# Patient Record
Sex: FEMALE | Race: WHITE | NOT HISPANIC OR LATINO | Employment: FULL TIME | URBAN - METROPOLITAN AREA
[De-identification: names, ages, dates, MRNs, and addresses within clinical notes are randomized per-mention and may not be internally consistent; named-entity substitution may affect disease eponyms.]

---

## 2017-01-03 ENCOUNTER — OFFICE VISIT (OUTPATIENT)
Dept: FAMILY MEDICINE | Facility: CLINIC | Age: 32
End: 2017-01-03
Payer: COMMERCIAL

## 2017-01-03 VITALS
DIASTOLIC BLOOD PRESSURE: 64 MMHG | HEART RATE: 91 BPM | OXYGEN SATURATION: 100 % | HEIGHT: 63 IN | BODY MASS INDEX: 23.92 KG/M2 | WEIGHT: 135 LBS | SYSTOLIC BLOOD PRESSURE: 102 MMHG | TEMPERATURE: 98.6 F

## 2017-01-03 DIAGNOSIS — R42 DIZZINESS: ICD-10-CM

## 2017-01-03 DIAGNOSIS — Z01.818 PREOP GENERAL PHYSICAL EXAM: Primary | ICD-10-CM

## 2017-01-03 DIAGNOSIS — H93.92: ICD-10-CM

## 2017-01-03 LAB
ALBUMIN SERPL-MCNC: 4 G/DL (ref 3.4–5)
ALP SERPL-CCNC: 116 U/L (ref 40–150)
ALT SERPL W P-5'-P-CCNC: 51 U/L (ref 0–50)
ANION GAP SERPL CALCULATED.3IONS-SCNC: 9 MMOL/L (ref 3–14)
AST SERPL W P-5'-P-CCNC: 27 U/L (ref 0–45)
BASOPHILS # BLD AUTO: 0 10E9/L (ref 0–0.2)
BASOPHILS NFR BLD AUTO: 0.4 %
BETA HCG QUAL IFA URINE: NEGATIVE
BILIRUB SERPL-MCNC: 0.2 MG/DL (ref 0.2–1.3)
BUN SERPL-MCNC: 15 MG/DL (ref 7–30)
CALCIUM SERPL-MCNC: 9.1 MG/DL (ref 8.5–10.1)
CHLORIDE SERPL-SCNC: 104 MMOL/L (ref 94–109)
CO2 SERPL-SCNC: 28 MMOL/L (ref 20–32)
CREAT SERPL-MCNC: 0.72 MG/DL (ref 0.52–1.04)
DIFFERENTIAL METHOD BLD: NORMAL
EOSINOPHIL # BLD AUTO: 0.2 10E9/L (ref 0–0.7)
EOSINOPHIL NFR BLD AUTO: 3.3 %
ERYTHROCYTE [DISTWIDTH] IN BLOOD BY AUTOMATED COUNT: 11.9 % (ref 10–15)
GFR SERPL CREATININE-BSD FRML MDRD: ABNORMAL ML/MIN/1.7M2
GLUCOSE SERPL-MCNC: 104 MG/DL (ref 70–99)
HCT VFR BLD AUTO: 41.2 % (ref 35–47)
HGB BLD-MCNC: 14 G/DL (ref 11.7–15.7)
LYMPHOCYTES # BLD AUTO: 2 10E9/L (ref 0.8–5.3)
LYMPHOCYTES NFR BLD AUTO: 28.5 %
MCH RBC QN AUTO: 29.6 PG (ref 26.5–33)
MCHC RBC AUTO-ENTMCNC: 34 G/DL (ref 31.5–36.5)
MCV RBC AUTO: 87 FL (ref 78–100)
MONOCYTES # BLD AUTO: 0.6 10E9/L (ref 0–1.3)
MONOCYTES NFR BLD AUTO: 8.1 %
NEUTROPHILS # BLD AUTO: 4.1 10E9/L (ref 1.6–8.3)
NEUTROPHILS NFR BLD AUTO: 59.7 %
PLATELET # BLD AUTO: 186 10E9/L (ref 150–450)
POTASSIUM SERPL-SCNC: 3.7 MMOL/L (ref 3.4–5.3)
PROT SERPL-MCNC: 7.5 G/DL (ref 6.8–8.8)
RBC # BLD AUTO: 4.73 10E12/L (ref 3.8–5.2)
SODIUM SERPL-SCNC: 141 MMOL/L (ref 133–144)
WBC # BLD AUTO: 6.9 10E9/L (ref 4–11)

## 2017-01-03 PROCEDURE — 99214 OFFICE O/P EST MOD 30 MIN: CPT | Performed by: PHYSICIAN ASSISTANT

## 2017-01-03 PROCEDURE — 85025 COMPLETE CBC W/AUTO DIFF WBC: CPT | Performed by: PHYSICIAN ASSISTANT

## 2017-01-03 PROCEDURE — 80053 COMPREHEN METABOLIC PANEL: CPT | Performed by: PHYSICIAN ASSISTANT

## 2017-01-03 PROCEDURE — 36415 COLL VENOUS BLD VENIPUNCTURE: CPT | Performed by: PHYSICIAN ASSISTANT

## 2017-01-03 PROCEDURE — 84703 CHORIONIC GONADOTROPIN ASSAY: CPT | Performed by: PHYSICIAN ASSISTANT

## 2017-01-03 NOTE — PROGRESS NOTES
[address] [address]  [phone]  Dept: [phone]    PRE-OP EVALUATION:  Today's date: 1/3/2017    Jenae Villeda (: 1985) presents for pre-operative evaluation assessment as requested by Dr. rSidhar Tran.  She requires evaluation and anesthesia risk assessment prior to undergoing surgery/procedure for treatment of Fluid/opacity in the mastoid.  Proposed procedure: Left Mastoidectomy Possible Pressure Equalization Tube Insertion -Left     Date of Surgery/ Procedure: 2017  Time of Surgery/ Procedure: 9:05am  Hospital/Surgical Facility: [facility]  Primary Physician: No primary care provider on file.  Type of Anesthesia Anticipated: General    Patient has a Health Care Directive or Living Will:  NO    1. NO - Do you have a history of heart attack, stroke, stent, bypass or surgery on an artery in the head, neck, heart or legs?  2. NO - Do you ever have any pain or discomfort in your chest?  3. NO - Do you have a history of  Heart Failure?  4. NO - Are you troubled by shortness of breath when: walking on the level, up a slight hill or at night?  5. YES - DO YOU CURRENTLY HAVE A COLD, BRONCHITIS OR OTHER RESPIRATORY INFECTION? She had a sinus cold a few weeks ago, but that has cleared.  She reports mild nasal congestion in the last day or two.    6. NO - Do you have a cough, shortness of breath or wheezing?  7. NO - Do you sometimes get pains in the calves of your legs when you walk?  8. NO - Do you or anyone in your family have previous history of blood clots?  9. NO - Do you or does anyone in your family have a serious bleeding problem such as prolonged bleeding following surgeries or cuts?  10. NO - Have you ever had problems with anemia or been told to take iron pills?  11. NO - Have you had any abnormal blood loss such as black, tarry or bloody stools, or abnormal vaginal bleeding?  12. NO - Have you  ever had a blood transfusion?  13. NO - Have you or any of your relatives ever had problems with anesthesia?  14. NO - Do you have sleep apnea, excessive snoring or daytime drowsiness?  15. NO - Do you have any prosthetic heart valves?  16. NO - Do you have prosthetic joints?  17. NO - Is there any chance that you may be pregnant?      HPI:                                                      Brief HPI related to upcoming procedure: Dizziness, nausea, vertigo and abnomral CT scan showing opacity/fluid in mastoid cells.        See problem list for active medical problems.  Problems all longstanding and stable, except as noted/documented.  See ROS for pertinent symptoms related to these conditions.                                                                                                  .    MEDICAL HISTORY:                                                      Patient Active Problem List    Diagnosis Date Noted     Status post  delivery 2016     Priority: Medium     Labor and delivery, indication for care 2016     Priority: Medium     Supervision of normal pregnancy in first trimester 2015     Priority: Medium     OB notes --SURPRISE;  -Fab; EDC c/b 9wk us; post placenta  Genetic screen --declined  Blood type O negative --received rhogam at 28wks (16)  Tdap/Flu --16; had thru work 2015  Prior c/s --FTP/CPD with 8-2 delivery; would like scheduled repeat at 39wks-scheduled for 16 @ 7AM  Hx PTL at 34wks with son; steriods and nifedipine; delivered at 40wks; will check CL at anatomy us  1 HR GCT/HGB: passed-83,11.7       Previous  delivery, antepartum 2015     Priority: Medium     S/P  section 2014     Priority: Medium     Dizziness 2013     Followed by Neurology, see scanned record 2013       Perspiration excessive 10/22/2012     Angular cheilitis 06/15/2011     Treated with protopic - by derm.       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  Think is related; but no immediate treatment needed.         CARDIOVASCULAR SCREENING; LDL GOAL LESS THAN 160 10/31/2010     IBS (irritable bowel syndrome) 2009     Mononucleosis      Totally recovered       Recurrent stomach ache 2008     Has had evaluated in the past.  Gluten AB negative. Will have records sent. No current treatment but not resolved either.        Past Medical History   Diagnosis Date     Mononucleosis      Totally recovered     IBS (irritable bowel syndrome)      Nasal congestion      Sore throat      Headache      GI problem      Heartburn      Diarrhea      Oligomenorrhea      Past Surgical History   Procedure Laterality Date      section  2014     Procedure:  SECTION;  Surgeon: Izabela Saxena MD;  Location:  L+D      section N/A 2016     Procedure:  SECTION;  Surgeon: Whitney Marshall MD;  Location:  L+D     Gyn surgery        in  and      Current Outpatient Prescriptions   Medication Sig Dispense Refill     Dicyclomine HCl (BENTYL PO) Take 10-20 mg by mouth 3 times daily as needed       OTC products: none    No Known Allergies   Latex Allergy: NO    Social History   Substance Use Topics     Smoking status: Never Smoker      Smokeless tobacco: Never Used     Alcohol Use: No     History   Drug Use No       REVIEW OF SYSTEMS:                                                    Constitutional, neuro, ENT, endocrine, pulmonary, cardiac, gastrointestinal, genitourinary, musculoskeletal, integument and psychiatric systems are negative, except as otherwise noted.    EXAM:                                                    /64 mmHg  Pulse 91  Temp(Src) 98.6  F (37  C) (Oral)  Ht 5' 3\" (1.6 m)  Wt 135 lb (61.236 kg)  BMI 23.92 kg/m2  " SpO2 100%  LMP 12/24/2016 (Exact Date)  Breastfeeding? No    GENERAL APPEARANCE: healthy, alert and no distress     EYES: EOMI,- PERRL     HENT: ear canals and TM's normal and nose and mouth without ulcers or lesions     NECK: no adenopathy, no asymmetry, masses, or scars and thyroid normal to palpation     RESP: lungs clear to auscultation - no rales, rhonchi or wheezes     CV: regular rates and rhythm, normal S1 S2, no S3 or S4 and no murmur, click or rub -     ABDOMEN:  soft, nontender, no HSM or masses and bowel sounds normal     MS: extremities normal- no gross deformities noted, no evidence of inflammation in joints, FROM in all extremities.     SKIN: no suspicious lesions or rashes     NEURO: Normal strength and tone, sensory exam grossly normal, mentation intact and speech normal     PSYCH: mentation appears normal. and affect normal/bright     LYMPHATICS: No axillary, cervical, inguinal, or supraclavicular nodes    DIAGNOSTICS:                                                      Labs Drawn and in Process:   Unresulted Labs Ordered in the Past 30 Days of this Admission     No orders found from 11/5/2016 to 1/4/2017.          Recent Labs   Lab Test  07/21/16   0800  07/20/16   0530   12/22/15   1134   06/11/13   1050  04/09/12   1000   HGB  12.1  12.8   < >  13.1   < >  14.1  14.3   PLT   --    --    --   153   --   166  158   NA   --    --    --    --    --   140  142   POTASSIUM   --    --    --    --    --   3.9  3.8   CR   --    --    --    --    --   0.69  0.74    < > = values in this interval not displayed.        IMPRESSION:                                                    Reason for surgery/procedure: surgery/procedure for treatment of Fluid/opacity in the mastoid.  Diagnosis/reason for consult: Pre-op for surgery to correct CT findings of:  opacity/fluid in mastoid cells    The proposed surgical procedure is considered INTERMEDIATE risk.    REVISED CARDIAC RISK INDEX  The patient has the  following serious cardiovascular risks for perioperative complications such as (MI, PE, VFib and 3  AV Block):  No serious cardiac risks  INTERPRETATION: 0 risks: Class I (very low risk - 0.4% complication rate)    The patient has the following additional risks for perioperative complications:  No identified additional risks      ICD-10-CM    1. Preop general physical exam Z01.818 CBC with platelets and differential     Comprehensive metabolic panel (BMP + Alb, Alk Phos, ALT, AST, Total. Bili, TP)     Beta HCG qual IFA urine   2. Disorder of ear, left H93.92    3. Dizziness R42        RECOMMENDATIONS:                                                      --Consult hospital rounder / IM to assist post-op medical management    --Patient is to take all scheduled medications on the day of surgery EXCEPT for modifications listed below.    APPROVAL GIVEN to proceed with proposed procedure, without further diagnostic evaluation    Patient advised to followup if she develops any sign of cold/URI or infection.  She was cautioned to not plan for surgery if she has illness.         Signed Electronically by: Ashwini Rios PA-C    Copy of this evaluation report is provided to requesting physician.I have reviewed today's vital signs, medications, labs and H &P. I agree with Ashwini Rios PA-C's assessment and plan as above.        Jia Ramos MD        [facility] Preop Guidelines

## 2017-01-03 NOTE — NURSING NOTE
"Chief Complaint   Patient presents with     Pre-Op Exam       Initial /64 mmHg  Pulse 91  Temp(Src) 98.6  F (37  C) (Oral)  Ht 5' 3\" (1.6 m)  Wt 135 lb (61.236 kg)  BMI 23.92 kg/m2  SpO2 100%  LMP 12/24/2016 (Exact Date)  Breastfeeding? No Estimated body mass index is 23.92 kg/(m^2) as calculated from the following:    Height as of this encounter: 5' 3\" (1.6 m).    Weight as of this encounter: 135 lb (61.236 kg).  BP completed using cuff size: regular  Csaba Mlnarik CMA    "

## 2017-01-04 NOTE — PROGRESS NOTES
Quick Note:    Jennifer Emanuel ,    The results from your recent lab work are within normal limits.    -Liver and gallbladder tests (ALT,AST, Alk phos,bilirubin) are fine, but one liver number is very mildly elevated. I advise a re-check of this lab in 8-12 weeks.  -Kidney function (GFR) is normal.  -Sodium is normal.  -Potassium is normal.  -Glucose (diabetic screening test) is mildly elevated, but you were not fasting, so this is fine.   -Normal red blood cell (hgb) levels, normal white blood cell count and normal platelet levels.     Thank you for choosing [facility] for your health care needs,      Ashwini Rios PA-C    ______

## 2017-01-16 ENCOUNTER — ANESTHESIA (OUTPATIENT)
Dept: SURGERY | Facility: AMBULATORY SURGERY CENTER | Age: 32
End: 2017-01-16

## 2017-01-16 RX ORDER — EPHEDRINE SULFATE 50 MG/ML
INJECTION, SOLUTION INTRAMUSCULAR; INTRAVENOUS; SUBCUTANEOUS PRN
Status: DISCONTINUED | OUTPATIENT
Start: 2017-01-16 | End: 2017-01-16

## 2017-01-16 RX ORDER — LIDOCAINE HYDROCHLORIDE 20 MG/ML
INJECTION, SOLUTION INFILTRATION; PERINEURAL PRN
Status: DISCONTINUED | OUTPATIENT
Start: 2017-01-16 | End: 2017-01-16

## 2017-01-16 RX ORDER — PROPOFOL 10 MG/ML
INJECTION, EMULSION INTRAVENOUS CONTINUOUS PRN
Status: DISCONTINUED | OUTPATIENT
Start: 2017-01-16 | End: 2017-01-16

## 2017-01-16 RX ORDER — PROPOFOL 10 MG/ML
INJECTION, EMULSION INTRAVENOUS PRN
Status: DISCONTINUED | OUTPATIENT
Start: 2017-01-16 | End: 2017-01-16

## 2017-01-16 RX ORDER — ONDANSETRON 2 MG/ML
INJECTION INTRAMUSCULAR; INTRAVENOUS PRN
Status: DISCONTINUED | OUTPATIENT
Start: 2017-01-16 | End: 2017-01-16

## 2017-01-16 RX ORDER — DEXAMETHASONE SODIUM PHOSPHATE 4 MG/ML
INJECTION, SOLUTION INTRA-ARTICULAR; INTRALESIONAL; INTRAMUSCULAR; INTRAVENOUS; SOFT TISSUE PRN
Status: DISCONTINUED | OUTPATIENT
Start: 2017-01-16 | End: 2017-01-16

## 2017-01-16 RX ADMIN — PROPOFOL 50 MG: 10 INJECTION, EMULSION INTRAVENOUS at 11:20

## 2017-01-16 RX ADMIN — EPHEDRINE SULFATE 10 MG: 50 INJECTION, SOLUTION INTRAMUSCULAR; INTRAVENOUS; SUBCUTANEOUS at 09:48

## 2017-01-16 RX ADMIN — EPHEDRINE SULFATE 10 MG: 50 INJECTION, SOLUTION INTRAMUSCULAR; INTRAVENOUS; SUBCUTANEOUS at 10:27

## 2017-01-16 RX ADMIN — PROPOFOL: 10 INJECTION, EMULSION INTRAVENOUS at 09:59

## 2017-01-16 RX ADMIN — SODIUM CHLORIDE, SODIUM LACTATE, POTASSIUM CHLORIDE, CALCIUM CHLORIDE: 600; 310; 30; 20 INJECTION, SOLUTION INTRAVENOUS at 11:30

## 2017-01-16 RX ADMIN — PROPOFOL 200 MG: 10 INJECTION, EMULSION INTRAVENOUS at 09:33

## 2017-01-16 RX ADMIN — LIDOCAINE HYDROCHLORIDE 60 MG: 20 INJECTION, SOLUTION INFILTRATION; PERINEURAL at 09:32

## 2017-01-16 RX ADMIN — PROPOFOL: 10 INJECTION, EMULSION INTRAVENOUS at 11:01

## 2017-01-16 RX ADMIN — PROPOFOL 200 MCG/KG/MIN: 10 INJECTION, EMULSION INTRAVENOUS at 09:35

## 2017-01-16 RX ADMIN — PROPOFOL 50 MG: 10 INJECTION, EMULSION INTRAVENOUS at 11:15

## 2017-01-16 RX ADMIN — DEXAMETHASONE SODIUM PHOSPHATE 4 MG: 4 INJECTION, SOLUTION INTRA-ARTICULAR; INTRALESIONAL; INTRAMUSCULAR; INTRAVENOUS; SOFT TISSUE at 09:37

## 2017-01-16 RX ADMIN — SODIUM CHLORIDE, SODIUM LACTATE, POTASSIUM CHLORIDE, CALCIUM CHLORIDE: 600; 310; 30; 20 INJECTION, SOLUTION INTRAVENOUS at 09:20

## 2017-01-16 RX ADMIN — ONDANSETRON 4 MG: 2 INJECTION INTRAMUSCULAR; INTRAVENOUS at 11:05

## 2017-01-17 ENCOUNTER — CARE COORDINATION (OUTPATIENT)
Dept: OTOLARYNGOLOGY | Facility: CLINIC | Age: 32
End: 2017-01-17

## 2017-01-17 NOTE — PROGRESS NOTES
POST OPERATIVE CALL: 1-17-17    PROCEDURE: left mastoidectomy 1-16-17/ same day procedure      PAIN: pain managed by percocet/ibuprofen    INCISION:still has turban dressing on- reviewed ear care and call for signs of  Incision infection.    BALANCE: light headed, denies dizziness    Facial function- smile equal    DIET: minimal-     BOWEL/BLADDER STATUS: started meds for bowel management, reviewed importance of this while on pain meds, pt understood    ACTIVITY: up to bathroom-     RTC:2-1-17 9:45 AM.  Will call pt this Friday with update on cultures and order ear drops - to start one week post op.

## 2017-01-23 ENCOUNTER — CARE COORDINATION (OUTPATIENT)
Dept: OTOLARYNGOLOGY | Facility: CLINIC | Age: 32
End: 2017-01-23

## 2017-01-23 NOTE — PROGRESS NOTES
Pt with  left mastoiditis, S/P 1-16-17 left mastoidectomy.  1-20-17 pt called with symptoms: left ear pain, pressure and unable to hear from left ear. Pt taking 1 tab percocet for the pain, pt is unable to hear out of the left ear. Pt says her hearing is distorted, unable to locate were sound is coming from, has a hard time focusing on task with this distorted hearing. Pt is to return to work 1-23-17. Pt should not return to work if taking pain medication.  The ear pressure is due to the gel foam packing in the ear, this will take time for the packing to dissolve.  Dr. Tran update and agreed with plan.  [employer] [phone]

## 2017-01-25 ENCOUNTER — CARE COORDINATION (OUTPATIENT)
Dept: OTOLARYNGOLOGY | Facility: CLINIC | Age: 32
End: 2017-01-25

## 2017-01-25 NOTE — PROGRESS NOTES
1-24-17 pt called she is feeling better, less pressure in the ear area and feels she could return to work  on 1-25-17. Dr. Tran updated and agrees with plan to return to work- no restrictions.

## 2017-02-01 ENCOUNTER — OFFICE VISIT (OUTPATIENT)
Dept: OTOLARYNGOLOGY | Facility: CLINIC | Age: 32
End: 2017-02-01

## 2017-02-01 VITALS — WEIGHT: 138 LBS | BODY MASS INDEX: 24.45 KG/M2 | HEIGHT: 63 IN

## 2017-02-01 DIAGNOSIS — H70.92 MASTOIDITIS, LEFT: Primary | ICD-10-CM

## 2017-02-01 ASSESSMENT — PAIN SCALES - GENERAL: PAINLEVEL: NO PAIN (0)

## 2017-02-01 NOTE — MR AVS SNAPSHOT
After Visit Summary   2/1/2017    Jenae Villeda    MRN: [id number]           Patient Information     Date Of Birth          1985        Visit Information        Provider Department      2/1/2017 9:45 AM Sridhar Tran MD [facility]        Care Instructions    You will need  to schedule a follow up appointment in 6-8-weeks with an audio.   Please call our clinic for any questions,concerns,or worsening symptoms.      Clinic #[phone]       Option 3  for the triage nurse.        Follow-ups after your visit        Your next 10 appointments already scheduled     Mar 15, 2017  9:00 AM   Walk In From ENT with JOLYNN Castellanos   [facility] ([facility])    [address] [place] [id number] [phone]            Mar 15, 2017  9:45 AM   (Arrive by 9:30 AM)   Return Visit with MD LESVIA Swift [facility] ([facility])    [address] [place] [id number] [phone]              Who to contact     Please call your clinic at [phone] to:    Ask questions about your health    Make or cancel appointments    Discuss your medicines    Learn about your test results    Speak to your doctor   If you have compliments or concerns about an experience at your clinic, or if you wish to file a complaint, please contact [facility] Patient Relations at [phone] or email us at [email or web address]         Additional Information About Your Visit        MyChart Information     [employer] gives you secure access to your electronic health record. If you see a primary care provider, you can also send messages to your care team and make appointments. If you have questions, please call your primary care clinic.  If you do not have a primary care provider, please call [phone] and they will assist you.      "[employer]hart  "is an electronic gateway that provides easy, online access to your medical records. With [email or web address], you can request a clinic appointment, read your test results, renew a prescription or communicate with your care team.     To access your existing account, please contact your [facility] or call [phone] for assistance.        Care EveryWhere ID     This is your Care EveryWhere ID. This could be used by other organizations to access your [facility] medical records  [id number]        Your Vitals Were     Height BMI (Body Mass Index) Last Period             1.59 m (5' 2.6\") 24.76 kg/m2 12/24/2016 (Exact Date)          Blood Pressure from Last 3 Encounters:   01/16/17 119/60   01/03/17 102/64   09/02/16 110/68    Weight from Last 3 Encounters:   02/01/17 62.596 kg (138 lb)   01/16/17 61.236 kg (135 lb)   01/03/17 61.236 kg (135 lb)              Today, you had the following     No orders found for display         Today's Medication Changes          These changes are accurate as of: 2/1/17 10:07 AM.  If you have any questions, ask your nurse or doctor.               Stop taking these medicines if you haven't already. Please contact your care team if you have questions.     ondansetron 4 MG ODT tab   Commonly known as:  ZOFRAN-ODT   Stopped by:  Sridhar Tran MD           oxyCODONE-acetaminophen 5-325 MG per tablet   Commonly known as:  PERCOCET   Stopped by:  Sridhar Tran MD           senna-docusate 8.6-50 MG per tablet   Commonly known as:  SENOKOT-S;PERICOLACE   Stopped by:  Sridhar Tran MD                    Primary Care Provider    None Specified       No primary provider on file.        Thank you!     Thank you for choosing [facility]  for your care. Our goal is always to provide you with excellent care. Hearing back from our patients is one way we can continue to improve our services. Please take a few minutes to complete the written survey that you may " receive in the mail after your visit with us. Thank you!             Your Updated Medication List - Protect others around you: Learn how to safely use, store and throw away your medicines at www.disposemymeds.org.          This list is accurate as of: 2/1/17 10:07 AM.  Always use your most recent med list.                   Brand Name Dispense Instructions for use    BENTYL PO      Take 10-20 mg by mouth 3 times daily as needed       IBUPROFEN PO      Take 600 mg by mouth every 8 hours as needed for moderate pain

## 2017-02-01 NOTE — PATIENT INSTRUCTIONS
You will need  to schedule a follow up appointment in 6-8-weeks with an audio.   Please call our clinic for any questions,concerns,or worsening symptoms.      Clinic #[phone]       Option 3  for the triage nurse.

## 2017-02-01 NOTE — NURSING NOTE
Chief Complaint   Patient presents with     RECHECK     post op. mastoidectomy     Cedric Cordon LPN

## 2017-02-01 NOTE — PROGRESS NOTES
HISTORY OF PRESENT ILLNESS:  Ms. Villeda is a 31-year-old woman who is seen in followup after having had a left wall up mastoidectomy.  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  At the time of surgery, she was seen to have granulation tissue and a motor oil consistency fluid filling the mastoid space.  She had antral block at the time of the procedure.  I did a facial recess and a resection of the antral block.  Her middle ear looks reasonably healthy.  The fluid was sent for culture and showed a propionibacterium which I think is just a contaminant from the field.  I did not treat it.  She has had improvement in her symptoms since the procedure.  We talked a little about that today.        REVIEW OF SYSTEMS:  Her review of systems shows a headache and ringing in the ear, but no other real changes.       MEDICATIONS:  Her medications and problem list were reviewed.      PHYSICAL EXAMINATION:  Examination today shows that the incision line is clean, dry and intact.  The external canal has a small clot lying on the drum seen with the operating microscope.  Using a right angle hook and the microscope, I removed it with an alligator and the drum is anatomically intact.  Hughes lateralizes left.  Rinne reverses on the left.  The exam is otherwise unremarkable.  Her facial nerve remains a House-Brackmann I and she shows no evidence of nystagmus.      IMPRESSION:  My overall impression is that she is doing well after her surgery.      PLAN:  I will recheck her audio in six to eight weeks and reexamine her.  The question is whether she needs a PE tube or not and for the moment because of water sports I am going to leaving it intact.      SL/brandy

## 2017-02-01 NOTE — Clinical Note
2/1/2017       RE: Jenae Villeda  [address] [address] [facility] [address]     Dear Colleague,    Thank you for referring your patient, Jenae Villeda, to the [facility] at [facility]. Please see a copy of my visit note below.    HISTORY OF PRESENT ILLNESS:  Ms. Villeda is a 31-year-old woman who is seen in followup after having had a left wall up mastoidectomy.  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  At the time of surgery, she was seen to have granulation tissue and a motor oil consistency fluid filling the mastoid space.  She had antral block at the time of the procedure.  I did a facial recess and a resection of the antral block.  Her middle ear looks reasonably healthy.  The fluid was sent for culture and showed a propionibacterium which I think is just a contaminant from the field.  I did not treat it.  She has had improvement in her symptoms since the procedure.  We talked a little about that today.        REVIEW OF SYSTEMS:  Her review of systems shows a headache and ringing in the ear, but no other real changes.       MEDICATIONS:  Her medications and problem list were reviewed.      PHYSICAL EXAMINATION:  Examination today shows that the incision line is clean, dry and intact.  The external canal has a small clot lying on the drum seen with the operating microscope.  Using a right angle hook and the microscope, I removed it with an alligator and the drum is anatomically intact.  Hughes lateralizes left.  Rinne reverses on the left.  The exam is otherwise unremarkable.  Her facial nerve remains a House-Brackmann I and she shows no evidence of nystagmus.      IMPRESSION:  My overall impression is that she is doing well after her surgery.      PLAN:  I will recheck her audio in six to eight weeks and reexamine her.  The question is whether she needs a PE tube or not and for the moment because of water  sports I am going to leaving it intact.      SL/brandy           Again, thank you for allowing me to participate in the care of your patient.      Sincerely,    Sridhar Tran MD

## 2017-03-07 DIAGNOSIS — H70.90 MASTOIDITIS: Primary | ICD-10-CM

## 2017-03-08 ENCOUNTER — OFFICE VISIT (OUTPATIENT)
Dept: DERMATOLOGY | Facility: CLINIC | Age: 32
End: 2017-03-08

## 2017-03-08 DIAGNOSIS — L71.0 DERMATITIS, PERIORAL: Primary | ICD-10-CM

## 2017-03-08 DIAGNOSIS — I78.1 SPIDER ANGIOMA: ICD-10-CM

## 2017-03-08 RX ORDER — MINOCYCLINE HYDROCHLORIDE 100 MG/1
100 CAPSULE ORAL 2 TIMES DAILY
Qty: 60 CAPSULE | Refills: 2 | Status: SHIPPED | OUTPATIENT
Start: 2017-03-08 | End: 2017-11-05

## 2017-03-08 ASSESSMENT — PAIN SCALES - GENERAL: PAINLEVEL: NO PAIN (0)

## 2017-03-08 NOTE — MR AVS SNAPSHOT
After Visit Summary   3/8/2017    Jenae Villeda    MRN: [id number]           Patient Information     Date Of Birth          1985        Visit Information        Provider Department      3/8/2017 11:15 AM Hiwot Carr PA-C [facility] Dermatology        Today's Diagnoses     Dermatitis, perioral    -  1    Spider angioma           Follow-ups after your visit        Follow-up notes from your care team     Return if symptoms worsen or fail to improve.      Your next 10 appointments already scheduled     Mar 15, 2017  9:00 AM CDT   Walk In From ENT with Indra Castellanos   [facility] Audiology ([facility])    [address] [place] [address]  [address] [facility] [address] [phone]            Mar 15, 2017  9:45 AM CDT   (Arrive by 9:30 AM)   Return Visit with Sridhar Tran MD   [facility] Ear Nose and Throat ([facility])    [address] [place] [address] [phone]            May 31, 2017 10:45 AM CDT   (Arrive by 10:30 AM)   Laser Visit with Senait Combs MD   [facility] Dermatology ([facility])    [address] [place] [address] [phone]              Who to contact     Please call your clinic at [phone] to:    Ask questions about your health    Make or cancel appointments    Discuss your medicines    Learn about your test results    Speak to your doctor   If you have compliments or concerns about an experience at your clinic, or if you wish to file a complaint, please contact [facility] Patient Relations at [phone] or email us at [email or web address]         Additional Information About Your Visit        MyChart Information     [employer] gives you secure access to your electronic health record. If you see a primary care provider, you can also send messages to your care team and make appointments. If  you have questions, please call your primary care clinic.  If you do not have a primary care provider, please call [phone] and they will assist you.      [employer] is an electronic gateway that provides easy, online access to your medical records. With [employer], you can request a clinic appointment, read your test results, renew a prescription or communicate with your care team.     To access your existing account, please contact your [facility] or call [phone] for assistance.        Care EveryWhere ID     This is your Care EveryWhere ID. This could be used by other organizations to access your [facility] medical records  [id number]         Blood Pressure from Last 3 Encounters:   01/16/17 119/60   01/03/17 102/64   09/02/16 110/68    Weight from Last 3 Encounters:   02/01/17 62.6 kg (138 lb)   01/16/17 61.2 kg (135 lb)   01/03/17 61.2 kg (135 lb)              Today, you had the following     No orders found for display         Today's Medication Changes          These changes are accurate as of: 3/8/17 11:59 PM.  If you have any questions, ask your nurse or doctor.               Start taking these medicines.        Dose/Directions    minocycline 100 MG capsule   Commonly known as:  MINOCIN/DYNACIN   Used for:  Dermatitis, perioral        Dose:  100 mg   Take 1 capsule (100 mg) by mouth 2 times daily   Quantity:  60 capsule   Refills:  2            Where to get your medicines      These medications were sent to [facility] [address] [address]    Hours:  TRANSPLANT PHONE NUMBER [phone] Phone:  [phone]     minocycline 100 MG capsule                Primary Care Provider    None Specified       No primary provider on file.        Thank you!     Thank you for choosing [facility] DERMATOLOGY  for your care. Our goal is always to provide you with excellent care. Hearing back  from our patients is one way we can continue to improve our services. Please take a few minutes to complete the written survey that you may receive in the mail after your visit with us. Thank you!             Your Updated Medication List - Protect others around you: Learn how to safely use, store and throw away your medicines at www.disposemymeds.org.          This list is accurate as of: 3/8/17 11:59 PM.  Always use your most recent med list.                   Brand Name Dispense Instructions for use    BENTYL PO      Take 10-20 mg by mouth 3 times daily as needed       IBUPROFEN PO      Take 600 mg by mouth every 8 hours as needed for moderate pain       minocycline 100 MG capsule    MINOCIN/DYNACIN    60 capsule    Take 1 capsule (100 mg) by mouth 2 times daily

## 2017-03-08 NOTE — NURSING NOTE
Dermatology Rooming Note    Jenae Lynn Ronal's goals for this visit include:   Chief Complaint   Patient presents with     Derm Problem     Jenae comes to clinic with areas of concern on her forearm and face.     Luciana Reed LPN

## 2017-03-08 NOTE — PROGRESS NOTES
[facility] Dermatology Note      Dermatology Problem List:  1.Perioral dermatitis- minocycline 100 mg bid  Spider angioma left cheek- consult with Dr Combs for laser tx  Traumatic prurpura    CC:   Chief Complaint   Patient presents with     Derm Problem     Jenae comes to clinic with areas of concern on her forearm and face.         Encounter Date: Mar 8, 2017    History of Present Illness:  Ms. Jenae Villeda is a 31 year old female who presents in self referral for rashes below her nose, her right eyelid and chin. She states it started in January. It is itchy and flaky. She noticed it after she had a mastoidectomy. She tried to apply moisturizer without improvement. She uses a MAC cosmetic all purpose moisturizer. She tried switching her moisturizer after this started and did not help.     Additionally, she has a bump on her left cheek. She got it during pregnancy two years ago and has not changed. She would like this treated for cosmetic purposes. It does not itch, bleed or hurt.     She also noticed some bruising on her left forearm and wonders what happened. She is feeling well otherwise, without other skin concerns.         Past Medical History:   Patient Active Problem List   Diagnosis     Recurrent stomach ache     Mononucleosis     IBS (irritable bowel syndrome)     CARDIOVASCULAR SCREENING; LDL GOAL LESS THAN 160     Angular cheilitis     Disorder of ear, left     Perspiration excessive     Dizziness     S/P  section     Supervision of normal pregnancy in first trimester     Previous  delivery, antepartum     Labor and delivery, indication for care     Status post  delivery     Past Medical History   Diagnosis Date     Diarrhea      GI problem      Headache      Heartburn      IBS (irritable bowel syndrome) 2004     Mononucleosis      Totally recovered     Nasal congestion      Oligomenorrhea      Sore throat      Past Surgical History   Procedure  Laterality Date      section  2014     Procedure:  SECTION;  Surgeon: Izabela Saxena MD;  Location:  L+D      section N/A 2016     Procedure:  SECTION;  Surgeon: Whitney Marshall MD;  Location:  L+D     Gyn surgery        in  and      Mastoidectomy Left 2017     Procedure: MASTOIDECTOMY;  Surgeon: Sridhar Tran MD;  Location:  OR       Social History:  The patient works as an oncology nurse. The patient admits to use of tanning beds as a teenager for special events, last used .    Family History:  There is no family history of skin cancer.    Medications:  Current Outpatient Prescriptions   Medication Sig Dispense Refill     IBUPROFEN PO Take 600 mg by mouth every 8 hours as needed for moderate pain       Dicyclomine HCl (BENTYL PO) Take 10-20 mg by mouth 3 times daily as needed       No Known Allergies      Review of Systems:  -Skin/Heme New Pt: The patient admits to frequent sun exposure during her youth. The patient denies excessive scarring or problems healing except as per HPI. The patient denies excessive bleeding.  -Constitutional: The patient denies fatigue, fevers, chills, unintended weight loss, and night sweats.  -HEENT: Patient denies nonhealing oral sores.  -Skin: As above in HPI. No additional skin concerns.    Physical exam:  Vitals: There were no vitals taken for this visit.  GEN: This is a well developed, well-nourished female in no acute distress, in a pleasant mood.    SKIN: Sun-exposed skin, which includes the head/face, neck, both arms, digits, and/or nails was examined.   There are pink scaly papules periorally and right upper eyelid.   There is a blanching 2 mm pink papule on the left cheek with spider like appearance on dermoscopy.   There is a light brown yellow green patch on the left mid volar forearm and adjacent patch of few petechiae.   -No other lesions of concern on areas examined.      Impression/Plan:  1. Perioral dermatitis-     .Discussed possible etiologies and treatment options.    Would recommend simplifying topicals to face, such as only a gentle cleanser and moisturizer.     She would like to start minocycline. Discussed its anti-inflammatory effect.    Start minocycline 100 mg po twice daily #60. Discussed common side effects such a photosensitivity, dyspepsia and dizziness. Discussed rare side effects such as pseudotumor cerebri and minocycline hyperpigmentation.     She will notify the office of improvement, continue up to 3 mo if slow to improve.     Return if worsening.     2. Spider angioma- left cheek.     Examined with Dr JUAN MANUEL Combs and Dr Hameed.    Discussed laser treatment. She will proceed with this cosmetic procedure with Dr Combs.     3. Traumatic purpura, likely due to carrying grocery bags last week.     Reassurance given. Return if developing multiple new spots.      Staff Involved:  Staff Only    All risks, benefits and alternatives were discussed with patient.  Patient is in agreement and understands the assessment and plan.  All questions were answered.  Sun Screen Education was given.   Return to Clinic in 3 months, if needed   Hiwot Carr PA-C

## 2017-03-08 NOTE — LETTER
3/8/2017       RE: Jenae Villeda  [address] [facility] [address]     Dear Colleague,    Thank you for referring your patient, Jenae Villeda, to the [facility] DERMATOLOGY at [facility]. Please see a copy of my visit note below.    [facility] Dermatology Note      Dermatology Problem List:  1.Perioral dermatitis- minocycline 100 mg bid  Spider angioma left cheek- consult with Dr Combs for laser tx  Traumatic prurpura    CC:   Chief Complaint   Patient presents with     Derm Problem     Jenae comes to clinic with areas of concern on her forearm and face.         Encounter Date: Mar 8, 2017    History of Present Illness:  Ms. Jenae Villeda is a 31 year old female who presents in self referral for rashes below her nose, her right eyelid and chin. She states it started in January. It is itchy and flaky. She noticed it after she had a mastoidectomy. She tried to apply moisturizer without improvement. She uses a MAC cosmetic all purpose moisturizer. She tried switching her moisturizer after this started and did not help.     Additionally, she has a bump on her left cheek. She got it during pregnancy two years ago and has not changed. She would like this treated for cosmetic purposes. It does not itch, bleed or hurt.     She also noticed some bruising on her left forearm and wonders what happened. She is feeling well otherwise, without other skin concerns.         Past Medical History:   Patient Active Problem List   Diagnosis     Recurrent stomach ache     Mononucleosis     IBS (irritable bowel syndrome)     CARDIOVASCULAR SCREENING; LDL GOAL LESS THAN 160     Angular cheilitis     Disorder of ear, left     Perspiration excessive     Dizziness     S/P  section     Supervision of normal pregnancy in first trimester     Previous  delivery, antepartum     Labor and delivery, indication for care     Status post   delivery     Past Medical History   Diagnosis Date     Diarrhea      GI problem      Headache      Heartburn      IBS (irritable bowel syndrome) 2004     Mononucleosis      Totally recovered     Nasal congestion      Oligomenorrhea      Sore throat      Past Surgical History   Procedure Laterality Date      section  2014     Procedure:  SECTION;  Surgeon: Izabela Saxena MD;  Location:  L+D      section N/A 2016     Procedure:  SECTION;  Surgeon: Whitney Marshall MD;  Location:  L+D     Gyn surgery        in  and      Mastoidectomy Left 2017     Procedure: MASTOIDECTOMY;  Surgeon: Sridhar Tran MD;  Location:  OR       Social History:  The patient works as an oncology nurse. The patient admits to use of tanning beds as a teenager for special events, last used .    Family History:  There is no family history of skin cancer.    Medications:  Current Outpatient Prescriptions   Medication Sig Dispense Refill     IBUPROFEN PO Take 600 mg by mouth every 8 hours as needed for moderate pain       Dicyclomine HCl (BENTYL PO) Take 10-20 mg by mouth 3 times daily as needed       No Known Allergies      Review of Systems:  -Skin/Heme New Pt: The patient admits to frequent sun exposure during her youth. The patient denies excessive scarring or problems healing except as per HPI. The patient denies excessive bleeding.  -Constitutional: The patient denies fatigue, fevers, chills, unintended weight loss, and night sweats.  -HEENT: Patient denies nonhealing oral sores.  -Skin: As above in HPI. No additional skin concerns.    Physical exam:  Vitals: There were no vitals taken for this visit.  GEN: This is a well developed, well-nourished female in no acute distress, in a pleasant mood.    SKIN: Sun-exposed skin, which includes the head/face, neck, both arms, digits, and/or nails was examined.   There are pink scaly papules periorally and right upper  eyelid.   There is a blanching 2 mm pink papule on the left cheek with spider like appearance on dermoscopy.   There is a light brown yellow green patch on the left mid volar forearm and adjacent patch of few petechiae.   -No other lesions of concern on areas examined.     Impression/Plan:  1. Perioral dermatitis-     .Discussed possible etiologies and treatment options.    Would recommend simplifying topicals to face, such as only a gentle cleanser and moisturizer.     She would like to start minocycline. Discussed its anti-inflammatory effect.    Start minocycline 100 mg po twice daily #60. Discussed common side effects such a photosensitivity, dyspepsia and dizziness. Discussed rare side effects such as pseudotumor cerebri and minocycline hyperpigmentation.     She will notify the office of improvement, continue up to 3 mo if slow to improve.     Return if worsening.     2. Spider angioma- left cheek.     Examined with Dr JUAN MANUEL Combs and Dr Hameed.    Discussed laser treatment. She will proceed with this cosmetic procedure with Dr Combs.     3. Traumatic purpura, likely due to carrying grocery bags last week.     Reassurance given. Return if developing multiple new spots.      Staff Involved:  Staff Only    All risks, benefits and alternatives were discussed with patient.  Patient is in agreement and understands the assessment and plan.  All questions were answered.  Sun Screen Education was given.   Return to Clinic in 3 months, if needed   Hiwot Carr PA-C

## 2017-03-15 ENCOUNTER — OFFICE VISIT (OUTPATIENT)
Dept: AUDIOLOGY | Facility: CLINIC | Age: 32
End: 2017-03-15

## 2017-03-15 ENCOUNTER — OFFICE VISIT (OUTPATIENT)
Dept: OTOLARYNGOLOGY | Facility: CLINIC | Age: 32
End: 2017-03-15

## 2017-03-15 VITALS — WEIGHT: 134 LBS | BODY MASS INDEX: 23.74 KG/M2 | HEIGHT: 63 IN

## 2017-03-15 DIAGNOSIS — H70.92 MASTOIDITIS OF LEFT SIDE: Primary | ICD-10-CM

## 2017-03-15 DIAGNOSIS — H70.92 MASTOIDITIS, LEFT: Primary | ICD-10-CM

## 2017-03-15 DIAGNOSIS — H92.02 OTALGIA OF LEFT EAR: ICD-10-CM

## 2017-03-15 ASSESSMENT — PAIN SCALES - GENERAL: PAINLEVEL: NO PAIN (0)

## 2017-03-15 NOTE — PROGRESS NOTES
HISTORY OF PRESENT ILLNESS:  Jenae is a 31-year-old woman who had a unilateral mastoid effusion.  She underwent surgery on January 16th and had the mastoidectomy performed.  Cultures really were not revealing.  Since the procedure, she had some dizziness which is almost completely resolved.  She said that there may be a second or two of dizziness which she last experienced two weeks ago.  Since that time, her dysgeusia has improved and has cleared completely.  The patient notes that she still has popping in the left ear and that she hears her own body sounds loudly in the left ear.  She is otherwise healthy and feels that she is doing well.  She does not have significant pain.  There is no otorrhea or drainage.      PHYSICAL EXAMINATION:  Examination today shows that under the microscope, both membranes are anatomically normal and look healthy.  No cerumen was identified.  The skin incision in the left postauricular area is clean, dry and intact.  Her facial nerve is a House-Brackmann class I bilaterally.  Hughes lateralizes to the left.  Rinne equivocally reverses on the left.      AUDIOGRAM:  An audiogram was obtained.  Hearing thresholds are better than 15 dB bilaterally and appear to be relatively normal.  Bone conductions are super normal threshold and tympanograms are normal.      IMPRESSION:  Normal resolution following surgery.      PLAN:  We will see her again at six months postop to recheck her audiogram.      JANET/brandy

## 2017-03-15 NOTE — LETTER
3/15/2017       RE: Jenae Villeda  [address] [facility] [address]     Dear Colleague,    Thank you for referring your patient, Jenae Villeda, to the [facility] EAR NOSE AND THROAT at [facility]. Please see a copy of my visit note below.    HISTORY OF PRESENT ILLNESS:  Jenae is a 31-year-old woman who had a unilateral mastoid effusion.  She underwent surgery on January 16th and had the mastoidectomy performed.  Cultures really were not revealing.  Since the procedure, she had some dizziness which is almost completely resolved.  She said that there may be a second or two of dizziness which she last experienced two weeks ago.  Since that time, her dysgeusia has improved and has cleared completely.  The patient notes that she still has popping in the left ear and that she hears her own body sounds loudly in the left ear.  She is otherwise healthy and feels that she is doing well.  She does not have significant pain.  There is no otorrhea or drainage.      PHYSICAL EXAMINATION:  Examination today shows that under the microscope, both membranes are anatomically normal and look healthy.  No cerumen was identified.  The skin incision in the left postauricular area is clean, dry and intact.  Her facial nerve is a House-Brackmann class I bilaterally.  Hughes lateralizes to the left.  Rinne equivocally reverses on the left.      AUDIOGRAM:  An audiogram was obtained.  Hearing thresholds are better than 15 dB bilaterally and appear to be relatively normal.  Bone conductions are super normal threshold and tympanograms are normal.      IMPRESSION:  Normal resolution following surgery.      PLAN:  We will see her again at six months postop to recheck her audiogram.      SL/brandy         Again, thank you for allowing me to participate in the care of your patient.      Sincerely,    Sridhar Tran MD

## 2017-03-15 NOTE — PATIENT INSTRUCTIONS
You will need  to schedule a follow up appointment in 3 months for an ear check.   Please call our clinic for any questions,concerns,or worsening symptoms.      Clinic #[phone]       Option 3  for the triage nurse.

## 2017-03-15 NOTE — MR AVS SNAPSHOT
After Visit Summary   3/15/2017    Jenae Villeda    MRN: [id number]           Patient Information     Date Of Birth          1985        Visit Information        Provider Department      3/15/2017 9:00 AM Shama Wilks AuD [facility] Audiology        Today's Diagnoses     Mastoiditis of left side    -  1       Follow-ups after your visit        Your next 10 appointments already scheduled     May 31, 2017 10:45 AM CDT   (Arrive by 10:30 AM)   Laser Visit with MD LESVIA Felipe [facility] Dermatology ([facility])    [address] [place] [address] [phone]              Who to contact     Please call your clinic at [phone] to:    Ask questions about your health    Make or cancel appointments    Discuss your medicines    Learn about your test results    Speak to your doctor   If you have compliments or concerns about an experience at your clinic, or if you wish to file a complaint, please contact [facility] Patient Relations at [phone] or email us at [email or web address]         Additional Information About Your Visit        MyChart Information     [employer] gives you secure access to your electronic health record. If you see a primary care provider, you can also send messages to your care team and make appointments. If you have questions, please call your primary care clinic.  If you do not have a primary care provider, please call [phone] and they will assist you.      [employer] is an electronic gateway that provides easy, online access to your medical records. With [employer], you can request a clinic appointment, read your test results, renew a prescription or communicate with your care team.     To access your existing account, please contact your [facility] Clinic or call [phone] for assistance.        Care EveryWhere ID     This is your Care EveryWhere ID.  This could be used by other organizations to access your [facility] medical records  [id number]         Blood Pressure from Last 3 Encounters:   01/16/17 119/60   01/03/17 102/64   09/02/16 110/68    Weight from Last 3 Encounters:   03/15/17 60.8 kg (134 lb)   02/01/17 62.6 kg (138 lb)   01/16/17 61.2 kg (135 lb)              We Performed the Following     AUDIOGRAM/TYMPANOGRAM - INTERFACE     [facility] Audiometry Thrshld Eval & Speech Recog (80481)     Tymps / Reflex   (75066)        Primary Care Provider    None Specified       No primary provider on file.        Thank you!     Thank you for choosing [facility] AUDIOLOGY  for your care. Our goal is always to provide you with excellent care. Hearing back from our patients is one way we can continue to improve our services. Please take a few minutes to complete the written survey that you may receive in the mail after your visit with us. Thank you!             Your Updated Medication List - Protect others around you: Learn how to safely use, store and throw away your medicines at www.disposemymeds.org.          This list is accurate as of: 3/15/17  9:59 AM.  Always use your most recent med list.                   Brand Name Dispense Instructions for use    BENTYL PO      Take 10-20 mg by mouth 3 times daily as needed       IBUPROFEN PO      Take 600 mg by mouth every 8 hours as needed for moderate pain       minocycline 100 MG capsule    MINOCIN/DYNACIN    60 capsule    Take 1 capsule (100 mg) by mouth 2 times daily

## 2017-03-15 NOTE — PROGRESS NOTES
AUDIOLOGY REPORT    SUMMARY: Audiology visit completed. See audiogram for results.      RECOMMENDATIONS: Follow-up with ENT.    Cuba Faria  Licensed Audiologist  [place] License #[id number]

## 2017-03-15 NOTE — MR AVS SNAPSHOT
After Visit Summary   3/15/2017    Jenae Villeda    MRN: [id number]           Patient Information     Date Of Birth          1985        Visit Information        Provider Department      3/15/2017 9:45 AM Sridhar Tran MD [facility]        Today's Diagnoses     Mastoiditis, left    -  1    Otalgia of left ear          Care Instructions    You will need  to schedule a follow up appointment in 3 months for an ear check.   Please call our clinic for any questions,concerns,or worsening symptoms.      Clinic #[phone]       Option 3  for the triage nurse.        Follow-ups after your visit        Your next 10 appointments already scheduled     May 31, 2017 10:45 AM CDT   (Arrive by 10:30 AM)   Laser Visit with MD LESVIA [facility] ([facility] [place])    [address] [place] [id number] [phone]              Who to contact     Please call your clinic at [phone] to:    Ask questions about your health    Make or cancel appointments    Discuss your medicines    Learn about your test results    Speak to your doctor   If you have compliments or concerns about an experience at your clinic, or if you wish to file a complaint, please contact [facility] Patient Relations at [phone] or email us at [email or web address]         Additional Information About Your Visit        MyChart Information     The Switcht gives you secure access to your electronic health record. If you see a primary care provider, you can also send messages to your care team and make appointments. If you have questions, please call your primary care clinic.  If you do not have a primary care provider, please call [phone] and they will assist you.      Yogiyo is an electronic gateway that provides easy, online access to your medical records. With Yogiyo, you can request a clinic appointment, read  "your test results, renew a prescription or communicate with your care team.     To access your existing account, please contact your [facility] or call [phone] for assistance.        Care EveryWhere ID     This is your Care EveryWhere ID. This could be used by other organizations to access your [place] medical records  [id number]        Your Vitals Were     Height BMI (Body Mass Index)                1.588 m (5' 2.5\") 24.12 kg/m2           Blood Pressure from Last 3 Encounters:   01/16/17 119/60   01/03/17 102/64   09/02/16 110/68    Weight from Last 3 Encounters:   03/15/17 60.8 kg (134 lb)   02/01/17 62.6 kg (138 lb)   01/16/17 61.2 kg (135 lb)              Today, you had the following     No orders found for display       Primary Care Provider    None Specified       No primary provider on file.        Thank you!     Thank you for choosing [facility]  for your care. Our goal is always to provide you with excellent care. Hearing back from our patients is one way we can continue to improve our services. Please take a few minutes to complete the written survey that you may receive in the mail after your visit with us. Thank you!             Your Updated Medication List - Protect others around you: Learn how to safely use, store and throw away your medicines at www.disposemymeds.org.          This list is accurate as of: 3/15/17 11:59 PM.  Always use your most recent med list.                   Brand Name Dispense Instructions for use    BENTYL PO      Take 10-20 mg by mouth 3 times daily as needed       IBUPROFEN PO      Take 600 mg by mouth every 8 hours as needed for moderate pain       minocycline 100 MG capsule    MINOCIN/DYNACIN    60 capsule    Take 1 capsule (100 mg) by mouth 2 times daily         "

## 2017-03-28 ENCOUNTER — TRANSFERRED RECORDS (OUTPATIENT)
Dept: HEALTH INFORMATION MANAGEMENT | Facility: CLINIC | Age: 32
End: 2017-03-28

## 2017-09-02 ENCOUNTER — HEALTH MAINTENANCE LETTER (OUTPATIENT)
Age: 32
End: 2017-09-02

## 2017-10-23 ENCOUNTER — MYC MEDICAL ADVICE (OUTPATIENT)
Dept: OBGYN | Facility: CLINIC | Age: 32
End: 2017-10-23

## 2017-11-05 ENCOUNTER — OFFICE VISIT (OUTPATIENT)
Dept: URGENT CARE | Facility: URGENT CARE | Age: 32
End: 2017-11-05
Payer: COMMERCIAL

## 2017-11-05 VITALS
SYSTOLIC BLOOD PRESSURE: 110 MMHG | BODY MASS INDEX: 23.22 KG/M2 | WEIGHT: 129 LBS | HEART RATE: 72 BPM | OXYGEN SATURATION: 98 % | TEMPERATURE: 98.2 F | RESPIRATION RATE: 14 BRPM | DIASTOLIC BLOOD PRESSURE: 70 MMHG

## 2017-11-05 DIAGNOSIS — M54.50 ACUTE LEFT-SIDED LOW BACK PAIN WITHOUT SCIATICA: Primary | ICD-10-CM

## 2017-11-05 PROCEDURE — 99213 OFFICE O/P EST LOW 20 MIN: CPT | Performed by: FAMILY MEDICINE

## 2017-11-05 RX ORDER — HYDROCODONE BITARTRATE AND ACETAMINOPHEN 5; 325 MG/1; MG/1
1 TABLET ORAL EVERY 6 HOURS PRN
Qty: 10 TABLET | Refills: 0 | Status: SHIPPED | OUTPATIENT
Start: 2017-11-05 | End: 2017-11-27

## 2017-11-05 NOTE — MR AVS SNAPSHOT
After Visit Summary   11/5/2017    Jenae Villeda    MRN: [id number]           Patient Information     Date Of Birth          1985        Visit Information        Provider Department      11/5/2017 4:10 PM Ranjan Brown MD [facility]        Today's Diagnoses     Acute left-sided low back pain without sciatica    -  1       Follow-ups after your visit        Who to contact     If you have questions or need follow up information about today's clinic visit or your schedule please contact [facility] directly at [phone].  Normal or non-critical lab and imaging results will be communicated to you by DropMathart, letter or phone within 4 business days after the clinic has received the results. If you do not hear from us within 7 days, please contact the clinic through DropMathart or phone. If you have a critical or abnormal lab result, we will notify you by phone as soon as possible.  Submit refill requests through Apse or call your pharmacy and they will forward the refill request to us. Please allow 3 business days for your refill to be completed.          Additional Information About Your Visit        MyChart Information     Apse gives you secure access to your electronic health record. If you see a primary care provider, you can also send messages to your care team and make appointments. If you have questions, please call your primary care clinic.  If you do not have a primary care provider, please call [phone] and they will assist you.        Care EveryWhere ID     This is your Care EveryWhere ID. This could be used by other organizations to access your [place] medical records  [id number]        Your Vitals Were     Pulse Temperature Respirations Last Period Pulse Oximetry BMI (Body Mass Index)    72 98.2  F (36.8  C) (Oral) 14 10/22/2017 98% 23.22 kg/m2       Blood Pressure from Last 3 Encounters:   11/05/17 110/70   01/16/17 119/60    01/03/17 102/64    Weight from Last 3 Encounters:   11/05/17 129 lb (58.5 kg)   03/15/17 134 lb (60.8 kg)   02/01/17 138 lb (62.6 kg)              Today, you had the following     No orders found for display         Today's Medication Changes          These changes are accurate as of: 11/5/17 11:59 PM.  If you have any questions, ask your nurse or doctor.               Start taking these medicines.        Dose/Directions    HYDROcodone-acetaminophen 5-325 MG per tablet   Commonly known as:  NORCO   Used for:  Acute left-sided low back pain without sciatica   Started by:  Ranjan Brown MD        Dose:  1 tablet   Take 1 tablet by mouth every 6 hours as needed   Quantity:  10 tablet   Refills:  0            Where to get your medicines      Some of these will need a paper prescription and others can be bought over the counter.  Ask your nurse if you have questions.     Bring a paper prescription for each of these medications     HYDROcodone-acetaminophen 5-325 MG per tablet                Primary Care Provider Fax #    Provider Not In System [phone]                Equal Access to Services     KEYSHAWN KHOURY : Hadroberto estradao Sonaomy, waaxda luqadaha, qaybta kaalmada adeegyatamiko, jani santiago . So [facility] [phone].    ATENCIÓN: Si habla español, tiene a leroy disposición servicios gratuitos de asistencia lingüística. Llame al [phone].    We comply with applicable federal civil rights laws and [place] laws. We do not discriminate on the basis of race, color, national origin, age, disability, sex, sexual orientation, or gender identity.            Thank you!     Thank you for choosing [facility]  for your care. Our goal is always to provide you with excellent care. Hearing back from our patients is one way we can continue to improve our services. Please take a few minutes to complete the written survey that you may receive in the mail after your visit with  us. Thank you!             Your Updated Medication List - Protect others around you: Learn how to safely use, store and throw away your medicines at www.disposemymeds.org.          This list is accurate as of: 11/5/17 11:59 PM.  Always use your most recent med list.                   Brand Name Dispense Instructions for use Diagnosis    BENTYL PO      Take 10-20 mg by mouth 3 times daily as needed        HYDROcodone-acetaminophen 5-325 MG per tablet    NORCO    10 tablet    Take 1 tablet by mouth every 6 hours as needed    Acute left-sided low back pain without sciatica       IBUPROFEN PO      Take 600 mg by mouth every 8 hours as needed for moderate pain

## 2017-11-05 NOTE — NURSING NOTE
"Jenae Villeda is a 32 year old female.      Chief Complaint   Patient presents with     Urgent Care     Back Pain     pt is here for back pain - mid to lower left and down into her hip - she fell down a couple steps in her home today       Initial /70 (BP Location: Right arm, Cuff Size: Adult Regular)  Pulse 72  Temp 98.2  F (36.8  C) (Oral)  Resp 14  Wt 129 lb (58.5 kg)  LMP 10/22/2017  SpO2 98%  BMI 23.22 kg/m2 Estimated body mass index is 23.22 kg/(m^2) as calculated from the following:    Height as of 3/15/17: 5' 2.5\" (1.588 m).    Weight as of this encounter: 129 lb (58.5 kg).  Medication Reconciliation: complete      Questioned patient about current smoking habits.  Pt. has never smoked.      Christina Hussein CMA      "

## 2017-11-10 NOTE — PROGRESS NOTES
SUBJECTIVE:  Jenae Villeda, a 32 year old female scheduled an appointment to discuss the following issues:    Acute left-sided low back pain without sciatica she is complaining of left-sided back pain after a fall today at home. Coming down stairs next refill backwards, was unable to catch herself because she was holding her child.    Medical, social, surgical, and family histories reviewed.    ROS:  CONSTITUTIONAL: As above  E: NEGATIVE for vision changes   R: NEGATIVE for significant cough or SOB  CV: NEGATIVE for chest pain, palpitations   GI: NEGATIVE for nausea, abdominal pain, heartburn, or change in bowel habits  : NEGATIVE for frequency, dysuria, or hematuria  MUSCULOSKELETAL: As above  N: NEGATIVE for weakness, dizziness or paresthesias or headache    OBJECTIVE:  /70 (BP Location: Right arm, Cuff Size: Adult Regular)  Pulse 72  Temp 98.2  F (36.8  C) (Oral)  Resp 14  Wt 129 lb (58.5 kg)  LMP 10/22/2017  SpO2 98%  BMI 23.22 kg/m2  EXAM:  GENERAL APPEARANCE: alert and mild distress  EYES: EOMI,  PERRL  HENT: ear canals and TM's normal and nose and mouth without ulcers or lesions  RESP: lungs clear to auscultation - no rales, rhonchi or wheezes  CV: regular rates and rhythm, normal S1 S2, no S3 or S4 and no murmur, click or rub -  ABDOMEN:  soft, nontender, no HSM or masses and bowel sounds normal  MS: Musculoskeletal back pain. Left-sided bottom of the ribs along the midaxillary line. Approximately T7 area  SKIN: no suspicious lesions or rashes    ASSESSMENT/PLAN:  (M54.5) Acute left-sided low back pain without sciatica  (primary encounter diagnosis)  Comment:   Plan: HYDROcodone-acetaminophen (NORCO) 5-325 MG per         tablet            32-year-old female who fell backwards injuring the left side of her rib cage. She has significant pain but she has no evidence of any bruising some pinpoint tenderness that might indicate a rib fracture. However she has no shortness of breath she has  equal breath sounds bilaterally.    At this point I would use symptomatic relief of her pain. I would I seriously anything to get the swelling down but I suspect is there.    If she does not significantly improve within the next week I certainly would have her seen again if she has any shortness of breath or would have her seen again.

## 2017-11-27 ENCOUNTER — OFFICE VISIT (OUTPATIENT)
Dept: FAMILY MEDICINE | Facility: CLINIC | Age: 32
End: 2017-11-27
Payer: COMMERCIAL

## 2017-11-27 VITALS
WEIGHT: 134 LBS | TEMPERATURE: 98.4 F | SYSTOLIC BLOOD PRESSURE: 120 MMHG | BODY MASS INDEX: 24.12 KG/M2 | HEART RATE: 78 BPM | DIASTOLIC BLOOD PRESSURE: 80 MMHG | OXYGEN SATURATION: 97 %

## 2017-11-27 DIAGNOSIS — J06.9 VIRAL URI WITH COUGH: Primary | ICD-10-CM

## 2017-11-27 PROCEDURE — 99213 OFFICE O/P EST LOW 20 MIN: CPT | Performed by: FAMILY MEDICINE

## 2017-11-27 RX ORDER — CODEINE PHOSPHATE AND GUAIFENESIN 10; 100 MG/5ML; MG/5ML
1 SOLUTION ORAL EVERY 4 HOURS PRN
Qty: 120 ML | Refills: 0 | Status: SHIPPED | OUTPATIENT
Start: 2017-11-27 | End: 2018-01-24

## 2017-11-27 RX ORDER — PROMETHAZINE HYDROCHLORIDE AND CODEINE PHOSPHATE 6.25; 1 MG/5ML; MG/5ML
5 SYRUP ORAL EVERY 6 HOURS PRN
Qty: 118 ML | Refills: 1 | Status: SHIPPED | OUTPATIENT
Start: 2017-11-27 | End: 2017-11-27 | Stop reason: ALTCHOICE

## 2017-11-27 RX ORDER — BENZONATATE 200 MG/1
200 CAPSULE ORAL 3 TIMES DAILY PRN
Qty: 21 CAPSULE | Refills: 0 | Status: SHIPPED | OUTPATIENT
Start: 2017-11-27 | End: 2018-01-24

## 2017-11-27 ASSESSMENT — ANXIETY QUESTIONNAIRES
GAD7 TOTAL SCORE: 2
7. FEELING AFRAID AS IF SOMETHING AWFUL MIGHT HAPPEN: NOT AT ALL
3. WORRYING TOO MUCH ABOUT DIFFERENT THINGS: SEVERAL DAYS
2. NOT BEING ABLE TO STOP OR CONTROL WORRYING: NOT AT ALL
1. FEELING NERVOUS, ANXIOUS, OR ON EDGE: NOT AT ALL
5. BEING SO RESTLESS THAT IT IS HARD TO SIT STILL: NOT AT ALL
6. BECOMING EASILY ANNOYED OR IRRITABLE: NOT AT ALL

## 2017-11-27 ASSESSMENT — PATIENT HEALTH QUESTIONNAIRE - PHQ9
SUM OF ALL RESPONSES TO PHQ QUESTIONS 1-9: 0
5. POOR APPETITE OR OVEREATING: SEVERAL DAYS

## 2017-11-27 NOTE — NURSING NOTE
"Chief Complaint   Patient presents with     Cough       Initial /80 (BP Location: Left arm, Patient Position: Chair, Cuff Size: Adult Large)  Pulse 78  Temp 98.4  F (36.9  C) (Oral)  Wt 134 lb (60.8 kg)  LMP 10/22/2017  SpO2 97%  BMI 24.12 kg/m2 Estimated body mass index is 24.12 kg/(m^2) as calculated from the following:    Height as of 3/15/17: 5' 2.5\" (1.588 m).    Weight as of this encounter: 134 lb (60.8 kg).  Medication Reconciliation: complete   Claritza Ross CMA  "

## 2017-11-27 NOTE — PATIENT INSTRUCTIONS
Try claritin 10mg or allegra 180mg daily for anti-histamine to help dry up your secretions.                    Upper Respiratory Infection   (Common Cold)   Information About Your Condition:  Description  The common cold is an infection of the head and chest caused by a virus. It is a type of upper respiratory infection (URI). It can affect your nose, throat, sinuses, and ears. A cold can also affect your windpipe, voice box, and airways and the tube that connects your middle ear and throat.  Symptoms  You usually start having cold symptoms one to three days after contact with a cold virus. Symptoms may include one or more of the following:  scratchy or sore throat   sneezing, runny or stuffy nose   cough   watery eyes   ear congestion   slight fever (99 to 100  F, or 37.2 to 37.8  C)   tiredness   headache   loss of appetite.   Causes  Over 200 different viruses can cause colds. The infection spreads when viruses are passed to others by sneezing, coughing, or touching. You may also become infected by handling objects that were touched by someone with a cold.   You are more likely to get a cold if:   You are emotionally or physically stressed.   You are tired.   You are not eating enough healthy food.   You are a smoker.   You are living or working in crowded conditions.   People tend to get fewer colds as they get older because they build up immunity to some of the viruses that can cause colds.   What You Should Do At Home (Follow-up Care)   There are no medicines that cure a cold. You can treat your symptoms with over-the-counter medicines such as aspirin, acetaminophen, ibuprofen, naproxen, nose drops or sprays, cough syrups and drops, throat lozenges, and decongestants. Check with your provider before you take any of these drugs if you are already taking other medicines.   Get lots of rest.   As long as your healthcare provider has not told you differently, drink plenty of liquids. An average adult should drink  at least 6 to 10 eight-ounce glasses of liquids that do not contain alcohol (including beer or wine), such as water, juice, or weak tea each day. One way to tell if you are drinking enough liquid is to look at the color of your urine (pee). It should be very light yellow.   Using cool-mist humidifier to increase air moisture, especially in your bedroom, may make it easier to breathe. Be sure to clean the humidifier often so that it does not grow mold or bacteria.   Use nose drops to relieve nasal congestion. You can buy nose drops or make your own. To make a solution for nose drops, add one teaspoon of salt to a quart of water.   Wash your hands often with soap and warm water for at least 15 seconds to help keep your cold from spreading to others.   Avoid close contact with others for a few days.   Cover your nose and mouth with a tissue when you cough or sneeze. Throw the tissue away in the nearest waste receptacle. If you don t have a tissue, cough into the bend of your elbow.   If you smoke, stop. If someone else in your household smokes, ask them to smoke outside. Avoid exposure to secondhand smoke. Also avoid being outdoors during high-pollution advisories.   Please keep all medicines out of the reach of children.   What You Can Do Stay Healthy  Avoid close contact with people who have a cold.   Keep your hands away from your nose and mouth.   Wash your hands often with warm water and soap for at least 15 seconds, especially during peak cold and flu season. You can also carry an alcohol-based hand  with you to clean your hands when soap and water aren t available.   Eat healthy foods, especially fruits with vitamin C, such as oranges.   Get 7 to 8 hours of sleep.   Do not smoke and avoid secondhand smoke or being outdoors during high-pollution alerts.   Keep your immunizations up to date. Get a pneumonia vaccine if you have a chronic illness or are 65 years of age or older. Get a flu shot every year in  the fall.   Call Your Healthcare Provider Right Away Or Return To The Emergency Department If:  You have trouble breathing not caused by nasal stuffiness.   You are not able to swallow your saliva.   You have a cough that gets worse or becomes painful.   You are coughing up yellowish or greenish phlegm (mucus).   The phlegm you are coughing up has streaks of blood.   You have a temperature of 101.5  F (38.6  C) or higher that lasts more than two days.   You have shaking chills.   You have a headache that lasts several days.   You have bluish, pale, or grayish lips, skin, or nails.   You have any symptoms that worry you.               Thank you for choosing [facility]  for your Health Care. It was a pleasure seeing you at your visit today. Please contact us with any questions or concerns you may have.                   Jia Ramos MD                                  To reach your [facility] care team after hours call:   [phone]    Our clinic hours are:     Monday- 7:30 am - 7:00 pm                             Tuesday through Friday- 7:30 am - 5:00 pm                                        Saturday- 8:00 am - 12:00 pm                  Phone:  [phone]    Our pharmacy hours are:     Monday  8:00 am to 7:00 pm      Tuesday through Friday 8:00am to 6:00pm                        Saturday - 9:00 am to 1:00 pm      Sunday : Closed.              Phone:  [phone]      There is also information available at our web site:  [email or web address]    If your provider ordered any lab tests and you do not receive the results within 10 business days, please call the clinic.    If you need a medication refill please contact your pharmacy.  Please allow 2 business days for your refill to be completed.    Our clinic offers telephone visits and e visits.  Please ask one of your team members to explain more.      Use [employer] (secure email communication and access to your chart) to  send your primary care provider a message or make an appointment. Ask someone on your Team how to sign up for MyChart.

## 2017-11-27 NOTE — MR AVS SNAPSHOT
After Visit Summary   11/27/2017    Jenae Villeda    MRN: [id number]           Patient Information     Date Of Birth          1985        Visit Information        Provider Department      11/27/2017 9:30 AM Jia Ramos MD [facility] [place]        Today's Diagnoses     Viral URI with cough    -  1      Care Instructions    Try claritin 10mg or allegra 180mg daily for anti-histamine to help dry up your secretions.                    Upper Respiratory Infection   (Common Cold)   Information About Your Condition:  Description  The common cold is an infection of the head and chest caused by a virus. It is a type of upper respiratory infection (URI). It can affect your nose, throat, sinuses, and ears. A cold can also affect your windpipe, voice box, and airways and the tube that connects your middle ear and throat.  Symptoms  You usually start having cold symptoms one to three days after contact with a cold virus. Symptoms may include one or more of the following:  scratchy or sore throat   sneezing, runny or stuffy nose   cough   watery eyes   ear congestion   slight fever (99 to 100  F, or 37.2 to 37.8  C)   tiredness   headache   loss of appetite.   Causes  Over 200 different viruses can cause colds. The infection spreads when viruses are passed to others by sneezing, coughing, or touching. You may also become infected by handling objects that were touched by someone with a cold.   You are more likely to get a cold if:   You are emotionally or physically stressed.   You are tired.   You are not eating enough healthy food.   You are a smoker.   You are living or working in crowded conditions.   People tend to get fewer colds as they get older because they build up immunity to some of the viruses that can cause colds.   What You Should Do At Home (Follow-up Care)   There are no medicines that cure a cold. You can treat your symptoms with over-the-counter medicines such as  aspirin, acetaminophen, ibuprofen, naproxen, nose drops or sprays, cough syrups and drops, throat lozenges, and decongestants. Check with your provider before you take any of these drugs if you are already taking other medicines.   Get lots of rest.   As long as your healthcare provider has not told you differently, drink plenty of liquids. An average adult should drink at least 6 to 10 eight-ounce glasses of liquids that do not contain alcohol (including beer or wine), such as water, juice, or weak tea each day. One way to tell if you are drinking enough liquid is to look at the color of your urine (pee). It should be very light yellow.   Using cool-mist humidifier to increase air moisture, especially in your bedroom, may make it easier to breathe. Be sure to clean the humidifier often so that it does not grow mold or bacteria.   Use nose drops to relieve nasal congestion. You can buy nose drops or make your own. To make a solution for nose drops, add one teaspoon of salt to a quart of water.   Wash your hands often with soap and warm water for at least 15 seconds to help keep your cold from spreading to others.   Avoid close contact with others for a few days.   Cover your nose and mouth with a tissue when you cough or sneeze. Throw the tissue away in the nearest waste receptacle. If you don t have a tissue, cough into the bend of your elbow.   If you smoke, stop. If someone else in your household smokes, ask them to smoke outside. Avoid exposure to secondhand smoke. Also avoid being outdoors during high-pollution advisories.   Please keep all medicines out of the reach of children.   What You Can Do Stay Healthy  Avoid close contact with people who have a cold.   Keep your hands away from your nose and mouth.   Wash your hands often with warm water and soap for at least 15 seconds, especially during peak cold and flu season. You can also carry an alcohol-based hand  with you to clean your hands when soap  and water aren t available.   Eat healthy foods, especially fruits with vitamin C, such as oranges.   Get 7 to 8 hours of sleep.   Do not smoke and avoid secondhand smoke or being outdoors during high-pollution alerts.   Keep your immunizations up to date. Get a pneumonia vaccine if you have a chronic illness or are 65 years of age or older. Get a flu shot every year in the fall.   Call Your Healthcare Provider Right Away Or Return To The Emergency Department If:  You have trouble breathing not caused by nasal stuffiness.   You are not able to swallow your saliva.   You have a cough that gets worse or becomes painful.   You are coughing up yellowish or greenish phlegm (mucus).   The phlegm you are coughing up has streaks of blood.   You have a temperature of 101.5  F (38.6  C) or higher that lasts more than two days.   You have shaking chills.   You have a headache that lasts several days.   You have bluish, pale, or grayish lips, skin, or nails.   You have any symptoms that worry you.               Thank you for choosing [facility]  for your Health Care. It was a pleasure seeing you at your visit today. Please contact us with any questions or concerns you may have.                   Jia Ramos MD                                  To reach your [facility] care team after hours call:   [phone]    Our clinic hours are:     Monday- 7:30 am - 7:00 pm                             Tuesday through Friday- 7:30 am - 5:00 pm                                        Saturday- 8:00 am - 12:00 pm                  Phone:  [phone]    Our pharmacy hours are:     Monday  8:00 am to 7:00 pm      Tuesday through Friday 8:00am to 6:00pm                        Saturday - 9:00 am to 1:00 pm      Sunday : Closed.              Phone:  [phone]      There is also information available at our web site:  [email or web address]    If your provider ordered any lab tests and you do not  receive the results within 10 business days, please call the clinic.    If you need a medication refill please contact your pharmacy.  Please allow 2 business days for your refill to be completed.    Our clinic offers telephone visits and e visits.  Please ask one of your team members to explain more.      Use [employer] (secure email communication and access to your chart) to send your primary care provider a message or make an appointment. Ask someone on your Team how to sign up for [employer].                       Follow-ups after your visit        Who to contact     If you have questions or need follow up information about today's clinic visit or your schedule please contact [facility] directly at [phone].  Normal or non-critical lab and imaging results will be communicated to you by MyChart, letter or phone within 4 business days after the clinic has received the results. If you do not hear from us within 7 days, please contact the clinic through [employer] or phone. If you have a critical or abnormal lab result, we will notify you by phone as soon as possible.  Submit refill requests through [email or web address] or call your pharmacy and they will forward the refill request to us. Please allow 3 business days for your refill to be completed.          Additional Information About Your Visit        MyChart Information     [employer] gives you secure access to your electronic health record. If you see a primary care provider, you can also send messages to your care team and make appointments. If you have questions, please call your primary care clinic.  If you do not have a primary care provider, please call [phone] and they will assist you.        Care EveryWhere ID     This is your Care EveryWhere ID. This could be used by other organizations to access your [facility] medical records  [id number]        Your Vitals Were     Pulse Temperature Last Period Pulse Oximetry BMI (Body Mass Index)       78 98.4  F  (36.9  C) (Oral) 10/22/2017 97% 24.12 kg/m2        Blood Pressure from Last 3 Encounters:   11/27/17 120/80   11/05/17 110/70   01/16/17 119/60    Weight from Last 3 Encounters:   11/27/17 134 lb (60.8 kg)   11/05/17 129 lb (58.5 kg)   03/15/17 134 lb (60.8 kg)              Today, you had the following     No orders found for display         Today's Medication Changes          These changes are accurate as of: 11/27/17 10:16 AM.  If you have any questions, ask your nurse or doctor.               Start taking these medicines.        Dose/Directions    benzonatate 200 MG capsule   Commonly known as:  TESSALON   Used for:  Viral URI with cough   Started by:  Jia Ramos MD        Dose:  200 mg   Take 1 capsule (200 mg) by mouth 3 times daily as needed for cough   Quantity:  21 capsule   Refills:  0       promethazine-codeine 6.25-10 MG/5ML syrup   Commonly known as:  PHENERGAN with codeine   Used for:  Viral URI with cough   Started by:  Jia Ramos MD        Dose:  5 mL   Take 5 mLs by mouth every 6 hours as needed for cough   Quantity:  118 mL   Refills:  1         Stop taking these medicines if you haven't already. Please contact your care team if you have questions.     HYDROcodone-acetaminophen 5-325 MG per tablet   Commonly known as:  NORCO   Stopped by:  Jia Ramos MD           IBUPROFEN PO   Stopped by:  Jia Ramos MD                Where to get your medicines      These medications were sent to [facility] [address] [address]     Phone:  [phone]     benzonatate 200 MG capsule         Some of these will need a paper prescription and others can be bought over the counter.  Ask your nurse if you have questions.     Bring a paper prescription for each of these medications     promethazine-codeine 6.25-10 MG/5ML syrup                Primary Care Provider Fax #    Provider  Not In System [phone]                Equal Access to Services     KEYSHAWN KHOURY : Hadii aad ku hadzenjerry Colbert, waabdoulda princess, lorenachris ngamandatamiko emerson, jani johnfoxnadia rangel. So [facility] [phone].    ATENCIÓN: Si habla español, tiene a leroy disposición servicios gratuitos de asistencia lingüística. Llame al [phone].    We comply with applicable federal civil rights laws and [place] laws. We do not discriminate on the basis of race, color, national origin, age, disability, sex, sexual orientation, or gender identity.            Thank you!     Thank you for choosing [facility]  for your care. Our goal is always to provide you with excellent care. Hearing back from our patients is one way we can continue to improve our services. Please take a few minutes to complete the written survey that you may receive in the mail after your visit with us. Thank you!             Your Updated Medication List - Protect others around you: Learn how to safely use, store and throw away your medicines at www.disposemymeds.org.          This list is accurate as of: 11/27/17 10:16 AM.  Always use your most recent med list.                   Brand Name Dispense Instructions for use Diagnosis    BENTYL PO      Take 10-20 mg by mouth 3 times daily as needed        benzonatate 200 MG capsule    TESSALON    21 capsule    Take 1 capsule (200 mg) by mouth 3 times daily as needed for cough    Viral URI with cough       promethazine-codeine 6.25-10 MG/5ML syrup    PHENERGAN with codeine    118 mL    Take 5 mLs by mouth every 6 hours as needed for cough    Viral URI with cough

## 2017-11-27 NOTE — PROGRESS NOTES
SUBJECTIVE:                                                    Jenae Villeda is a 32 year old female who presents to clinic today for the following health issues:    Acute Illness   Acute illness concerns: cough  Onset: x1 week - was better on Thanksgiving day , then over the weekend started to get worse after swimming with her kids in loud pool area. Lost voice later that day.     Fever: no    Chills/Sweats: YES- chills last week     Headache (location?): YES- go better on Friday    Sinus Pressure:no    Conjunctivitis:  YES: bilateral, watery    Ear Pain: no    Rhinorrhea: YES    Congestion: YES- nasal    Sore Throat: YES- sore from coughing, burning sensation     Cough: YES-non-productive, burning sensation,  productive of loose sputum- hasn't looked at it.     Wheeze: no    Decreased Appetite: no    Nausea: no    Vomiting: no    Diarrhea:  no    Dysuria/Freq.: no    Fatigue/Achiness: YES- both    Sick/Strep Exposure: YES- nephew had body aches and diarrhea, and uncle had chills, body aches, and diarrhea.      Therapies Tried and outcome: Nyquil - no relief    Patient Active Problem List   Diagnosis     Recurrent stomach ache     Mononucleosis     IBS (irritable bowel syndrome)     CARDIOVASCULAR SCREENING; LDL GOAL LESS THAN 160     Angular cheilitis     Disorder of ear, left     Perspiration excessive     Dizziness     S/P  section     Supervision of normal pregnancy in first trimester     Previous  delivery, antepartum     Labor and delivery, indication for care     Status post  delivery       Current Outpatient Prescriptions   Medication Sig Dispense Refill     benzonatate (TESSALON) 200 MG capsule Take 1 capsule (200 mg) by mouth 3 times daily as needed for cough 21 capsule 0     promethazine (PHENERGAN) 6.25 MG/5ML syrup Take 5 mLs (6.25 mg) by mouth 4 times daily as needed (cough) 120 mL 1     guaiFENesin-codeine (ROBITUSSIN AC) 100-10 MG/5ML SOLN solution Take 5 mLs by  mouth every 4 hours as needed for cough 120 mL 0     Dicyclomine HCl (BENTYL PO) Take 10-20 mg by mouth 3 times daily as needed          No Known Allergies.     Problem list and histories reviewed & adjusted, as indicated.  Additional history: as documented    Reviewed and updated as needed this visit by clinical staffTobacco  Allergies  Meds  Med Hx  Surg Hx  Fam Hx  Soc Hx      Reviewed and updated as needed this visit by Provider       ROS:   ROS: 12 point ROS neg other than the symptoms noted above.      OBJECTIVE:                                                    /80 (BP Location: Left arm, Patient Position: Chair, Cuff Size: Adult Large)  Pulse 78  Temp 98.4  F (36.9  C) (Oral)  Wt 134 lb (60.8 kg)  LMP 10/22/2017  SpO2 97%  BMI 24.12 kg/m2  Body mass index is 24.12 kg/(m^2).   GENERAL: healthy, alert, well nourished, well hydrated, no distress  HENT: ear canals- normal; TMs- normal; Nose- normal; Mouth- no ulcers, no lesions  NECK: no tenderness, no adenopathy, no asymmetry, no masses, no stiffness; thyroid- normal to palpation  RESP: lungs clear to auscultation - no rales, no rhonchi, no wheezes  CV: regular rates and rhythm, normal S1 S2, no S3 or S4 and no murmur, no click or rub -  ABDOMEN: soft, no tenderness, no  hepatosplenomegaly, no masses, normal bowel sounds  MS: extremities- no gross deformities noted, no edema.     Diagnostic test results:  none        ASSESSMENT/PLAN:                                                        ICD-10-CM    1. Viral URI with cough J06.9 benzonatate (TESSALON) 200 MG capsule    B97.89 promethazine (PHENERGAN) 6.25 MG/5ML syrup     guaiFENesin-codeine (ROBITUSSIN AC) 100-10 MG/5ML SOLN solution     DISCONTINUED: promethazine-codeine (PHENERGAN WITH CODEINE) 6.25-10 MG/5ML syrup     Please, call or return to clinic or go to the ER immediately if signs or symptoms worsen or fail to improve as anticipated.   Take the tessalon perles during the day and  the promethazine and robitussin AC syrups at night for the cough. --those will cause drowsiness.   See Patient Instructions.          Jia Ramos MD    [facility]

## 2017-11-28 ASSESSMENT — ASTHMA QUESTIONNAIRES: ACT_TOTALSCORE: 25

## 2017-11-28 ASSESSMENT — ANXIETY QUESTIONNAIRES: GAD7 TOTAL SCORE: 2

## 2018-01-24 ENCOUNTER — OFFICE VISIT (OUTPATIENT)
Dept: FAMILY MEDICINE | Facility: CLINIC | Age: 33
End: 2018-01-24
Payer: COMMERCIAL

## 2018-01-24 VITALS
WEIGHT: 133.4 LBS | TEMPERATURE: 98.5 F | OXYGEN SATURATION: 100 % | HEIGHT: 63 IN | BODY MASS INDEX: 23.64 KG/M2 | HEART RATE: 80 BPM | DIASTOLIC BLOOD PRESSURE: 74 MMHG | SYSTOLIC BLOOD PRESSURE: 116 MMHG

## 2018-01-24 DIAGNOSIS — Z12.4 SCREENING FOR MALIGNANT NEOPLASM OF CERVIX: ICD-10-CM
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
## 2018-01-24 DIAGNOSIS — R20.0 NUMBNESS OF LEFT FOOT: ICD-10-CM

## 2018-01-24 DIAGNOSIS — Z00.01 ENCOUNTER FOR ROUTINE ADULT MEDICAL EXAM WITH ABNORMAL FINDINGS: Primary | ICD-10-CM

## 2018-01-24 DIAGNOSIS — Z13.6 CARDIOVASCULAR SCREENING; LDL GOAL LESS THAN 160: ICD-10-CM

## 2018-01-24 PROCEDURE — 99395 PREV VISIT EST AGE 18-39: CPT | Performed by: FAMILY MEDICINE

## 2018-01-24 PROCEDURE — 87624 HPV HI-RISK TYP POOLED RSLT: CPT | Performed by: FAMILY MEDICINE

## 2018-01-24 PROCEDURE — G0145 SCR C/V CYTO,THINLAYER,RESCR: HCPCS | Performed by: FAMILY MEDICINE

## 2018-01-24 ASSESSMENT — ANXIETY QUESTIONNAIRES
3. WORRYING TOO MUCH ABOUT DIFFERENT THINGS: NOT AT ALL
1. FEELING NERVOUS, ANXIOUS, OR ON EDGE: NOT AT ALL
5. BEING SO RESTLESS THAT IT IS HARD TO SIT STILL: NOT AT ALL
2. NOT BEING ABLE TO STOP OR CONTROL WORRYING: NOT AT ALL
6. BECOMING EASILY ANNOYED OR IRRITABLE: SEVERAL DAYS
7. FEELING AFRAID AS IF SOMETHING AWFUL MIGHT HAPPEN: NOT AT ALL
IF YOU CHECKED OFF ANY PROBLEMS ON THIS QUESTIONNAIRE, HOW DIFFICULT HAVE THESE PROBLEMS MADE IT FOR YOU TO DO YOUR WORK, TAKE CARE OF THINGS AT HOME, OR GET ALONG WITH OTHER PEOPLE: NOT DIFFICULT AT ALL
GAD7 TOTAL SCORE: 1

## 2018-01-24 ASSESSMENT — PATIENT HEALTH QUESTIONNAIRE - PHQ9: 5. POOR APPETITE OR OVEREATING: NOT AT ALL

## 2018-01-24 NOTE — NURSING NOTE
"Chief Complaint   Patient presents with     Physical       Initial /74 (BP Location: Right arm, Patient Position: Chair, Cuff Size: Adult Regular)  Pulse 107  Temp 98.5  F (36.9  C) (Oral)  Ht 5' 2.5\" (1.588 m)  Wt 133 lb 6.4 oz (60.5 kg)  LMP 12/24/2017 (Approximate)  SpO2 100%  BMI 24.01 kg/m2 Estimated body mass index is 24.01 kg/(m^2) as calculated from the following:    Height as of this encounter: 5' 2.5\" (1.588 m).    Weight as of this encounter: 133 lb 6.4 oz (60.5 kg).  Medication Reconciliation: complete   Claritza Ross CMA  "

## 2018-01-24 NOTE — PROGRESS NOTES
SUBJECTIVE:   CC: Jenae Villeda is an 32 year old woman who presents for preventive health visit. New pt to me , but not to [facility].     Healthy Habits:    Do you get at least three servings of calcium containing foods daily (dairy, green leafy vegetables, etc.)? yes    Amount of exercise or daily activities, outside of work: 3 day(s) per week    Problems taking medications regularly No    Medication side effects: No    Have you had an eye exam in the past two years? yes    Do you see a dentist twice per year? yes    Do you have sleep apnea, excessive snoring or daytime drowsiness?no    Had cold sx's x 10 days - seems to be getting better.   Left foot distal toes - on and off gets numb and tingling feeling and sometimes a hot , burning pain.  Noticed since she Fell and hit her back in 11/2017 -was holding her son and fell down 5 stairs - hit her back really hard on the steps.  Went to [facility] . No imaging done.  Not related to weight bearing or wearing or not wearing shoes.   Crosses legs a lot.     Had spotting about 4 months ago - mid cycle. Periods normal/regular since. Usually gets strong cramps when she ovulates.         Today's PHQ-2 Score:   PHQ-2 ( 1999 Pfizer) 11/27/2017 11/21/2013   Q1: Little interest or pleasure in doing things 0 0   Q2: Feeling down, depressed or hopeless 0 0   PHQ-2 Score 0 0       Abuse: Current or Past(Physical, Sexual or Emotional)- No  Do you feel safe in your environment - Yes      Social History   Substance Use Topics     Smoking status: Never Smoker     Smokeless tobacco: Never Used     Alcohol use 0.0 oz/week      Comment: 2 drinks/week     If you drink alcohol do you typically have >3 drinks per day or >7 drinks per week? No                     Reviewed orders with patient.  Reviewed health maintenance and updated orders accordingly - Yes  BP Readings from Last 3 Encounters:   01/24/18 116/74   11/27/17 120/80   11/05/17 110/70    Wt Readings from  Last 3 Encounters:   18 133 lb 6.4 oz (60.5 kg)   17 134 lb (60.8 kg)   17 129 lb (58.5 kg)                  Patient Active Problem List   Diagnosis     Recurrent stomach ache     Mononucleosis     Irritable bowel syndrome with diarrhea- previously saw [facility] - ok for bentyl rx refill      CARDIOVASCULAR SCREENING; LDL GOAL LESS THAN 160     Angular cheilitis     Disorder of ear, left     Perspiration excessive     Dizziness     S/P  section x 2     Previous  delivery, antepartum     Status post  delivery     Past Surgical History:   Procedure Laterality Date      SECTION  2014    Procedure:  SECTION;  Surgeon: Izabela Saxena MD;  Location:  L+D      SECTION N/A 2016    Procedure:  SECTION;  Surgeon: Whitney Marshall MD;  Location:  L+D     GYN SURGERY       in  and      MASTOIDECTOMY Left 2017    Procedure: MASTOIDECTOMY;  Surgeon: Sridhar Tran MD;  Location:  OR       Social History   Substance Use Topics     Smoking status: Never Smoker     Smokeless tobacco: Never Used     Alcohol use 0.0 oz/week      Comment: 2 drinks/week     Family History   Problem Relation Age of Onset     Neurologic Disorder Mother      trouble with her feet on neurontin, back surgery     Hyperlipidemia Mother      Colon Polyps Mother      pre- cancerous     Hyperlipidemia Father      Breast Cancer Paternal Grandmother      breast ca x 2 - in remission      CANCER Paternal Grandmother      CEREBROVASCULAR DISEASE Paternal Grandmother      GASTROINTESTINAL DISEASE Maternal Grandfather      had portion of colon removed     CEREBROVASCULAR DISEASE Maternal Grandfather      Neurologic Disorder Maternal Grandfather      aneurysm     CANCER Maternal Grandfather      lung and pr-colon cancer     Colon Cancer Maternal Grandfather      GASTROINTESTINAL DISEASE Other      celiac lupus, part of colon removed as well as gallbladder      Coronary Artery Disease Maternal Grandmother      Breast Cancer Maternal Aunt       at age 61      DIABETES No family hx of          Current Outpatient Prescriptions   Medication Sig Dispense Refill     Dicyclomine HCl (BENTYL PO) Take 10-20 mg by mouth 3 times daily as needed       No Known Allergies  Recent Labs   Lab Test  17   1441  13   1050  10/22/12   1114  12   1000   LDL   --    --   77   --    ALT  51*  44   --   25   CR  0.72  0.69   --   0.74   GFRESTIMATED  >90  Non  GFR Calc    >90   --   >90   GFRESTBLACK  >90   GFR Calc    >90   --   >90   POTASSIUM  3.7  3.9   --   3.8   TSH   --   4.64  2.36  2.54      Mammogram not appropriate for this patient based on age.    No results found.      History of abnormal Pap smear: NO - age 30- 65 PAP every 3 years recommended    Lab Results   Component Value Date    PAP NIL 10/22/2012    PAP NIL 06/15/2011    PAP NIL 2010       Reviewed and updated as needed this visit by clinical staff  Tobacco  Allergies  Meds  Med Hx  Surg Hx  Fam Hx  Soc Hx        Reviewed and updated as needed this visit by Provider  Surg Hx  Fam Hx        Past Medical History:   Diagnosis Date     Diarrhea      GI problem      Headache      Heartburn      IBS (irritable bowel syndrome)      Mononucleosis     Totally recovered     Nasal congestion      Oligomenorrhea      Sore throat       Past Surgical History:   Procedure Laterality Date      SECTION  2014    Procedure:  SECTION;  Surgeon: Izabela Saxena MD;  Location:  L+D      SECTION N/A 2016    Procedure:  SECTION;  Surgeon: Whitney Marshall MD;  Location:  L+D     GYN SURGERY       in  and      MASTOIDECTOMY Left 2017    Procedure: MASTOIDECTOMY;  Surgeon: Sridhar Tran MD;  [facility]     Obstetric History       T2      L2     SAB0   TAB0   Ectopic0   Multiple0    "Live Births2       # Outcome Date GA Lbr Yazan/2nd Weight Sex Delivery Anes PTL Lv   2 Term 07/20/16 39w4d  7 lb 9.3 oz (3.439 kg) M CS-LTranv Spinal  MELY      Name: Quinton      Apgar1:  8                Apgar5: 9   1 Term 05/17/14 40w2d 11:20 / 02:45 8 lb 1.8 oz (3.68 kg) M CS-LTranv EPI  MELY      Name: Lam      Apgar1:  9                Apgar5: 9          ROS:  C: NEGATIVE for fever, chills, change in weight  I: NEGATIVE for worrisome rashes, moles or lesions  E: NEGATIVE for vision changes or irritation  ENT: NEGATIVE for ear, mouth and throat problems  R: NEGATIVE for significant cough or SOB  B: NEGATIVE for masses, tenderness or discharge  CV: NEGATIVE for chest pain, palpitations or peripheral edema  GI: NEGATIVE for nausea, abdominal pain, heartburn, or change in bowel habits  : NEGATIVE for unusual urinary or vaginal symptoms. Periods are regular.  M: NEGATIVE for significant arthralgias or myalgia  N: NEGATIVE for weakness, dizziness or paresthesias  E: NEGATIVE for temperature intolerance, skin/hair changes  P: NEGATIVE for changes in mood or affect    OBJECTIVE:   /74 (BP Location: Right arm, Patient Position: Chair, Cuff Size: Adult Regular)  Pulse 107  Temp 98.5  F (36.9  C) (Oral)  Ht 5' 2.5\" (1.588 m)  Wt 133 lb 6.4 oz (60.5 kg)  LMP 12/24/2017 (Approximate)  SpO2 100%  BMI 24.01 kg/m2  EXAM:  GENERAL: healthy, alert and no distress  EYES: Eyes grossly normal to inspection, PERRL and conjunctivae and sclerae normal  HENT: ear canals and TM's normal, nose and mouth without ulcers or lesions  NECK: no adenopathy, no asymmetry, masses, or scars and thyroid normal to palpation  RESP: lungs clear to auscultation - no rales, rhonchi or wheezes  BREAST: normal without masses, tenderness or nipple discharge and no palpable axillary masses or adenopathy  CV: regular rate and rhythm, normal S1 S2, no S3 or S4, no murmur, click or rub, no peripheral edema and peripheral pulses strong  ABDOMEN: " "soft, nontender, no hepatosplenomegaly, no masses and bowel sounds normal   (female): normal female external genitalia, normal urethral meatus, vaginal mucosa pink, moist, well rugated, and normal cervix/adnexa/uterus without masses or discharge  MS: no gross musculoskeletal defects noted, no edema  SKIN: no suspicious lesions or rashes  NEURO: Normal strength and tone, mentation intact and speech normal, normal sensation to light touch bilateral lower extremities   PSYCH: mentation appears normal, affect normal/bright    ASSESSMENT/PLAN:       ICD-10-CM    1. Encounter for routine adult medical exam with abnormal findings Z00.01    2. Numbness of left foot- with tingling and burning R20.8 NEUROLOGY ADULT REFERRAL     Vitamin B6     Vitamin B12     Folate   3. Fall down stairs, initial encounter W10.8XXA NEUROLOGY ADULT REFERRAL   4. CARDIOVASCULAR SCREENING; LDL GOAL LESS THAN 160 Z13.6 Lipid panel reflex to direct LDL Fasting     Comprehensive metabolic panel     CBC with platelets     TSH with free T4 reflex   5. Screening for malignant neoplasm of cervix Z12.4 Pap imaged thin layer screen with HPV - recommended age 30 - 65 years (select HPV order below)     HPV High Risk Types DNA Cervical       COUNSELING: Patient database completed/updated     Reviewed preventive health counseling, as reflected in patient instructions       reports that she has never smoked. She has never used smokeless tobacco.    Estimated body mass index is 24.01 kg/(m^2) as calculated from the following:    Height as of this encounter: 5' 2.5\" (1.588 m).    Weight as of this encounter: 133 lb 6.4 oz (60.5 kg).     Consider MRI of lumbar spine to rule out disc or other nerve impingement issue if EMG doesn't help us with cause of pt's numbness and tingling and burning pain intermittently in the left foot.     Counseling Resources:  ATP IV Guidelines  Pooled Cohorts Equation Calculator  Breast Cancer Risk Calculator  FRAX Risk " Assessment  ICSI Preventive Guidelines  Dietary Guidelines for Americans, 2010  USDA's MyPlate  ASA Prophylaxis  Lung CA Screening    Jia Ramos MD  [facility] [place]

## 2018-01-24 NOTE — MR AVS SNAPSHOT
After Visit Summary   1/24/2018    Jenae Villeda    MRN: [id number]           Patient Information     Date Of Birth          1985        Visit Information        Provider Department      1/24/2018 2:30 PM Jia Ramos MD [facility] [place]        Today's Diagnoses     Encounter for routine adult medical exam with abnormal findings    -  1    Numbness of left foot- with tingling and burning        Fall down stairs, initial encounter        CARDIOVASCULAR SCREENING; LDL GOAL LESS THAN 160        Screening for malignant neoplasm of cervix          Care Instructions      Preventive Health Recommendations  Female Ages 26 - 39  Yearly exam:   See your health care provider every year in order to    Review health changes.     Discuss preventive care.      Review your medicines if you your doctor has prescribed any.    Until age 30: Get a Pap test every three years (more often if you have had an abnormal result).    After age 30: Talk to your doctor about whether you should have a Pap test every 3 years or have a Pap test with HPV screening every 5 years.   You do not need a Pap test if your uterus was removed (hysterectomy) and you have not had cancer.  You should be tested each year for STDs (sexually transmitted diseases), if you're at risk.   Talk to your provider about how often to have your cholesterol checked.  If you are at risk for diabetes, you should have a diabetes test (fasting glucose).  Shots: Get a flu shot each year. Get a tetanus shot every 10 years.   Nutrition:     Eat at least 5 servings of fruits and vegetables each day.    Eat whole-grain bread, whole-wheat pasta and brown rice instead of white grains and rice.    Talk to your provider about Calcium and Vitamin D.     Lifestyle    Exercise at least 150 minutes a week (30 minutes a day, 5 days of the week). This will help you control your weight and prevent disease.    Limit alcohol to one drink per  day.    No smoking.     Wear sunscreen to prevent skin cancer.    See your dentist every six months for an exam and cleaning.               Thank you for choosing [facility]  for your Health Care. It was a pleasure seeing you at your visit today. Please contact us with any questions or concerns you may have.                   Jia Ramos MD                                  To reach your [facility] care team after hours call:   [phone]    Our clinic hours are:     Monday- 7:30 am - 7:00 pm                             Tuesday through Friday- 7:30 am - 5:00 pm                                        Saturday- 8:00 am - 12:00 pm                  Phone:  [phone]    Our pharmacy hours are:     Monday  8:00 am to 7:00 pm      Tuesday through Friday 8:00am to 6:00pm                        Saturday - 9:00 am to 1:00 pm      Sunday : Closed.              Phone:  [phone]      There is also information available at our web site:  [email or web address]    If your provider ordered any lab tests and you do not receive the results within 10 business days, please call the clinic.    If you need a medication refill please contact your pharmacy.  Please allow 2 business days for your refill to be completed.    Our clinic offers telephone visits and e visits.  Please ask one of your team members to explain more.      Use [employer] (secure email communication and access to your chart) to send your primary care provider a message or make an appointment. Ask someone on your Team how to sign up for [employer].                         Follow-ups after your visit        Additional Services     NEUROLOGY ADULT REFERRAL       Your provider has referred you for the following:   EMG at [facility]: Neurology Clinic [facility] [phone]   [email or web address]  General Neurology    Please be aware that coverage of these services is subject to the terms and  limitations of your health insurance plan.  Call member services at your health plan with any benefit or coverage questions.      Please bring the following with you to your appointment:    (1) Any X-Rays, CTs or MRIs which have been performed.  Contact the facility where they were done to arrange for  prior to your scheduled appointment.    (2) List of current medications  (3) This referral request   (4) Any documents/labs given to you for this referral                  Future tests that were ordered for you today     Open Future Orders        Priority Expected Expires Ordered    Lipid panel reflex to direct LDL Fasting Routine 1/26/2018 4/24/2018 1/24/2018    Comprehensive metabolic panel Routine 1/26/2018 4/24/2018 1/24/2018    CBC with platelets Routine 1/26/2018 4/24/2018 1/24/2018    TSH with free T4 reflex Routine 1/26/2018 4/24/2018 1/24/2018    Vitamin B6 Routine 1/26/2018 4/24/2018 1/24/2018    Vitamin B12 Routine 1/26/2018 4/24/2018 1/24/2018    Folate Routine 1/26/2018 4/24/2018 1/24/2018            Who to contact     If you have questions or need follow up information about today's clinic visit or your schedule please contact [facility] directly at [phone].  Normal or non-critical lab and imaging results will be communicated to you by [employer], letter or phone within 4 business days after the clinic has received the results. If you do not hear from us within 7 days, please contact the clinic through [employer] or phone. If you have a critical or abnormal lab result, we will notify you by phone as soon as possible.  Submit refill requests through [employer] or call your pharmacy and they will forward the refill request to us. Please allow 3 business days for your refill to be completed.          Additional Information About Your Visit        [employer] Information     [employer] gives you secure access to your electronic health record. If you see a primary care provider, you can also send  "messages to your care team and make appointments. If you have questions, please call your primary care clinic.  If you do not have a primary care provider, please call [phone] and they will assist you.        Care EveryWhere ID     This is your Care EveryWhere ID. This could be used by other organizations to access your [place] medical records  [id number]        Your Vitals Were     Pulse Temperature Height Last Period Pulse Oximetry BMI (Body Mass Index)    80 98.5  F (36.9  C) (Oral) 5' 2.5\" (1.588 m) 12/24/2017 (Approximate) 100% 24.01 kg/m2       Blood Pressure from Last 3 Encounters:   01/24/18 116/74   11/27/17 120/80   11/05/17 110/70    Weight from Last 3 Encounters:   01/24/18 133 lb 6.4 oz (60.5 kg)   11/27/17 134 lb (60.8 kg)   11/05/17 129 lb (58.5 kg)              We Performed the Following     HPV High Risk Types DNA Cervical     NEUROLOGY ADULT REFERRAL     Pap imaged thin layer screen with HPV - recommended age 30 - 65 years (select HPV order below)        Primary Care Provider Fax #    Provider Not In System [phone]                Equal Access to Services     KEYSHAWN KHOURY AH: Hadii cheyanne Colbert, waabdoulda lusharri, qaybta kaalmada adealizeda, jani rangel. So [facility] [phone].    ATENCIÓN: Si habla español, tiene a leroy disposición servicios gratuitos de asistencia lingüística. Llame al [phone].    We comply with applicable federal civil rights laws and [place] laws. We do not discriminate on the basis of race, color, national origin, age, disability, sex, sexual orientation, or gender identity.            Thank you!     Thank you for choosing [facility] [place]  for your care. Our goal is always to provide you with excellent care. Hearing back from our patients is one way we can continue to improve our services. Please take a few minutes to complete the written survey that you may receive in the mail after your visit with us. Thank " you!             Your Updated Medication List - Protect others around you: Learn how to safely use, store and throw away your medicines at www.disposemymeds.org.          This list is accurate as of 1/24/18  3:33 PM.  Always use your most recent med list.                   Brand Name Dispense Instructions for use Diagnosis    BENTYL PO      Take 10-20 mg by mouth 3 times daily as needed

## 2018-01-24 NOTE — PATIENT INSTRUCTIONS
Preventive Health Recommendations  Female Ages 26 - 39  Yearly exam:   See your health care provider every year in order to    Review health changes.     Discuss preventive care.      Review your medicines if you your doctor has prescribed any.    Until age 30: Get a Pap test every three years (more often if you have had an abnormal result).    After age 30: Talk to your doctor about whether you should have a Pap test every 3 years or have a Pap test with HPV screening every 5 years.   You do not need a Pap test if your uterus was removed (hysterectomy) and you have not had cancer.  You should be tested each year for STDs (sexually transmitted diseases), if you're at risk.   Talk to your provider about how often to have your cholesterol checked.  If you are at risk for diabetes, you should have a diabetes test (fasting glucose).  Shots: Get a flu shot each year. Get a tetanus shot every 10 years.   Nutrition:     Eat at least 5 servings of fruits and vegetables each day.    Eat whole-grain bread, whole-wheat pasta and brown rice instead of white grains and rice.    Talk to your provider about Calcium and Vitamin D.     Lifestyle    Exercise at least 150 minutes a week (30 minutes a day, 5 days of the week). This will help you control your weight and prevent disease.    Limit alcohol to one drink per day.    No smoking.     Wear sunscreen to prevent skin cancer.    See your dentist every six months for an exam and cleaning.               Thank you for choosing [facility]  for your Health Care. It was a pleasure seeing you at your visit today. Please contact us with any questions or concerns you may have.                   Jia Ramos MD                                  To reach your [facility] care team after hours call:   [phone]    Our clinic hours are:     Monday- 7:30 am - 7:00 pm                             Tuesday through Friday- 7:30 am - 5:00 pm                                         Saturday- 8:00 am - 12:00 pm                  Phone:  [phone]    Our pharmacy hours are:     Monday  8:00 am to 7:00 pm      Tuesday through Friday 8:00am to 6:00pm                        Saturday - 9:00 am to 1:00 pm      Sunday : Closed.              Phone:  [phone]      There is also information available at our web site:  [email or web address]    If your provider ordered any lab tests and you do not receive the results within 10 business days, please call the clinic.    If you need a medication refill please contact your pharmacy.  Please allow 2 business days for your refill to be completed.    Our clinic offers telephone visits and e visits.  Please ask one of your team members to explain more.      Use [employer] (secure email communication and access to your chart) to send your primary care provider a message or make an appointment. Ask someone on your Team how to sign up for [employer].

## 2018-01-25 ASSESSMENT — PATIENT HEALTH QUESTIONNAIRE - PHQ9: SUM OF ALL RESPONSES TO PHQ QUESTIONS 1-9: 1

## 2018-01-25 ASSESSMENT — ANXIETY QUESTIONNAIRES: GAD7 TOTAL SCORE: 1

## 2018-01-28 DIAGNOSIS — Z13.6 CARDIOVASCULAR SCREENING; LDL GOAL LESS THAN 160: ICD-10-CM

## 2018-01-28 DIAGNOSIS — R20.0 NUMBNESS OF LEFT FOOT: ICD-10-CM

## 2018-01-28 LAB
ALBUMIN SERPL-MCNC: 3.8 G/DL (ref 3.4–5)
ALP SERPL-CCNC: 78 U/L (ref 40–150)
ALT SERPL W P-5'-P-CCNC: 35 U/L (ref 0–50)
ANION GAP SERPL CALCULATED.3IONS-SCNC: 5 MMOL/L (ref 3–14)
AST SERPL W P-5'-P-CCNC: 24 U/L (ref 0–45)
BILIRUB SERPL-MCNC: 0.4 MG/DL (ref 0.2–1.3)
BUN SERPL-MCNC: 18 MG/DL (ref 7–30)
CALCIUM SERPL-MCNC: 8.4 MG/DL (ref 8.5–10.1)
CHLORIDE SERPL-SCNC: 108 MMOL/L (ref 94–109)
CHOLEST SERPL-MCNC: 147 MG/DL
CO2 SERPL-SCNC: 26 MMOL/L (ref 20–32)
CREAT SERPL-MCNC: 0.73 MG/DL (ref 0.52–1.04)
ERYTHROCYTE [DISTWIDTH] IN BLOOD BY AUTOMATED COUNT: 11.9 % (ref 10–15)
FOLATE SERPL-MCNC: 31.6 NG/ML
GFR SERPL CREATININE-BSD FRML MDRD: >90 ML/MIN/1.7M2
GLUCOSE SERPL-MCNC: 86 MG/DL (ref 70–99)
HCT VFR BLD AUTO: 41.4 % (ref 35–47)
HDLC SERPL-MCNC: 61 MG/DL
HGB BLD-MCNC: 13.8 G/DL (ref 11.7–15.7)
LDLC SERPL CALC-MCNC: 76 MG/DL
MCH RBC QN AUTO: 29 PG (ref 26.5–33)
MCHC RBC AUTO-ENTMCNC: 33.3 G/DL (ref 31.5–36.5)
MCV RBC AUTO: 87 FL (ref 78–100)
NONHDLC SERPL-MCNC: 86 MG/DL
PLATELET # BLD AUTO: 150 10E9/L (ref 150–450)
POTASSIUM SERPL-SCNC: 4 MMOL/L (ref 3.4–5.3)
PROT SERPL-MCNC: 7.7 G/DL (ref 6.8–8.8)
RBC # BLD AUTO: 4.76 10E12/L (ref 3.8–5.2)
SODIUM SERPL-SCNC: 138 MMOL/L (ref 133–144)
TRIGL SERPL-MCNC: 51 MG/DL
TSH SERPL DL<=0.005 MIU/L-ACNC: 2.48 MU/L (ref 0.4–4)
VIT B12 SERPL-MCNC: 528 PG/ML (ref 193–986)
WBC # BLD AUTO: 5.5 10E9/L (ref 4–11)

## 2018-01-28 PROCEDURE — 80061 LIPID PANEL: CPT | Performed by: FAMILY MEDICINE

## 2018-01-28 PROCEDURE — 85027 COMPLETE CBC AUTOMATED: CPT | Performed by: FAMILY MEDICINE

## 2018-01-28 PROCEDURE — 80053 COMPREHEN METABOLIC PANEL: CPT | Performed by: FAMILY MEDICINE

## 2018-01-28 PROCEDURE — 82746 ASSAY OF FOLIC ACID SERUM: CPT | Performed by: FAMILY MEDICINE

## 2018-01-28 PROCEDURE — 82607 VITAMIN B-12: CPT | Performed by: FAMILY MEDICINE

## 2018-01-28 PROCEDURE — 84443 ASSAY THYROID STIM HORMONE: CPT | Performed by: FAMILY MEDICINE

## 2018-01-28 PROCEDURE — 84207 ASSAY OF VITAMIN B-6: CPT | Performed by: FAMILY MEDICINE

## 2018-01-29 LAB
COPATH REPORT: NORMAL
PAP: NORMAL

## 2018-01-31 LAB
FINAL DIAGNOSIS: NORMAL
HPV HR 12 DNA CVX QL NAA+PROBE: NEGATIVE
HPV16 DNA SPEC QL NAA+PROBE: NEGATIVE
HPV18 DNA SPEC QL NAA+PROBE: NEGATIVE
SPECIMEN DESCRIPTION: NORMAL
SPECIMEN SOURCE CVX/VAG CYTO: NORMAL

## 2018-02-02 LAB — VIT B6 SERPL-MCNC: 36.2 NMOL/L (ref 20–125)

## 2018-02-21 ENCOUNTER — OFFICE VISIT (OUTPATIENT)
Dept: NEUROLOGY | Facility: CLINIC | Age: 33
End: 2018-02-21
Payer: COMMERCIAL

## 2018-02-21 DIAGNOSIS — R20.0 LEFT LEG NUMBNESS: Primary | ICD-10-CM

## 2018-02-21 DIAGNOSIS — M79.662 PAIN OF LEFT LOWER LEG: ICD-10-CM

## 2018-02-21 NOTE — LETTER
2/21/2018       RE: Jenae Villeda  [address] [address] [place] [id number]     Dear Colleague,    Thank you for referring your patient, Jenae Villeda, to the [facility] EMG at [facility]. Please see a copy of my visit note below.        [facility]  Electrodiagnostic Laboratory    Nerve Conduction & EMG Report          Patient: Jenae Villeda YOB: 1985  Patient ID: [id number] Age: 32 Years 7 Months  Gender: Female        History & Examination:  32 year old woman with numbness in left foot as well as intermittent pain in her left calf. Eval for focal neuropathy vs radiculopathy.     Techniques: Motor and sensory conduction studies were done with surface recording electrodes. EMG was done with a concentric needle electrode.     Results:  Nerve conduction studies:   1. Bilateral SP, left sural and left saphenous sensory responses were normal.   2. Bilateral peroneal-EDB and left tibial-AH motor responses were normal.     Needle EMG of selected proximal and distal left lower limb muscles was performed as tabulated below. No abnormal spontaneous activity was observed in the sampled muscles. Motor unit potential morphology and recruitment patterns were normal.     Interpretation:  This is a normal study. There is no electrophysiologic evidence of a focal neuropathy or lumbosacral radiculopathy affecting the left lower limb on the basis of this study.     Ricky Everett MD  Department of Neurology             Sensory NCS      Nerve / Sites Rec. Site Onset Peak NP Amp Ref. PP Amp Dist Soy Ref. Temp     ms ms  V  V  V cm m/s m/s  C   L SURAL - Lat Mall      Calf Ankle 2.55 3.39 15.4 8.0 21.1 14 54.9 38.0 31   L SUP PERONEAL      Lat Leg Blandon 2.55 3.28 14.7  20.9 12.5 49.0 38.0 31.2   R SUP PERONEAL      Lat Leg Blandon 2.34 3.07 11.7  8.2 12.5 53.3 38.0 28.8   L SAPHENOUS      Med Leg Foot 2.19 2.66 2.9  3.7 10 45.7  28.9       Motor NCS       Nerve / Sites Rec. Site Lat Ref. Amp Ref. Rel Amp Dist Soy Ref. Dur. Area Temp.     ms ms mV mV % cm m/s m/s ms %  C   L DEEP PERONEAL - EDB      Ankle EDB 4.74 6.00 4.2 2.5 100 8   5.36 100 31      FibHead EDB 10.16  4.1  97.4 26.5 48.9 38.0 5.83 88.9 31      Pop Fos EDB 11.72  4.2  100 8 51.2 38.0 5.47 94.1 31   R DEEP PERONEAL - EDB      Ankle EDB 4.74 6.00 5.3 2.5 100 8   5.10 100 28.8   L TIBIAL - AH      Ankle AH 3.13 6.00 19.0 4.0 100 8   6.72 100 31.1      Pop Fos AH 9.11  18.9  99.8 37 61.8 38.0 7.71 116 31       EMG Summary Table     Spontaneous MUAP Recruitment    IA Fib PSW Fasc H.F. Amp Dur. PPP Pattern   L. VAST LATERALIS N None None None None N N None Normal   L. TIB ANTERIOR N None None None None N N None Normal   L. GASTROCN (MED) N None None None None N N None Normal   L. PERON LONGUS N None None None None N N None Normal   L. TIB POSTERIOR N None None None None N N None Normal                          Again, thank you for allowing me to participate in the care of your patient.      Sincerely,    Ricky Everett MD

## 2018-02-21 NOTE — MR AVS SNAPSHOT
After Visit Summary   2/21/2018    Jenae Villeda    MRN: [id number]           Patient Information     Date Of Birth          1985        Visit Information        Provider Department      2/21/2018 3:45 PM Ricky Everett MD  [facility]        Today's Diagnoses     Left leg numbness    -  1    Pain of left lower leg           Follow-ups after your visit        Who to contact     Please call your clinic at [phone] to:    Ask questions about your health    Make or cancel appointments    Discuss your medicines    Learn about your test results    Speak to your doctor            Additional Information About Your Visit        MyChart Information     [email or web address] gives you secure access to your electronic health record. If you see a primary care provider, you can also send messages to your care team and make appointments. If you have questions, please call your primary care clinic.  If you do not have a primary care provider, please call [phone] and they will assist you.      [email or web address] is an electronic gateway that provides easy, online access to your medical records. With [email or web address], you can request a clinic appointment, read your test results, renew a prescription or communicate with your care team.     To access your existing account, please contact your [facility] or call [phone] for assistance.        Care EveryWhere ID     This is your Care EveryWhere ID. This could be used by other organizations to access your [place] medical records  [id number]         Blood Pressure from Last 3 Encounters:   01/24/18 116/74   11/27/17 120/80   11/05/17 110/70    Weight from Last 3 Encounters:   01/24/18 60.5 kg (133 lb 6.4 oz)   11/27/17 60.8 kg (134 lb)   11/05/17 58.5 kg (129 lb)              We Performed the Following     HC NCS MOTOR W OR W/O F-WAVE, 7 OR 8     HC NEEDLE EMG EA EXTREMTY W/PARASPINAL AREA COMPLETE        Primary Care Provider Fax #    Provider  Not In System [phone]                Equal Access to Services     KEYSHAWN KHOURY : Hadii aad ku hadzenjerry Colbert, waabdoultamiko sánchez, lorenachris moorematamiko emerson, jani johnfoxnadia rangel. So [facility] [phone].    ATENCIÓN: Si habla español, tiene a leroy disposición servicios gratuitos de asistencia lingüística. Llame al [phone].    We comply with applicable federal civil rights laws and [place] laws. We do not discriminate on the basis of race, color, national origin, age, disability, sex, sexual orientation, or gender identity.            Thank you!     Thank you for choosing [facility]  for your care. Our goal is always to provide you with excellent care. Hearing back from our patients is one way we can continue to improve our services. Please take a few minutes to complete the written survey that you may receive in the mail after your visit with us. Thank you!             Your Updated Medication List - Protect others around you: Learn how to safely use, store and throw away your medicines at www.disposemymeds.org.          This list is accurate as of 2/21/18  3:50 PM.  Always use your most recent med list.                   Brand Name Dispense Instructions for use Diagnosis    BENTYL PO      Take 10-20 mg by mouth 3 times daily as needed

## 2018-02-21 NOTE — PROGRESS NOTES
[facility]  Electrodiagnostic Laboratory    Nerve Conduction & EMG Report          Patient: Jenae Villeda YOB: 1985  Patient ID: [id number] Age: 32 Years 7 Months  Gender: Female        History & Examination:  32 year old woman with numbness in left foot as well as intermittent pain in her left calf. Eval for focal neuropathy vs radiculopathy.     Techniques: Motor and sensory conduction studies were done with surface recording electrodes. EMG was done with a concentric needle electrode.     Results:  Nerve conduction studies:   1. Bilateral SP, left sural and left saphenous sensory responses were normal.   2. Bilateral peroneal-EDB and left tibial-AH motor responses were normal.     Needle EMG of selected proximal and distal left lower limb muscles was performed as tabulated below. No abnormal spontaneous activity was observed in the sampled muscles. Motor unit potential morphology and recruitment patterns were normal.     Interpretation:  This is a normal study. There is no electrophysiologic evidence of a focal neuropathy or lumbosacral radiculopathy affecting the left lower limb on the basis of this study.     Ricky Everett MD  Department of Neurology             Sensory NCS      Nerve / Sites Rec. Site Onset Peak NP Amp Ref. PP Amp Dist Soy Ref. Temp     ms ms  V  V  V cm m/s m/s  C   L SURAL - Lat Mall      Calf Ankle 2.55 3.39 15.4 8.0 21.1 14 54.9 38.0 31   L SUP PERONEAL      Lat Leg Blandon 2.55 3.28 14.7  20.9 12.5 49.0 38.0 31.2   R SUP PERONEAL      Lat Leg B[place] 2.34 3.07 11.7  8.2 12.5 53.3 38.0 28.8   L SAPHENOUS      Med Leg Foot 2.19 2.66 2.9  3.7 10 45.7  28.9       Motor NCS      Nerve / Sites Rec. Site Lat Ref. Amp Ref. Rel Amp Dist Soy Ref. Dur. Area Temp.     ms ms mV mV % cm m/s m/s ms %  C   L DEEP PERONEAL - EDB      Ankle EDB 4.74 6.00 4.2 2.5 100 8   5.36 100 31      FibHead EDB 10.16  4.1  97.4 26.5 48.9 38.0 5.83 88.9 31      Pop Fos EDB 11.72  4.2  100 8  51.2 38.0 5.47 94.1 31   R DEEP PERONEAL - EDB      Ankle EDB 4.74 6.00 5.3 2.5 100 8   5.10 100 28.8   L TIBIAL - AH      Ankle AH 3.13 6.00 19.0 4.0 100 8   6.72 100 31.1      Pop Fos AH 9.11  18.9  99.8 37 61.8 38.0 7.71 116 31       EMG Summary Table     Spontaneous MUAP Recruitment    IA Fib PSW Fasc H.F. Amp Dur. PPP Pattern   L. VAST LATERALIS N None None None None N N None Normal   L. TIB ANTERIOR N None None None None N N None Normal   L. GASTROCN (MED) N None None None None N N None Normal   L. PERON LONGUS N None None None None N N None Normal   L. TIB POSTERIOR N None None None None N N None Normal

## 2018-05-08 ENCOUNTER — OFFICE VISIT (OUTPATIENT)
Dept: FAMILY MEDICINE | Facility: CLINIC | Age: 33
End: 2018-05-08
Payer: COMMERCIAL

## 2018-05-08 VITALS
OXYGEN SATURATION: 99 % | HEIGHT: 63 IN | BODY MASS INDEX: 23.74 KG/M2 | DIASTOLIC BLOOD PRESSURE: 70 MMHG | WEIGHT: 134 LBS | TEMPERATURE: 98.5 F | SYSTOLIC BLOOD PRESSURE: 122 MMHG | HEART RATE: 85 BPM

## 2018-05-08 DIAGNOSIS — J02.9 SORE THROAT: Primary | ICD-10-CM

## 2018-05-08 LAB
DEPRECATED S PYO AG THROAT QL EIA: NORMAL
SPECIMEN SOURCE: NORMAL

## 2018-05-08 PROCEDURE — 87081 CULTURE SCREEN ONLY: CPT | Performed by: PHYSICIAN ASSISTANT

## 2018-05-08 PROCEDURE — 99213 OFFICE O/P EST LOW 20 MIN: CPT | Performed by: PHYSICIAN ASSISTANT

## 2018-05-08 PROCEDURE — 87880 STREP A ASSAY W/OPTIC: CPT | Performed by: PHYSICIAN ASSISTANT

## 2018-05-08 NOTE — PROGRESS NOTES
"  SUBJECTIVE:                                                    Jenae Villeda is a 32 year old female who presents to clinic today for the following health issues:    Requesting Bentyl be refilled - [facility] has been prescribing it.      Acute Illness   Acute illness concerns: Sore throat  Onset: x2 days    Fever: YES- 100.3 - yesterday - 98.5-O today    Chills/Sweats: YES- sweats    Headache (location?): YES- all over    Sinus Pressure:no    Conjunctivitis:  YES - watery    Ear Pain: no    Rhinorrhea: YES- clear-white    Congestion: YES- nasal    Sore Throat: YES- constant     Cough: YES-non-productive    Wheeze: no    Decreased Appetite: YES- entire time    Nausea: no    Vomiting: no    Diarrhea:  no    Dysuria/Freq.: no    Fatigue/Achiness: YES- muscle aches yesterday - upper back - decreased energy    Sick/Strep Exposure: YES - kids cols-vital conjuncitivits     Therapies Tried and outcome: Tylenol and Ibuprofen last dose 7am - moderate relief      Patient reports that she is not feeling well.  She says that Sunday night she didn't have an appetite and had low energy.  She went to bed, but didn't sleep very well.  She woke up early Monday morning and felt really hot.  She took Advil and felt better.  She says that she has a sore throat and some mild congestion.  She is having back pain/aches with the symptoms.  She says that tylenol and ibuprofen do make her symptoms feel a lot better.        She took ibuprofen at 7 am today.        Problem list and histories reviewed & adjusted, as indicated.  Additional history: as documented      ROS:  Constitutional, HEENT, cardiovascular, pulmonary, GI, , musculoskeletal, neuro, skin, endocrine and psych systems are negative, except as otherwise noted.    OBJECTIVE:                                                    /70 (BP Location: Left arm, Patient Position: Chair, Cuff Size: Adult Regular)  Pulse 85  Temp 98.5  F (36.9  C) (Oral)  Ht 5' 2.5\" (1.588 m)  " Wt 134 lb (60.8 kg)  SpO2 99%  BMI 24.12 kg/m2  Body mass index is 24.12 kg/(m^2).  GENERAL: healthy, alert and no distress  EYES: Eyes grossly normal to inspection, PERRL and conjunctivae and sclerae normal  HENT: ear canals and TM's normal, nose and mouth without ulcers or lesions, mild erythema of the pharynx, tonsillar stone noted on the right tonsil.  No edema and airway is non-obstructed.    NECK: no adenopathy, no asymmetry, masses, or scars and thyroid normal to palpation  RESP: lungs clear to auscultation - no rales, rhonchi or wheezes  CV: regular rate and rhythm, normal S1 S2, no S3 or S4, no murmur, click or rub, no peripheral edema and peripheral pulses strong  MS: no gross musculoskeletal defects noted, no edema  NEURO: Normal strength and tone, mentation intact and speech normal  PSYCH: mentation appears normal, affect normal/bright    Diagnostic Test Results:  Results for orders placed or performed in visit on 05/08/18 (from the past 24 hour(s))   Strep, Rapid Screen   Result Value Ref Range    Specimen Description Throat     Rapid Strep A Screen       NEGATIVE: No Group A streptococcal antigen detected by immunoassay, await culture report.        ASSESSMENT/PLAN:                                                      Jenae was seen today for pharyngitis.    Diagnoses and all orders for this visit:    Sore throat  -     Strep, Rapid Screen  -     Beta strep group A culture    - Symptoms likely of a viral etiology.    - Supportive cares discussed with the tylenol and/or ibuprofen.     - Patient advised to followup if not improving.  She should seek more immediate care if symptoms change or worsen in any way.     -- I have discussed the patient's diagnosis, and my plan of treatment with the patient and/or family. Patient is aware to followup if symptoms do not improve.  Patient has been advised to be seen sooner or seek more immediate care if symptoms change or worsen.  Patient agrees with and  understands the plan today.     See Patient Instructions        Ashwini Rios PA-C    [facility] [place]

## 2018-05-08 NOTE — MR AVS SNAPSHOT
"              After Visit Summary   5/8/2018    Jenae Villeda    MRN: [id number]           Patient Information     Date Of Birth          1985        Visit Information        Provider Department      5/8/2018 10:20 AM Ashwini Rios PA-C [facility]        Today's Diagnoses     Sore throat    -  1       Follow-ups after your visit        Follow-up notes from your care team     Return in about 3 days (around 5/11/2018) for If not improving, please be seen sooner if needed.      Who to contact     If you have questions or need follow up information about today's clinic visit or your schedule please contact [facility] directly at [phone].  Normal or non-critical lab and imaging results will be communicated to you by MyChart, letter or phone within 4 business days after the clinic has received the results. If you do not hear from us within 7 days, please contact the clinic through [employer] or phone. If you have a critical or abnormal lab result, we will notify you by phone as soon as possible.  Submit refill requests through [employer] or call your pharmacy and they will forward the refill request to us. Please allow 3 business days for your refill to be completed.          Additional Information About Your Visit        MyChart Information     [employer] gives you secure access to your electronic health record. If you see a primary care provider, you can also send messages to your care team and make appointments. If you have questions, please call your primary care clinic.  If you do not have a primary care provider, please call [phone] and they will assist you.        Care EveryWhere ID     This is your Care EveryWhere ID. This could be used by other organizations to access your [place] medical records  [id number]        Your Vitals Were     Pulse Temperature Height Pulse Oximetry BMI (Body Mass Index)       85 98.5  F (36.9  C) (Oral) 5' 2.5\" (1.588 m) 99% 24.12 " kg/m2        Blood Pressure from Last 3 Encounters:   05/08/18 122/70   01/24/18 116/74   11/27/17 120/80    Weight from Last 3 Encounters:   05/08/18 134 lb (60.8 kg)   01/24/18 133 lb 6.4 oz (60.5 kg)   11/27/17 134 lb (60.8 kg)              We Performed the Following     Beta strep group A culture     Strep, Rapid Screen        Primary Care Provider Fax #    Provider Not In System [phone]                Equal Access to Services     KEYSHAWN KHOURY : Hadii cheyanne ku hadasho Soomaali, waaxda luqadaha, qaybta kaalmada adearlinyada, jani santiago . So [facility] [phone].    ATENCIÓN: Si habla español, tiene a leroy disposición servicios gratuitos de asistencia lingüística. Llame al [phone].    We comply with applicable federal civil rights laws and [place] laws. We do not discriminate on the basis of race, color, national origin, age, disability, sex, sexual orientation, or gender identity.            Thank you!     Thank you for choosing [facility]  for your care. Our goal is always to provide you with excellent care. Hearing back from our patients is one way we can continue to improve our services. Please take a few minutes to complete the written survey that you may receive in the mail after your visit with us. Thank you!             Your Updated Medication List - Protect others around you: Learn how to safely use, store and throw away your medicines at www.disposemymeds.org.          This list is accurate as of 5/8/18 10:57 AM.  Always use your most recent med list.                   Brand Name Dispense Instructions for use Diagnosis    BENTYL PO      Take 10-20 mg by mouth 3 times daily as needed

## 2018-05-09 ENCOUNTER — MYC MEDICAL ADVICE (OUTPATIENT)
Dept: FAMILY MEDICINE | Facility: CLINIC | Age: 33
End: 2018-05-09

## 2018-05-09 LAB
BACTERIA SPEC CULT: NORMAL
SPECIMEN SOURCE: NORMAL

## 2018-08-28 ENCOUNTER — HOSPITAL ENCOUNTER (OUTPATIENT)
Dept: ULTRASOUND IMAGING | Facility: CLINIC | Age: 33
End: 2018-08-28
Attending: FAMILY MEDICINE
Payer: COMMERCIAL

## 2018-08-28 ENCOUNTER — HOSPITAL ENCOUNTER (OUTPATIENT)
Dept: MAMMOGRAPHY | Facility: CLINIC | Age: 33
Discharge: HOME | End: 2018-08-28
Attending: FAMILY MEDICINE | Admitting: FAMILY MEDICINE
Payer: COMMERCIAL

## 2018-08-28 DIAGNOSIS — N64.4 BREAST PAIN, LEFT: ICD-10-CM

## 2018-08-28 PROCEDURE — 76642 ULTRASOUND BREAST LIMITED: CPT | Mod: LT

## 2018-08-28 PROCEDURE — G0279 TOMOSYNTHESIS, MAMMO: HCPCS

## 2018-08-28 NOTE — LETTER
Jenae Villeda  [id number] [address] [place] [id number]    August 28, 2018  Date of Exam:     Dear Jenae:    Thank you for your recent visit.  Breast Imaging Result: We are pleased to inform you that the results of your recent breast imaging show no evidence of malignancy (cancer).    Breast Density: Your mammogram shows that you have dense breast tissue. This means you have a slightly higher risk of getting breast cancer. It also means your mammograms will be harder to read but it doesn't mean that mammograms aren t useful. In fact, yearly mammograms are even more important for women at higher risk.    If you are experiencing any breast problems such as a lump or localized pain we request that you discuss this with your health care provider if you haven t already done so, as additional testing may be necessary.    As you know, early detection of cancer is very important. Although mammography is the most accurate method for early detection, not all cancers are found through mammography. A thorough examination includes a combination of mammography, physical examination and breast self-examination. Currently the American College of Radiology and the Society of Breast Imaging recommend an annual mammogram for all women beginning at the age of 40.    A report of your breast imaging results was sent to: Jia Ramos    Your breast imaging will become part of your medical file here at [facility] for at least 10 years. You are responsible for informing any new health care provider or breast imaging facility of the date and location of this examination.    We appreciate the opportunity to participate in your health care.    Sincerely,    Shawn Juares MD  Interpreting Radiologist  [facility] Breast Ultrasound

## 2018-08-28 NOTE — PROGRESS NOTES
Jenae  Here are your recent results.  They are normal.  If you have any questions please do not hesitate to contact our office via phone ([phone]) or MyChart.    Edel Fernandes MS, PA-C  [facility]

## 2019-02-27 ENCOUNTER — ALLIED HEALTH/NURSE VISIT (OUTPATIENT)
Dept: NURSING | Facility: CLINIC | Age: 34
End: 2019-02-27
Payer: COMMERCIAL

## 2019-02-27 DIAGNOSIS — Z11.1 VISIT FOR MANTOUX TEST: Primary | ICD-10-CM

## 2019-02-27 PROCEDURE — 99207 ZZC NO CHARGE NURSE ONLY: CPT

## 2019-02-27 NOTE — PROGRESS NOTES
See scanned sheet     RESULTS WERE NEGATIVE     0 INDURATION   0 REDNESS    Jenae Jimenes RN, BSN  [place] [facility]

## 2019-08-12 ENCOUNTER — TELEPHONE (OUTPATIENT)
Dept: OTOLARYNGOLOGY | Facility: CLINIC | Age: 34
End: 2019-08-12

## 2019-08-14 DIAGNOSIS — H93.92: Primary | ICD-10-CM

## 2019-09-28 ENCOUNTER — ALLIED HEALTH/NURSE VISIT (OUTPATIENT)
Dept: NURSING | Facility: CLINIC | Age: 34
End: 2019-09-28
Payer: COMMERCIAL

## 2019-09-28 DIAGNOSIS — Z23 NEED FOR PROPHYLACTIC VACCINATION AND INOCULATION AGAINST INFLUENZA: Primary | ICD-10-CM

## 2019-09-28 PROCEDURE — 90686 IIV4 VACC NO PRSV 0.5 ML IM: CPT

## 2019-09-28 PROCEDURE — 90471 IMMUNIZATION ADMIN: CPT

## 2019-11-05 ENCOUNTER — HEALTH MAINTENANCE LETTER (OUTPATIENT)
Age: 34
End: 2019-11-05

## 2019-12-02 ENCOUNTER — OFFICE VISIT (OUTPATIENT)
Dept: FAMILY MEDICINE | Facility: CLINIC | Age: 34
End: 2019-12-02
Payer: COMMERCIAL

## 2019-12-02 VITALS
BODY MASS INDEX: 24.52 KG/M2 | TEMPERATURE: 97.8 F | HEART RATE: 79 BPM | DIASTOLIC BLOOD PRESSURE: 72 MMHG | OXYGEN SATURATION: 96 % | WEIGHT: 138.4 LBS | SYSTOLIC BLOOD PRESSURE: 122 MMHG | HEIGHT: 63 IN

## 2019-12-02 DIAGNOSIS — H65.111 NON-RECURRENT ACUTE ALLERGIC OTITIS MEDIA OF RIGHT EAR: ICD-10-CM

## 2019-12-02 DIAGNOSIS — H92.01 RIGHT EAR PAIN: Primary | ICD-10-CM

## 2019-12-02 RX ORDER — NEOMYCIN SULFATE, POLYMYXIN B SULFATE, HYDROCORTISONE 3.5; 10000; 1 MG/ML; [USP'U]/ML; MG/ML
3 SOLUTION/ DROPS AURICULAR (OTIC) 4 TIMES DAILY
Qty: 10 ML | Refills: 0 | Status: SHIPPED | OUTPATIENT
Start: 2019-12-02 | End: 2020-01-11

## 2019-12-02 RX ORDER — CEFDINIR 300 MG/1
600 CAPSULE ORAL DAILY
Qty: 10 CAPSULE | Refills: 0 | Status: SHIPPED | OUTPATIENT
Start: 2019-12-02 | End: 2020-01-11

## 2019-12-02 ASSESSMENT — MIFFLIN-ST. JEOR: SCORE: 1291.91

## 2019-12-02 ASSESSMENT — PAIN SCALES - GENERAL: PAINLEVEL: NO PAIN (0)

## 2019-12-02 NOTE — PROGRESS NOTES
"       HPI       Jenae Villeda is a 34 year old woman who presents with right ear pain and a plugged sensation. Her symptoms started about one week ago. The left ear is ok, no pain, just feels different. The right ear is painful and drains at times. She presents for exam and evaluation.   Chief Complaint   Patient presents with     Ear Problem     Pt is here to discuss possible ear infection on both ears.      Problem, Medication and Allergy Lists were reviewed and updated if needed.    Patient is an established patient of this clinic.         Review of Systems:   Review of Systems     Constitutional:  Positive for fatigue. Negative for fever and chills.   HENT:  Positive for ear pain.                Physical Exam:     Vitals:    12/02/19 1544   BP: 122/72   Pulse: 79   Temp: 97.8  F (36.6  C)   SpO2: 96%   Weight: 62.8 kg (138 lb 6.4 oz)   Height: 1.6 m (5' 3\")     Body mass index is 24.52 kg/m .  Vitals were reviewed and were normal     Physical Exam  Constitutional:       Appearance: Normal appearance.   HENT:      Head: Normocephalic.      Right Ear: Decreased hearing noted. Drainage, swelling and tenderness present. Tympanic membrane is erythematous.      Left Ear: Hearing, tympanic membrane, ear canal and external ear normal.      Nose: Nose normal.      Mouth/Throat:      Lips: Pink.      Mouth: Mucous membranes are moist.      Pharynx: Oropharynx is clear.   Eyes:      Extraocular Movements: Extraocular movements intact.      Pupils: Pupils are equal, round, and reactive to light.   Cardiovascular:      Rate and Rhythm: Normal rate and regular rhythm.      Pulses: Normal pulses.      Heart sounds: Normal heart sounds.   Pulmonary:      Effort: Pulmonary effort is normal.      Breath sounds: Normal breath sounds.   Musculoskeletal: Normal range of motion.   Skin:     General: Skin is warm and dry.   Neurological:      General: No focal deficit present.      Mental Status: She is alert and oriented to " person, place, and time.   Psychiatric:         Mood and Affect: Mood normal.         Behavior: Behavior normal.         Results:      no diagnostics ordered today    Assessment and Plan     1. Right ear pain    - neomycin-polymyxin-hydrocortisone (CORTISPORIN) 3.5-20132-4 otic solution; Place 3 drops into the right ear 4 times daily  Dispense: 10 mL; Refill: 0    2. Non-recurrent acute allergic otitis media of right ear    - cefdinir (OMNICEF) 300 MG capsule; Take 2 capsules (600 mg) by mouth daily for 5 days  Dispense: 10 capsule; Refill: 0      There are no discontinued medications.  Total time spent 25 minutes.  More than 50% of the time spent with patient on counseling / coordinating her care.Patient instructions: first, start ear drops and oral medication for your right sided ear infection. Next, continue otc medications for symptom management. Next, call with worsening or persisting symptoms. Thank you. Options for treatment and follow-up care were reviewed with the patient. Jenae Villeda  engaged in the decision making process and verbalized understanding of the options discussed and agreed with the final plan.  Cristiana Bradford, APRN, CNP

## 2019-12-02 NOTE — NURSING NOTE
"34 year old  Chief Complaint   Patient presents with     Ear Problem     Pt is here to discuss possible ear infection on both ears.        Blood pressure 122/72, pulse 79, temperature 97.8  F (36.6  C), height 1.6 m (5' 3\"), weight 62.8 kg (138 lb 6.4 oz), SpO2 96 %, not currently breastfeeding. Body mass index is 24.52 kg/m .  BP completed using cuff size:      CLAUDINE Mcdonough  December 2, 2019 3:47 PM  "

## 2019-12-02 NOTE — PATIENT INSTRUCTIONS
First, start ear drops and oral medication. Next, continue otc medications for symptom management. Next, call with worsening or persisting symptoms. Thank you.   [facility] Medication Refill Request Information:  * Please contact your pharmacy regarding ANY request for medication refills.  ** NP Clinic Prescription Fax = [phone]  * Please allow 3 business days for routine medication refills.  * Please allow 5 business days for controlled substance medication refills.     [facility] Test Result notification information:  *You will be notified with in 7-10 days of your appointment day regarding the results of your test.  If you are on MyChart you will be notified as soon as the provider has reviewed the results and signed off on them.    [facility]: [phone]

## 2019-12-04 ASSESSMENT — ENCOUNTER SYMPTOMS
CHILLS: 0
FATIGUE: 1
FEVER: 0

## 2020-01-10 ENCOUNTER — APPOINTMENT (OUTPATIENT)
Dept: LAB | Facility: CLINIC | Age: 35
End: 2020-01-10
Attending: PHYSICIAN ASSISTANT
Payer: COMMERCIAL

## 2020-01-10 DIAGNOSIS — J02.0 STREPTOCOCCAL SORE THROAT: Primary | ICD-10-CM

## 2020-01-10 LAB
DEPRECATED S PYO AG THROAT QL EIA: NORMAL
SPECIMEN SOURCE: NORMAL

## 2020-01-10 PROCEDURE — 87880 STREP A ASSAY W/OPTIC: CPT | Performed by: PHYSICIAN ASSISTANT

## 2020-01-10 PROCEDURE — 87081 CULTURE SCREEN ONLY: CPT | Performed by: PHYSICIAN ASSISTANT

## 2020-01-10 PROCEDURE — 87077 CULTURE AEROBIC IDENTIFY: CPT | Performed by: PHYSICIAN ASSISTANT

## 2020-01-11 ENCOUNTER — OFFICE VISIT (OUTPATIENT)
Dept: FAMILY MEDICINE | Facility: CLINIC | Age: 35
End: 2020-01-11
Payer: COMMERCIAL

## 2020-01-11 VITALS
HEIGHT: 63 IN | SYSTOLIC BLOOD PRESSURE: 98 MMHG | OXYGEN SATURATION: 99 % | TEMPERATURE: 98.5 F | BODY MASS INDEX: 24.1 KG/M2 | DIASTOLIC BLOOD PRESSURE: 60 MMHG | HEART RATE: 102 BPM | WEIGHT: 136 LBS

## 2020-01-11 DIAGNOSIS — J02.9 SORE THROAT: Primary | ICD-10-CM

## 2020-01-11 LAB
DEPRECATED S PYO AG THROAT QL EIA: NORMAL
SPECIMEN SOURCE: NORMAL

## 2020-01-11 PROCEDURE — 99213 OFFICE O/P EST LOW 20 MIN: CPT | Performed by: NURSE PRACTITIONER

## 2020-01-11 PROCEDURE — 87880 STREP A ASSAY W/OPTIC: CPT | Performed by: NURSE PRACTITIONER

## 2020-01-11 PROCEDURE — 87081 CULTURE SCREEN ONLY: CPT | Performed by: NURSE PRACTITIONER

## 2020-01-11 ASSESSMENT — MIFFLIN-ST. JEOR: SCORE: 1281.02

## 2020-01-11 NOTE — PROGRESS NOTES
Subjective     Jenae Villeda is a 34 year old female who presents to clinic today for the following health issues:    HPI   Acute Illness   Acute illness concerns: sore throat  Onset: 3 days    Fever: no    Chills/Sweats: no but last night felt she was cold and hot     Headache (location?): YES - top of head     Sinus Pressure:no    Conjunctivitis:  no    Ear Pain: no    Rhinorrhea: no    Congestion: no    Sore Throat: YES     Cough: no    Wheeze: no    Decreased Appetite: no    Nausea: no    Vomiting: no    Diarrhea:  no    Dysuria/Freq.: no    Fatigue/Achiness: YES    Sick/Strep Exposure: no     Therapies Tried and outcome:  None       Patient Active Problem List   Diagnosis     Recurrent stomach ache     Mononucleosis     Irritable bowel syndrome with diarrhea- previously saw [facility] - ok for bentyl rx refill      CARDIOVASCULAR SCREENING; LDL GOAL LESS THAN 160     Angular cheilitis     Disorder of ear, left     Perspiration excessive     Dizziness     S/P  section x 2     Previous  delivery, antepartum     Status post  delivery     Past Surgical History:   Procedure Laterality Date      SECTION  2014    Procedure:  SECTION;  Surgeon: Izabela Saxena MD;  Location:  L+D      SECTION N/A 2016    Procedure:  SECTION;  Surgeon: Whitney Marshall MD;  Location:  L+D     GYN SURGERY       in  and      MASTOIDECTOMY Left 2017    Procedure: MASTOIDECTOMY;  Surgeon: Sridhar Tran MD;  Location:  OR       Social History     Tobacco Use     Smoking status: Never Smoker     Smokeless tobacco: Never Used   Substance Use Topics     Alcohol use: Yes     Alcohol/week: 0.0 standard drinks     Comment: 2 drinks/week     Family History   Problem Relation Age of Onset     Neurologic Disorder Mother         trouble with her feet on neurontin, back surgery     Hyperlipidemia Mother      Colon Polyps Mother         pre- cancerous  "    Hyperlipidemia Father      Breast Cancer Paternal Grandmother         postmenopausal - breast ca x 2 - in remission      Cancer Paternal Grandmother      Cerebrovascular Disease Paternal Grandmother      Gastrointestinal Disease Maternal Grandfather         had portion of colon removed     Cerebrovascular Disease Maternal Grandfather      Neurologic Disorder Maternal Grandfather         aneurysm     Cancer Maternal Grandfather         lung and pr-colon cancer     Colon Cancer Maternal Grandfather      Gastrointestinal Disease Other         celiac lupus, part of colon removed as well as gallbladder     Coronary Artery Disease Maternal Grandmother      Breast Cancer Maternal Aunt         postmenopausal - at age 61      Diabetes No family hx of          No current outpatient medications on file.     No Known Allergies  Recent Labs   Lab Test 18  0750 17  1441 13  1050 10/22/12  1114   LDL 76  --   --  77   HDL 61  --   --   --    TRIG 51  --   --   --    ALT 35 51* 44  --    CR 0.73 0.72 0.69  --    GFRESTIMATED >90 >90  Non  GFR Calc   >90  --    GFRESTBLACK >90 >90   GFR Calc   >90  --    POTASSIUM 4.0 3.7 3.9  --    TSH 2.48  --  4.64 2.36      BP Readings from Last 3 Encounters:   20 98/60   19 122/72   18 122/70    Wt Readings from Last 3 Encounters:   20 61.7 kg (136 lb)   19 62.8 kg (138 lb 6.4 oz)   18 60.8 kg (134 lb)            Reviewed and updated as needed this visit by Provider         Review of Systems   ROS COMP: Constitutional, HEENT, cardiovascular, pulmonary, gi and gu systems are negative, except as otherwise noted.      Objective    BP 98/60 (BP Location: Right arm, Cuff Size: Adult Regular)   Pulse 102   Temp 98.5  F (36.9  C) (Oral)   Ht 1.6 m (5' 3\")   Wt 61.7 kg (136 lb)   SpO2 99%   BMI 24.09 kg/m    Body mass index is 24.09 kg/m .  Physical Exam   GENERAL: healthy, alert and no distress  HENT: " ear canals and TM's normal, nose and mouth without ulcers or lesions  NECK: no adenopathy, no asymmetry, masses, or scars and thyroid normal to palpation  RESP: lungs clear to auscultation - no rales, rhonchi or wheezes  CV: regular rate and rhythm, normal S1 S2, no S3 or S4, no murmur, click or rub, no peripheral edema and peripheral pulses strong  PSYCH: mentation appears normal, affect normal/bright    Diagnostic Test Results:  Labs reviewed in Epic  Results for orders placed or performed in visit on 01/11/20 (from the past 24 hour(s))   Rapid strep screen   Result Value Ref Range    Specimen Description Throat     Rapid Strep A Screen       NEGATIVE: No Group A streptococcal antigen detected by immunoassay, await culture report.         Assessment & Plan     (J02.9) Sore throat  (primary encounter diagnosis)  Comment:   Plan: Rapid strep screen, Beta strep group A culture             See Patient Instructions  Likely viral, symptomatic treatment - await urine culture   Return to clinic if no improvement or symptoms worsen  Patient verbalized understanding & agreed with plan of care    SIGRID Moser, CNP  [facility]

## 2020-01-12 DIAGNOSIS — J02.0 STREPTOCOCCAL PHARYNGITIS: Primary | ICD-10-CM

## 2020-01-12 LAB
BACTERIA SPEC CULT: ABNORMAL
BACTERIA SPEC CULT: ABNORMAL
Lab: ABNORMAL
SPECIMEN SOURCE: ABNORMAL

## 2020-01-12 RX ORDER — AMOXICILLIN 500 MG/1
500 CAPSULE ORAL 2 TIMES DAILY
Qty: 10 CAPSULE | Refills: 0 | Status: SHIPPED | OUTPATIENT
Start: 2020-01-12 | End: 2020-02-01

## 2020-01-13 LAB
BACTERIA SPEC CULT: NORMAL
SPECIMEN SOURCE: NORMAL

## 2020-02-01 ENCOUNTER — OFFICE VISIT (OUTPATIENT)
Dept: FAMILY MEDICINE | Facility: CLINIC | Age: 35
End: 2020-02-01
Payer: COMMERCIAL

## 2020-02-01 VITALS
SYSTOLIC BLOOD PRESSURE: 104 MMHG | OXYGEN SATURATION: 98 % | WEIGHT: 134 LBS | HEIGHT: 63 IN | BODY MASS INDEX: 23.74 KG/M2 | HEART RATE: 88 BPM | TEMPERATURE: 97 F | DIASTOLIC BLOOD PRESSURE: 62 MMHG

## 2020-02-01 DIAGNOSIS — Z00.00 ROUTINE GENERAL MEDICAL EXAMINATION AT A HEALTH CARE FACILITY: Primary | ICD-10-CM

## 2020-02-01 DIAGNOSIS — Z13.6 CARDIOVASCULAR SCREENING; LDL GOAL LESS THAN 160: ICD-10-CM

## 2020-02-01 DIAGNOSIS — K58.0 IRRITABLE BOWEL SYNDROME WITH DIARRHEA: ICD-10-CM

## 2020-02-01 DIAGNOSIS — D22.9 MULTIPLE PIGMENTED NEVI: ICD-10-CM
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
## 2020-02-01 LAB
CHOLEST SERPL-MCNC: 158 MG/DL
ERYTHROCYTE [DISTWIDTH] IN BLOOD BY AUTOMATED COUNT: 12.4 % (ref 10–15)
HCT VFR BLD AUTO: 39.8 % (ref 35–47)
HDLC SERPL-MCNC: 74 MG/DL
HGB BLD-MCNC: 13 G/DL (ref 11.7–15.7)
LDLC SERPL CALC-MCNC: 75 MG/DL
MCH RBC QN AUTO: 28 PG (ref 26.5–33)
MCHC RBC AUTO-ENTMCNC: 32.7 G/DL (ref 31.5–36.5)
MCV RBC AUTO: 86 FL (ref 78–100)
NONHDLC SERPL-MCNC: 84 MG/DL
PLATELET # BLD AUTO: 150 10E9/L (ref 150–450)
RBC # BLD AUTO: 4.65 10E12/L (ref 3.8–5.2)
TRIGL SERPL-MCNC: 43 MG/DL
WBC # BLD AUTO: 4.5 10E9/L (ref 4–11)

## 2020-02-01 PROCEDURE — 85027 COMPLETE CBC AUTOMATED: CPT | Performed by: FAMILY MEDICINE

## 2020-02-01 PROCEDURE — 36415 COLL VENOUS BLD VENIPUNCTURE: CPT | Performed by: FAMILY MEDICINE

## 2020-02-01 PROCEDURE — 99395 PREV VISIT EST AGE 18-39: CPT | Performed by: FAMILY MEDICINE

## 2020-02-01 PROCEDURE — 80061 LIPID PANEL: CPT | Performed by: FAMILY MEDICINE

## 2020-02-01 RX ORDER — DICYCLOMINE HCL 20 MG
20 TABLET ORAL EVERY 6 HOURS
COMMUNITY
End: 2020-02-01

## 2020-02-01 RX ORDER — DICYCLOMINE HCL 20 MG
20 TABLET ORAL EVERY 6 HOURS
Qty: 30 TABLET | Refills: 11 | Status: SHIPPED | OUTPATIENT
Start: 2020-02-01 | End: 2021-02-03

## 2020-02-01 ASSESSMENT — MIFFLIN-ST. JEOR: SCORE: 1264.01

## 2020-02-01 NOTE — PROGRESS NOTES
SUBJECTIVE:   CC: Jenae Villeda is an 34 year old woman who presents for preventive health visit.     Healthy Habits:    Do you get at least three servings of calcium containing foods daily (dairy, green leafy vegetables, etc.)? Yes.    Amount of exercise or daily activities, outside of work: 3 day(s) per week.    Problems taking medications regularly No    Medication side effects: No    Have you had an eye exam in the past two years? yes    Do you see a dentist twice per year? yes    Do you have sleep apnea, excessive snoring or daytime drowsiness?no      IBS-D hasn't been bothering her as much since starting better nutrition a couple of years ago - limiting processed sugars and flours.      Today's PHQ-2 Score:   PHQ-2 ( 1999 [employer]) 1/24/2018 11/27/2017   Q1: Little interest or pleasure in doing things 0 0   Q2: Feeling down, depressed or hopeless 0 0   PHQ-2 Score 0 0       Abuse: Current or Past(Physical, Sexual or Emotional)- No.  Do you feel safe in your environment? Yes.        Social History     Tobacco Use     Smoking status: Never Smoker     Smokeless tobacco: Never Used   Substance Use Topics     Alcohol use: Yes     Alcohol/week: 0.0 standard drinks     Comment: 2 drinks/week     If you drink alcohol do you typically have >3 drinks per day or >7 drinks per week? No                     Reviewed orders with patient.  Reviewed health maintenance and updated orders accordingly - Yes  BP Readings from Last 3 Encounters:   02/01/20 104/62   01/11/20 98/60   12/02/19 122/72    Wt Readings from Last 3 Encounters:   02/01/20 60.8 kg (134 lb)   01/11/20 61.7 kg (136 lb)   12/02/19 62.8 kg (138 lb 6.4 oz)                  Patient Active Problem List   Diagnosis     Recurrent stomach ache     Mononucleosis     Irritable bowel syndrome with diarrhea- previously [facility] - ok for bentyl rx refill      CARDIOVASCULAR SCREENING; LDL GOAL LESS THAN 160     Angular cheilitis     Disorder of ear, left      Perspiration excessive     Dizziness     S/P  section x 2     Previous  delivery, antepartum     Status post  delivery     Past Surgical History:   Procedure Laterality Date      SECTION  2014    Procedure:  SECTION;  Surgeon: Izabela Saxena MD;  Location:  L+D      SECTION N/A 2016    Procedure:  SECTION;  Surgeon: Whitney Marshall MD;  Location:  L+D     GYN SURGERY       in  and      MASTOIDECTOMY Left 2017    Procedure: MASTOIDECTOMY;  Surgeon: Sridhar Tran MD;  Location:  OR       Social History     Tobacco Use     Smoking status: Never Smoker     Smokeless tobacco: Never Used   Substance Use Topics     Alcohol use: Yes     Alcohol/week: 0.0 standard drinks     Comment: 2 drinks/week     Family History   Problem Relation Age of Onset     Neurologic Disorder Mother         trouble with her feet on neurontin, back surgery     Hyperlipidemia Mother      Colon Polyps Mother         pre- cancerous     Hyperlipidemia Father      Breast Cancer Paternal Grandmother         postmenopausal - breast ca x 2 - in remission      Cancer Paternal Grandmother      Cerebrovascular Disease Paternal Grandmother      Gastrointestinal Disease Maternal Grandfather         had portion of colon removed     Cerebrovascular Disease Maternal Grandfather      Neurologic Disorder Maternal Grandfather         aneurysm     Cancer Maternal Grandfather         lung and pr-colon cancer     Colon Cancer Maternal Grandfather      Gastrointestinal Disease Other         celiac lupus, part of colon removed as well as gallbladder     Coronary Artery Disease Maternal Grandmother      Breast Cancer Maternal Aunt         postmenopausal - at age 61      Diabetes No family hx of          Current Outpatient Medications   Medication Sig Dispense Refill     dicyclomine (BENTYL) 20 MG tablet Take 1 tablet (20 mg) by mouth every 6 hours 30 tablet 11     No  Known Allergies  Recent Labs   Lab Test 18  0750 17  1441 13  1050 10/22/12  1114   LDL 76  --   --  77   HDL 61  --   --   --    TRIG 51  --   --   --    ALT 35 51* 44  --    CR 0.73 0.72 0.69  --    GFRESTIMATED >90 >90  Non  GFR Calc   >90  --    GFRESTBLACK >90 >90   GFR Calc   >90  --    POTASSIUM 4.0 3.7 3.9  --    TSH 2.48  --  4.64 2.36        Mammogram not appropriate for this patient based on age.    Pertinent mammograms are reviewed under the imaging tab.  History of abnormal Pap smear: NO - age 30- 65 PAP every 3 years recommended  PAP / HPV Latest Ref Rng & Units 2018 2014 10/22/2012   PAP - NIL - NIL   HPV 16 DNA NEG:Negative Negative - -   HPV 18 DNA NEG:Negative Negative - -   OTHER HR HPV NEG:Negative Negative - -   HPV-ABSTRA - - Negative -     Reviewed and updated as needed this visit by clinical staff  Tobacco  Allergies  Meds  Med Hx  Surg Hx  Fam Hx  Soc Hx        Reviewed and updated as needed this visit by Provider.        OB History    Para Term  AB Living   2 2 2 0 0 2   SAB TAB Ectopic Multiple Live Births   0 0 0 0 2      # Outcome Date GA Lbr Yazan/2nd Weight Sex Delivery Anes PTL Lv   2 Term 16 39w4d  3.439 kg (7 lb 9.3 oz) M CS-LTranv Spinal  MELY      Name: Quinton      Apgar1: 8  Apgar5: 9   1 Term 14 40w2d 11:20 / 02:45 3.68 kg (8 lb 1.8 oz) M CS-LTranv EPI  MELY      Birth Comments: Followed by [facility] and delivered by [facility]. admit @34 wks for PTL and got steroids. forebag sROM at 40+1. induced for 16 hrs then had AROM main bag. pushed 2 hrs. tried vac x2 but no descent. epidural not working well and lots of pain. c/s done-OP and C      Name: Lam      Apgar1: 9  Apgar5: 9       ROS:  CONSTITUTIONAL: NEGATIVE for fever, chills, change in weight  INTEGUMENTARU/SKIN: NEGATIVE for worrisome rashes, moles or lesions  EYES: NEGATIVE for vision changes or irritation  ENT: NEGATIVE for ear, mouth  "and throat problems  RESP: NEGATIVE for significant cough or SOB  BREAST: NEGATIVE for masses, tenderness or discharge  CV: NEGATIVE for chest pain, palpitations or peripheral edema  GI: NEGATIVE for nausea, abdominal pain, heartburn, or change in bowel habits  : NEGATIVE for unusual urinary or vaginal symptoms. Periods are regular.  MUSCULOSKELETAL: NEGATIVE for significant arthralgias or myalgia  NEURO: NEGATIVE for weakness, dizziness or paresthesias  ENDOCRINE: NEGATIVE for temperature intolerance, skin/hair changes  HEME/ALLERGY/IMMUNE: NEGATIVE for bleeding problems.   PSYCHIATRIC: NEGATIVE for changes in mood or affect.    OBJECTIVE:   /62   Pulse 88   Temp 97  F (36.1  C) (Tympanic)   Ht 1.588 m (5' 2.5\")   Wt 60.8 kg (134 lb)   LMP 02/01/2020   SpO2 98%   BMI 24.12 kg/m    EXAM:  GENERAL: healthy, alert and no distress.   EYES: Eyes grossly normal to inspection, PERRL and conjunctivae and sclerae normal  HENT: ear canals and TM's normal, nose and mouth without ulcers or lesions  NECK: no adenopathy, no asymmetry, masses, or scars and thyroid normal to palpation  RESP: lungs clear to auscultation - no rales, rhonchi or wheezes  BREAST: normal without masses, tenderness or nipple discharge and no palpable axillary masses or adenopathy  CV: regular rate and rhythm, normal S1 S2, no S3 or S4, no murmur, click or rub, no peripheral edema and peripheral pulses strong  ABDOMEN: soft, nontender, no hepatosplenomegaly, no masses and bowel sounds normal  MS: no gross musculoskeletal defects noted, no edema  SKIN: no suspicious lesions or rashes, but multiple small 1-2mm pigmented nevi - hx of signif. Tanning bed exposure as a teen and young adult per pt.   NEURO: Normal strength and tone, mentation intact and speech normal.  PSYCH: mentation appears normal, affect normal/bright.    Diagnostic Test Results:  Labs reviewed in Epic    ASSESSMENT/PLAN:       ICD-10-CM    1. Routine general medical " "examination at a health care facility Z00.00    2. Irritable bowel syndrome with diarrhea- [facility] - ok for bentyl rx refill  K58.0 dicyclomine (BENTYL) 20 MG tablet   3. CARDIOVASCULAR SCREENING; LDL GOAL LESS THAN 160 Z13.6 CBC with platelets     Lipid panel reflex to direct LDL Fasting   4. Multiple pigmented nevi D22.9 DERMATOLOGY REFERRAL   5. Exposure to tanning bed, subsequent encounter- many times as a teenager and young adult  W89.1XXD      Pap due next year.   Pt requested skin care referral for full body skin exam- given  hx of signif. Tanning bed exposure as a teen and young adult per pt.       COUNSELING:   Reviewed preventive health counseling, as reflected in patient instructions    Estimated body mass index is 24.09 kg/m  as calculated from the following:    Height as of 1/11/20: 1.6 m (5' 3\").    Weight as of 1/11/20: 61.7 kg (136 lb).     reports that she has never smoked. She has never used smokeless tobacco.      Counseling Resources:  ATP IV Guidelines  Pooled Cohorts Equation Calculator  Breast Cancer Risk Calculator  FRAX Risk Assessment  ICSI Preventive Guidelines  Dietary Guidelines for Americans, 2010  USDA's MyPlate  ASA Prophylaxis  Lung CA Screening    Jia Ramos MD  [facility] [place]  "

## 2020-02-01 NOTE — PATIENT INSTRUCTIONS
Preventive Health Recommendations  Female Ages 26 - 39  Yearly exam:   See your health care provider every year in order to    Review health changes.     Discuss preventive care.      Review your medicines if you your doctor has prescribed any.    Until age 30: Get a Pap test every three years (more often if you have had an abnormal result).    After age 30: Talk to your doctor about whether you should have a Pap test every 3 years or have a Pap test with HPV screening every 5 years.   You do not need a Pap test if your uterus was removed (hysterectomy) and you have not had cancer.  You should be tested each year for STDs (sexually transmitted diseases), if you're at risk.   Talk to your provider about how often to have your cholesterol checked.  If you are at risk for diabetes, you should have a diabetes test (fasting glucose).  Shots: Get a flu shot each year. Get a tetanus shot every 10 years.   Nutrition:     Eat at least 5 servings of fruits and vegetables each day.    Eat whole-grain bread, whole-wheat pasta and brown rice instead of white grains and rice.    Get adequate Calcium and Vitamin D.     Lifestyle    Exercise at least 150 minutes a week (30 minutes a day, 5 days of the week). This will help you control your weight and prevent disease.    Limit alcohol to one drink per day.    No smoking.     Wear sunscreen to prevent skin cancer.    See your dentist every six months for an exam and cleaning.    Thank you so much or choosing [facility]  for your Health Care. It was a pleasure seeing you at your visit today! Please contact us with any questions or concerns you may have.                   Jia Ramos MD                              To reach your [facility] care team after hours call:   [phone]    Our clinic hours are:     Monday- 7:30 am - 7:00 pm                             Tuesday through Friday- 7:30 am - 5:00 pm                                         Saturday- 8:00 am - 12:00 pm                  Phone:  [phone]    Our pharmacy hours are:     Monday  8:00 am to 7:00 pm      Tuesday through Friday 8:00am to 6:00pm                        Saturday - 9:00 am to 1:00 pm      Sunday : Closed.              Phone:  [phone]      There is also information available at our web site:  [email or web address]    If your provider ordered any lab tests and you do not receive the results within 10 business days, please call the clinic.    If you need a medication refill please contact your pharmacy.  Please allow 2 business days for your refill to be completed.    Our clinic offers telephone visits and e visits.  Please ask one of your team members to explain more.      Use [facility] (secure email communication and access to your chart) to send your primary care provider a message or make an appointment. Ask someone on your Team how to sign up for [facility].

## 2020-04-10 ENCOUNTER — OFFICE VISIT (OUTPATIENT)
Dept: URGENT CARE | Facility: URGENT CARE | Age: 35
End: 2020-04-10
Payer: COMMERCIAL

## 2020-04-10 ENCOUNTER — VIRTUAL VISIT (OUTPATIENT)
Dept: FAMILY MEDICINE | Facility: OTHER | Age: 35
End: 2020-04-10

## 2020-04-10 VITALS
HEART RATE: 78 BPM | TEMPERATURE: 98.6 F | DIASTOLIC BLOOD PRESSURE: 74 MMHG | BODY MASS INDEX: 24.12 KG/M2 | OXYGEN SATURATION: 97 % | SYSTOLIC BLOOD PRESSURE: 112 MMHG | WEIGHT: 134 LBS | RESPIRATION RATE: 14 BRPM

## 2020-04-10 DIAGNOSIS — H69.92 DYSFUNCTION OF LEFT EUSTACHIAN TUBE: Primary | ICD-10-CM

## 2020-04-10 DIAGNOSIS — Z86.69 HISTORY OF MASTOIDITIS: ICD-10-CM

## 2020-04-10 PROCEDURE — 99213 OFFICE O/P EST LOW 20 MIN: CPT | Performed by: FAMILY MEDICINE

## 2020-04-10 RX ORDER — GUAIFENESIN 600 MG/1
600-1200 TABLET, EXTENDED RELEASE ORAL 2 TIMES DAILY
Qty: 40 TABLET | Refills: 0 | Status: SHIPPED | OUTPATIENT
Start: 2020-04-10 | End: 2021-02-03

## 2020-04-10 RX ORDER — FLUTICASONE PROPIONATE 50 MCG
1-2 SPRAY, SUSPENSION (ML) NASAL DAILY
Qty: 16 G | Refills: 0 | Status: SHIPPED | OUTPATIENT
Start: 2020-04-10 | End: 2020-05-03

## 2020-04-10 RX ORDER — AMOXICILLIN 875 MG
875 TABLET ORAL 2 TIMES DAILY
Qty: 20 TABLET | Refills: 0 | Status: SHIPPED | OUTPATIENT
Start: 2020-04-10 | End: 2020-04-20

## 2020-04-10 NOTE — PROGRESS NOTES
SUBJECTIVE:  Chief Complaint   Patient presents with     Urgent Care     Otalgia     left ear pain       Jenae Villeda is a 34 year old female who presents with left ear pain, fullness and watery discharge for 2 day(s).   Severity: moderate   Timing:gradual onset, still present and constant  Additional symptoms include nasal congestion.      History of recurrent otitis: yes-  She has had past mastoid surgery on the left due to recurrent infections.  She continues to have frequent left ear infections    Past Medical History:   Diagnosis Date     Diarrhea      GI problem      Headache      Heartburn      IBS (irritable bowel syndrome)      Mononucleosis     Totally recovered     Nasal congestion      Oligomenorrhea      Sore throat      Patient Active Problem List   Diagnosis     Recurrent stomach ache     Mononucleosis     Irritable bowel syndrome with diarrhea- previously [facility] - ok for bentyl rx refill      CARDIOVASCULAR SCREENING; LDL GOAL LESS THAN 160     Angular cheilitis     Disorder of ear, left     Perspiration excessive     Dizziness     S/P  section x 2     Previous  delivery, antepartum     Status post  delivery     Exposure to tanning bed, subsequent encounter- many times as a teenager and young adult      Multiple pigmented nevi       ALLERGIES:  Patient has no known allergies.    MEDs  dicyclomine (BENTYL) 20 MG tablet, Take 1 tablet (20 mg) by mouth every 6 hours    No current facility-administered medications on file prior to visit.       Social History     Tobacco Use     Smoking status: Never Smoker     Smokeless tobacco: Never Used   Substance Use Topics     Alcohol use: Yes     Alcohol/week: 0.0 standard drinks     Comment: 2 drinks/week       Family History   Problem Relation Age of Onset     Neurologic Disorder Mother         trouble with her feet on neurontin, back surgery     Hyperlipidemia Mother      Colon Polyps Mother         pre- cancerous      Hyperlipidemia Father      Breast Cancer Paternal Grandmother         postmenopausal - breast ca x 2 - in remission      Cancer Paternal Grandmother      Cerebrovascular Disease Paternal Grandmother      Gastrointestinal Disease Maternal Grandfather         had portion of colon removed     Cerebrovascular Disease Maternal Grandfather      Neurologic Disorder Maternal Grandfather         aneurysm     Cancer Maternal Grandfather         lung and pr-colon cancer     Colon Cancer Maternal Grandfather      Gastrointestinal Disease Other         celiac lupus, part of colon removed as well as gallbladder     Coronary Artery Disease Maternal Grandmother      Breast Cancer Maternal Aunt         postmenopausal - at age 61      Diabetes No family hx of          ROS:   CONSTITUTIONAL:NEGATIVE for fever, chills,   INTEGUMENTARY/SKIN: NEGATIVE for worrisome rashes,  or lesions  EYES: NEGATIVE for vision changes or irritation  GI: NEGATIVE for nausea, abdominal pain,  , or change in bowel habits    OBJECTIVE:  /74   Pulse 78   Temp 98.6  F (37  C) (Oral)   Resp 14   Wt 60.8 kg (134 lb)   SpO2 97%   BMI 24.12 kg/m     EXAM:  The right TM is normal: no effusions, no erythema, and normal landmarks     The right auditory canal is normal and without drainage, edema or erythema  The left TM is normal: no effusions, no erythema, and normal landmarks  The left auditory canal is normal and without drainage, edema or erythema  Oropharynx exam is normal: no lesions, erythema, adenopathy or exudate.  GENERAL: no acute distress  EYES: EOMI,  PERRL, conjunctiva clear  NECK: supple, non-tender to palpation, no adenopathy noted  RESP: lungs clear to auscultation - no rales, rhonchi or wheezes  CV: regular rates and rhythm, normal S1 S2, no murmur noted  SKIN: no suspicious lesions or rashes     ASSESSMENT/ PLAN  Dysfunction of left eustachian tube     - fluticasone (FLONASE) 50 MCG/ACT nasal spray; Spray 1-2 sprays into both  nostrils daily  - guaiFENesin (MUCINEX) 600 MG 12 hr tablet; Take 1-2 tablets (600-1,200 mg) by mouth 2 times daily     Symptomatic relief of pain and fever with acetaminophen and/or ibuprofen   May apply a warm pack to the region of the ear for symptomatic relief       We discussed the primary importance of home cares to promote drainage and ventilation of the sinuses to decrease symptoms of  Ear/ sinus pressure    I encouraged the use of saline nasal spray as needed to promote cleaning of the nasal passages and to promote drainage of the sinuses and eustachian tubes.   Expectorants are recommended rather than decongestants to help promote eustachian tube drainage.     Follow up with primary clinic if not improving    History of mastoiditis     - amoxicillin (AMOXIL) 875 MG tablet; Take 1 tablet (875 mg) by mouth 2 times daily for 10 days  Due to her history of mastoiditis and recurrent otitis media, she was given Rx of amoxicillin that she may start if her symptoms are worsening in the future

## 2020-04-10 NOTE — PATIENT INSTRUCTIONS
Patient Education     Earache, No Infection (Adult)  Earaches can happen without an infection. This occurs when air and fluid build up behind the eardrum causing a feeling of fullness and discomfort and reduced hearing. This is called otitis media with effusion (OME) or serous otitis media. It means there is fluid in the middle ear. It is not the same as acute otitis media, which is typically from infection.  OME can happen when you have a cold if congestion blocks the passage that drains the middle ear. This passage is called the eustachian tube. OME may also occur with nasal allergies or after a bacterial middle ear infection.    The pain or discomfort may come and go. You may hear clicking or popping sounds when you chew or swallow. You may feel that your balance is off. Or you may hear ringing in the ear.  It often takes from several weeks up to 3 months for the fluid to clear on its own. Oral pain relievers and ear drops help if there is pain. Decongestants and antihistamines sometimes help. Antibiotics don't help since there is no infection. Your doctor may prescribe a nasal spray to help reduce swelling in the nose and eustachian tube. This can allow the ear to drain.  If your OME doesn't improve after 3 months, surgery may be used to drain the fluid and insert a small tube in the eardrum to allow continued drainage.  Because the middle ear fluid can become infected, it is important to watch for signs of an ear infection which may develop later. These signs include increased ear pain, fever, or drainage from the ear.  Home care  The following guidelines will help you care for yourself at home:    You may use over-the-counter medicine as directed to control pain, unless another medicine was prescribed. If you have chronic liver or kidney disease or ever had a stomach ulcer or GI bleeding, talk with your doctor before using these medicines. Aspirin should never be used in anyone under 18 years of age who is  ill with a fever. It may cause severe liver damage.    You may use over-the-counter decongestants such as phenylephrine or pseudoephedrine. But they are not always helpful. Don't use nasal spray decongestants more than 3 days. Longer use can make congestion worse. Prescription nasal sprays from your doctor don't typically have those restrictions.    Antihistamines may help if you are also having allergy symptoms.    You may use medicines such as guaifenesin to thin mucus and promote drainage.  Follow-up care  Follow up with your healthcare provider or as advised if you are not feeling better after 3 days.  When to seek medical advice  Call your healthcare provider right away if any of the following occur:    Your ear pain gets worse or does not start to improve     Fever of 100.4 F (38 C) or higher, or as directed by your healthcare provider    Fluid or blood draining from the ear    Headache or sinus pain    Stiff neck    Unusual drowsiness or confusion  Date Last Reviewed: 10/1/2016    8681-8230 [employer]. [address] [place] [address], [place] [address]. All rights reserved. This information is not intended as a substitute for professional medical care. Always follow your healthcare professional's instructions.

## 2020-04-10 NOTE — PROGRESS NOTES
"Date: 04/10/2020 13:57:41  Clinician: Carol Remy  Clinician NPI: [id number]  Patient: Jenae Villeda  Patient : 1985  Patient Address: [address] [place] [address]  Patient Phone: [phone]  Visit Protocol: Ear pain  Patient Summary:  Jenae is a 34 year old ( : 1985 ) female who initiated a Visit for swimmer's ear (ear pain). When asked the question \"Please sign me up to receive news, health information and promotions from [facility].\", Jenae responded \"No\".    Jenae reports that her ear pain started 5-7 days ago. The ear pain is located inside the left ear.   In addition to the ear pain, Jenae is experiencing a feeling of fullness in the ear(s).   Symptom Details   Pain: Jenae is experiencing mild pain (1-3 on a 10 point pain scale). It does not get worse when she gently pulls on the earlobe(s) and eats or chews.    Jenae denies tenderness, redness, and itchiness in the ear(s). She also denies recent injuries near the ear(s), having fluid draining from the ear(s), feeling feverish, ever having ear tubes, and the possibility of a foreign object in the ear(s).   Precipitating events   Jenae denies swimming and flying within the past week.   Pertinent medical history   Weight: 132 lbs   She denies pregnancy and denies breastfeeding. She is currently menstruating.   She does not smoke or use smokeless tobacco.   Additional health information pertinent to this Visit as reported by the patient (free text): left ear feels wet inside, having headaches. Have had history of left ear infections in last few months.   Weight: 132 lbs    MEDICATIONS: No current medications, ALLERGIES: NKDA  Clinician Response:  Dear Jenae,    Based on the information you provide, evaluation in urgent care is recommended. Please go to the nearest [place] urgent care.      Diagnosis: Swimmer's ear, left ear  Diagnosis ICD: H60.332  "

## 2020-04-17 ENCOUNTER — MYC MEDICAL ADVICE (OUTPATIENT)
Dept: FAMILY MEDICINE | Facility: CLINIC | Age: 35
End: 2020-04-17

## 2020-04-17 DIAGNOSIS — Z11.1 SCREENING EXAMINATION FOR PULMONARY TUBERCULOSIS: Primary | ICD-10-CM

## 2020-04-20 DIAGNOSIS — Z11.1 SCREENING EXAMINATION FOR PULMONARY TUBERCULOSIS: ICD-10-CM

## 2020-04-20 PROCEDURE — 86481 TB AG RESPONSE T-CELL SUSP: CPT | Performed by: FAMILY MEDICINE

## 2020-04-20 NOTE — TELEPHONE ENCOUNTER
TB Gold test ordered.    Patient notified via [employer].      MARSHAL Dallas, RN, PHN  [facility]  Office: [phone]  Fax: [phone]

## 2020-04-20 NOTE — NURSING NOTE
Chief Complaint   Patient presents with     Blood Draw     VPT blood draw      Blood drawn from right arm

## 2020-04-21 LAB
GAMMA INTERFERON BACKGROUND BLD IA-ACNC: 0.05 IU/ML
M TB IFN-G BLD-IMP: NEGATIVE
M TB IFN-G CD4+ BCKGRND COR BLD-ACNC: 7.53 IU/ML
MITOGEN IGNF BCKGRD COR BLD-ACNC: 0 IU/ML
MITOGEN IGNF BCKGRD COR BLD-ACNC: 0 IU/ML

## 2020-05-02 DIAGNOSIS — H69.92 DYSFUNCTION OF LEFT EUSTACHIAN TUBE: ICD-10-CM

## 2020-05-03 RX ORDER — FLUTICASONE PROPIONATE 50 MCG
1-2 SPRAY, SUSPENSION (ML) NASAL DAILY
Qty: 16 ML | Refills: 0 | Status: SHIPPED | OUTPATIENT
Start: 2020-05-03 | End: 2021-02-03

## 2020-05-04 ENCOUNTER — TELEPHONE (OUTPATIENT)
Dept: FAMILY MEDICINE | Facility: CLINIC | Age: 35
End: 2020-05-04

## 2020-05-04 DIAGNOSIS — Z01.84 IMMUNITY STATUS TESTING: Primary | ICD-10-CM

## 2020-05-05 NOTE — TELEPHONE ENCOUNTER
Received form re: immunity status and immunizations. Looks like pt needs varicella yelena testing prior to me being able to sign form.     Orders futured. Please inform patient. Needs lab only appt. Please assist pt in making appt for that.      Forms at [facility] nurse's station desk.

## 2020-05-05 NOTE — TELEPHONE ENCOUNTER
Patient ID: Erendira Nolasco is a 45 [facility] ) - Primary    Droopy eyelid, right    Relevant Orders    Acetylcholine receptor, binding    Acetylcholine receptor, blocking    Acetylcholine receptor, modulating             Subjective:    HPI    This is a 46 y/o Female who is here as a left Spoke with pt - made appt.    Tiffany Zhou CMA.   

## 2020-05-12 DIAGNOSIS — Z01.84 IMMUNITY STATUS TESTING: ICD-10-CM

## 2020-05-12 PROCEDURE — 36415 COLL VENOUS BLD VENIPUNCTURE: CPT | Performed by: FAMILY MEDICINE

## 2020-05-12 PROCEDURE — 86787 VARICELLA-ZOSTER ANTIBODY: CPT | Performed by: FAMILY MEDICINE

## 2020-05-13 ENCOUNTER — TELEPHONE (OUTPATIENT)
Dept: FAMILY MEDICINE | Facility: CLINIC | Age: 35
End: 2020-05-13

## 2020-05-13 LAB — VZV IGG SER QL IA: >8 AI (ref 0–0.8)

## 2020-05-13 NOTE — RESULT ENCOUNTER NOTE
Dear Jenae,    Your Varicella lab work is normal/expected- Positive, suggests prev. exposure and probable immunity.    Healthy regards,    Nikole Carrillo, DENIZ  [facility] (covering for Dr. Rmaos)

## 2020-05-13 NOTE — TELEPHONE ENCOUNTER
Talked to patient - her immunization records don't show all her childhood immunizations.  Only Tdap and Influenza.  Pt stated she has a record of all other requirements for this form and will fax it tomorrow    Form given to LULU Davis to watch for records.  Pt ok with waiting till JV back next week for signature.

## 2020-05-13 NOTE — TELEPHONE ENCOUNTER
Reason for Call:  Form, our goal is to have forms completed with 72 hours, however, some forms may require a visit or additional information.    Type of letter, form or note:  [facility]     Who is the form from?: Patient    Where did the form come from: Patient or family brought in       What clinic location was the form placed at?: [place]    Where the form was placed: Given to physician    What number is listed as a contact on the form?: [phone]       Additional comments: Patient will  once completed    Call taken on 5/13/2020 at 12:27 PM by Armida Yeung

## 2020-05-19 NOTE — TELEPHONE ENCOUNTER
Immunizations faxed to us, entered into her chart by MARCO Griffiths,  Form in Dr Ramos in basket to complete.       Susan Yeung

## 2020-05-29 DIAGNOSIS — H69.92 DYSFUNCTION OF LEFT EUSTACHIAN TUBE: ICD-10-CM

## 2020-06-02 ENCOUNTER — MYC MEDICAL ADVICE (OUTPATIENT)
Dept: FAMILY MEDICINE | Facility: CLINIC | Age: 35
End: 2020-06-02

## 2020-06-03 RX ORDER — FLUTICASONE PROPIONATE 50 MCG
1-2 SPRAY, SUSPENSION (ML) NASAL DAILY
Qty: 16 ML | Refills: 0 | OUTPATIENT
Start: 2020-06-03

## 2020-06-03 NOTE — TELEPHONE ENCOUNTER
My chart message sent     Please see forms encounter from 5/13/2020    Thank you     Jenae Jimenes RN, BSN  [place] [facility]

## 2020-06-08 NOTE — TELEPHONE ENCOUNTER
Forms, immunization history and Quantiferon levels printed and at   For patient to  as requested.   Called and left a message to let patient know they are ready.     Copy sent to be scanned.

## 2020-07-07 ENCOUNTER — VIRTUAL VISIT (OUTPATIENT)
Dept: FAMILY MEDICINE | Facility: OTHER | Age: 35
End: 2020-07-07

## 2020-07-07 NOTE — PROGRESS NOTES
"Date: 2020 12:21:00  Clinician: Elton Betts  Clinician NPI: [id number]  Patient: Jenae Villeda  Patient : 1985  Patient Address: [address] [place] [address]  Patient Phone: [phone]  Visit Protocol: URI  Patient Summary:  Jenae is a 34 year old ( : 1985 ) female who initiated a Visit for COVID-19 (Coronavirus) evaluation and screening. When asked the question \"Please sign me up to receive news, health information and promotions from [employer].\", Jenae responded \"No\".    Jenae states her symptoms started 1-2 days ago.   Her symptoms consist of malaise, a headache, a sore throat, and nasal congestion.   Symptom details     Nasal secretions: The color of her mucus is clear.    Sore throat: Jenae reports having mild throat pain (1-3 on a 10 point pain scale), does not have exudate on her tonsils, and can swallow liquids. She is not sure if the lymph nodes in her neck are enlarged. A rash has not appeared on the skin since the sore throat started.     Headache: She states the headache is mild (1-3 on a 10 point pain scale).      Jenae denies having wheezing, nausea, teeth pain, ageusia, diarrhea, vomiting, rhinitis, ear pain, chills, myalgias, anosmia, facial pain or pressure, fever, and cough. She also denies having recent facial or sinus surgery in the past 60 days and taking antibiotic medication in the past month. She is not experiencing dyspnea.   Precipitating events  Within the past week, Jenae has not been exposed to someone with strep throat.   Pertinent COVID-19 (Coronavirus) information  In the past 14 days, Jenae has not worked in a congregate living setting.   She either works or volunteers as a healthcare worker or a , or works or volunteers in a healthcare facility. 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 She does not live with a healthcare worker.   Jenae has not had a close contact with a laboratory-confirmed COVID-19 patient within 14 days of symptom onset.   Pertinent medical history  Jenae does not get yeast infections when she takes antibiotics.   Jenae does not need a return to work/school note.   Weight: 133 lbs   Jenae does not smoke or use smokeless tobacco.   She denies pregnancy and denies breastfeeding. She has menstruated in the past month.   Weight: 133 lbs    MEDICATIONS: No current medications, ALLERGIES: NKDA  Clinician Response:  Dear Jenae,   Your symptoms show that you may have coronavirus (COVID-19). This illness can cause fever, cough and trouble breathing. Many people get a mild case and get better on their own. Some people can get very sick.  What should I do?  We would like to test you for this virus.   1. Please call [phone] to schedule your visit. Explain that [facility] COVID-19 test. Be ready to share your [facility] visit ID number.  The following will serve as your written order for this COVID Test, ordered by me, for the indication of suspected COVID [[id number]]: The test will be ordered in [facility], our electronic health record, after you are scheduled. It will show as ordered and authorized by Harshil Womack MD.  Order: COVID-19 (Coronavirus) PCR for SYMPTOMATIC testing from [facility].      2. When it's time for your COVID test:  Stay at least 6 feet away from others. (If someone will drive you to your test, stay in the backseat, as far away from the  as you can.)   Cover your mouth and nose with a mask, tissue or washcloth.  Go straight to the testing site. Don't make any stops on the way there or back.      3.Starting now: Stay home and away from others (self-isolate) until:   You've had no fever---and no medicine that reduces fever---for 3 full days  "(72 hours). And...   Your other symptoms have gotten better. For example, your cough or breathing has improved. And...   At least 10 days have passed since your symptoms started.       During this time, don't leave the house except for testing or medical care.   Stay in your own room, even for meals. Use your own bathroom if you can.   Stay away from others in your home. No hugging, kissing or shaking hands. No visitors.  Don't go to work, school or anywhere else.    Clean \"high touch\" surfaces often (doorknobs, counters, handles, etc.). Use a household cleaning spray or wipes. You'll find a full list of  on the EPA website: www.epa.gov/pesticide-registration/list-n-disinfectants-use-against-sars-cov-2.   Cover your mouth and nose with a mask, tissue or washcloth to avoid spreading germs.  Wash your hands and face often. Use soap and water.  Caregivers in these groups are at risk for severe illness due to COVID-19:  o People 65 years and older  o People who live in a nursing home or long-term care facility  o People with chronic disease (lung, heart, cancer, diabetes, kidney, liver, immunologic)  o People who have a weakened immune system, including those who:   Are in cancer treatment  Take medicine that weakens the immune system, such as corticosteroids  Had a bone marrow or organ transplant  Have an immune deficiency  Have poorly controlled HIV or AIDS  Are obese (body mass index of 40 or higher)  Smoke regularly   o Caregivers should wear gloves while washing dishes, handling laundry and cleaning bedrooms and bathrooms.  o Use caution when washing and drying laundry: Don't shake dirty laundry, and use the warmest water setting that you can.  o For more tips, go to www.cdc.gov/coronavirus/2019-ncov/downloads/10Things.pdf.    4.Sign up for [employer]. We know it's scary to hear that you might have COVID-19. We want to track your symptoms to make sure you're okay over the next 2 weeks. Please look for an " email from [employer]---this is a free, online program that we'll use to keep in touch. To sign up, follow the link in the email. Learn more at [email or web address]  How can I take care of myself?   Get lots of rest. Drink extra fluids (unless a doctor has told you not to).   Take Tylenol (acetaminophen) for fever or pain. If you have liver or kidney problems, ask your family doctor if it's okay to take Tylenol.   Adults can take either:    650 mg (two 325 mg pills) every 4 to 6 hours, or...   1,000 mg (two 500 mg pills) every 8 hours as needed.    Note: Don't take more than 3,000 mg in one day. Acetaminophen is found in many medicines (both prescribed and over-the-counter medicines). Read all labels to be sure you don't take too much.   For children, check the Tylenol bottle for the right dose. The dose is based on the child's age or weight.    If you have other health problems (like cancer, heart failure, an organ transplant or severe kidney disease): Call your specialty clinic if you don't feel better in the next 2 days.       Know when to call 911. Emergency warning signs include:    Trouble breathing or shortness of breath Pain or pressure in the chest that doesn't go away Feeling confused like you haven't felt before, or not being able to wake up Bluish-colored lips or face.  Where can I get more information?   [facility] -- About COVID-19: [email or web address]   CDC -- What to Do If You're Sick: www.cdc.gov/coronavirus/2019-ncov/about/steps-when-sick.html   CDC -- Ending Home Isolation: www.cdc.gov/coronavirus/2019-ncov/hcp/disposition-in-home-patients.html   CDC -- Caring for Someone: www.cdc.gov/coronavirus/2019-ncov/if-you-are-sick/care-for-someone.html   [facility] -- Interim Guidance for Hospital Discharge to Home: www.health.[facility]ending [facility] to [facility].mn.us/diseases/coronavirus/hcp/hospdischarge.pdf   [facility] clinical trials (COVID-19 research studies):  clinicalaffairs.[facility]-clinical-trials    Below are the COVID-19 hotlines at the [place] Department of Health ([facility]). Interpreters are available.    For health questions: Call 058-400-9997 or 1-436.397.4810 (7 a.m. to 7 p.m.) For questions about schools and childcare: Call 790-419-9429 or 1-112.117.6726 (7 a.m. to 7 p.m.)    Diagnosis: Other malaise  Diagnosis ICD: R53.81

## 2020-07-08 DIAGNOSIS — Z20.822 SUSPECTED COVID-19 VIRUS INFECTION: Primary | ICD-10-CM

## 2020-07-08 PROCEDURE — U0003 INFECTIOUS AGENT DETECTION BY NUCLEIC ACID (DNA OR RNA); SEVERE ACUTE RESPIRATORY SYNDROME CORONAVIRUS 2 (SARS-COV-2) (CORONAVIRUS DISEASE [COVID-19]), AMPLIFIED PROBE TECHNIQUE, MAKING USE OF HIGH THROUGHPUT TECHNOLOGIES AS DESCRIBED BY CMS-2020-01-R: HCPCS | Performed by: FAMILY MEDICINE

## 2020-07-08 NOTE — LETTER
July 12, 2020        Jenae Villeda  [address]  [facility] [address]    This letter provides a written record that you were tested for COVID-19 on 7/9/20.       Your result was negative. This means that we didn t find the virus that causes COVID-19 in your sample. A test may show negative when you do actually have the virus. This can happen when the virus is in the early stages of infection, before you feel illness symptoms.    If you have symptoms   Stay home and away from others (self-isolate) until you meet ALL of the guidelines below:    You ve had no fever--and no medicine that reduces fever--for 3 full days (72 hours). And      Your other symptoms have gotten better. For example, your cough or breathing has improved. And     At least 10 days have passed since your symptoms started.    During this time:    Stay home. Don t go to work, school or anywhere else.     Stay in your own room, including for meals. Use your own bathroom if you can.    Stay away from others in your home. No hugging, kissing or shaking hands. No visitors.    Clean  high touch  surfaces often (doorknobs, counters, handles, etc.). Use a household cleaning spray or wipes. You can find a full list on the EPA website at www.epa.gov/pesticide-registration/list-n-disinfectants-use-against-sars-cov-2.    Cover your mouth and nose with a mask, tissue or washcloth to avoid spreading germs.    Wash your hands and face often with soap and water.    Going back to work  Check with your employer for any guidelines to follow for going back to work.    Employers: This document serves as formal notice that your employee tested negative for COVID-19, as of the testing date shown above.

## 2020-07-09 LAB
SARS-COV-2 RNA SPEC QL NAA+PROBE: NOT DETECTED
SPECIMEN SOURCE: NORMAL

## 2020-08-25 ENCOUNTER — OFFICE VISIT (OUTPATIENT)
Dept: URGENT CARE | Facility: URGENT CARE | Age: 35
End: 2020-08-25
Payer: COMMERCIAL

## 2020-08-25 ENCOUNTER — RESULTS ONLY (OUTPATIENT)
Dept: LAB | Age: 35
End: 2020-08-25

## 2020-08-25 DIAGNOSIS — Z53.9 ERRONEOUS ENCOUNTER--DISREGARD: Primary | ICD-10-CM

## 2020-08-26 LAB
SARS-COV-2 RNA SPEC QL NAA+PROBE: NOT DETECTED
SPECIMEN SOURCE: NORMAL

## 2020-11-17 ENCOUNTER — E-VISIT (OUTPATIENT)
Dept: URGENT CARE | Facility: URGENT CARE | Age: 35
End: 2020-11-17
Payer: COMMERCIAL

## 2020-11-17 DIAGNOSIS — Z20.822 SUSPECTED COVID-19 VIRUS INFECTION: Primary | ICD-10-CM

## 2020-11-17 DIAGNOSIS — Z20.822 SUSPECTED COVID-19 VIRUS INFECTION: ICD-10-CM

## 2020-11-17 PROCEDURE — 99207 PR NO BILLABLE SERVICE THIS VISIT: CPT | Performed by: PHYSICIAN ASSISTANT

## 2020-11-17 PROCEDURE — U0003 INFECTIOUS AGENT DETECTION BY NUCLEIC ACID (DNA OR RNA); SEVERE ACUTE RESPIRATORY SYNDROME CORONAVIRUS 2 (SARS-COV-2) (CORONAVIRUS DISEASE [COVID-19]), AMPLIFIED PROBE TECHNIQUE, MAKING USE OF HIGH THROUGHPUT TECHNOLOGIES AS DESCRIBED BY CMS-2020-01-R: HCPCS | Performed by: PHYSICIAN ASSISTANT

## 2020-11-17 NOTE — PATIENT INSTRUCTIONS
Dear Jenae Villeda,    Your symptoms show that you may have coronavirus (COVID-19). This illness can cause fever, cough and trouble breathing. Many people get a mild case and get better on their own. Some people can get very sick.    Will I be tested for COVID-19?  We would like to test you for Covid-19 virus. I have placed orders for this test.     For all employees or close contacts (except [employer] and [employer] - see below), go to your [employer] home page and scroll down to the section that says  You have an appointment that needs to be scheduled  and click the large green button that says  Schedule Now  and follow the steps to find the next available opening.     If you are unable to complete these steps or if you cannot find any available times, please call [phone] to schedule employee testing.       [employer] employees or close contacts, please call [phone].   [employer]) employees or close contacts call [phone].    When it's time for your COVID test:  Stay at least 6 feet away from others. (If someone will drive you to your test, stay in the backseat, as far away from the  as you can.)  Cover your mouth and nose with a mask, tissue or washcloth.  Go straight to the testing site. Don't make any stops on the way there or back.    Starting now:     Do not go to work.   o If you receive a negative COVID-19 test result and were NOT exposed to someone with a known positive COVID-19 test, you can return to work once you're free of fever for 24 hours without fever-reducing medication and your symptoms are improving or resolved.  o If you receive a positive COVID-19 test result, you must be cleared by Employee Occupational Health and Safety to return to work.   o If you were exposed to someone who has tested positive for COVID-19, you can return to work 14 days after your last contact with the positive individual, provided you do not have symptoms at all during that time. In some  "cases, your manager may ask you to come back sooner than 14 days.     During this time, don't leave the house except for testing or medical care.  o Stay in your own room, even for meals. Use your own bathroom if you can.  o Stay away from others in your home. No hugging, kissing or shaking hands. No visitors.  o Don't go to work, school or anywhere else.    Clean \"high touch\" surfaces often (doorknobs, counters, handles, etc.). Use a household cleaning spray or wipes. You'll find a full list of  on the EPA website: www.epa.gov/pesticide-registration/list-n-disinfectants-use-against-sars-cov-2.    Cover your mouth and nose with a mask, tissue or washcloth to avoid spreading germs.    Wash your hands and face often. Use soap and water.    People in these groups are at risk for severe illness due to COVID-19:  o People 65 years and older  o People who live in a nursing home or long-term care facility  o People with chronic disease (lung, heart, cancer, diabetes, kidney, liver, immunologic)  o People who have a weakened immune system, including those who:  - Are in cancer treatment  - Take medicine that weakens the immune system, such as corticosteroids  - Had a bone marrow or organ transplant  - Have an immune deficiency  - Have poorly controlled HIV or AIDS  - Are obese (body mass index of 40 or higher)  - Smoke regularly      Caregivers should wear gloves while washing dishes, handling laundry and cleaning bedrooms and bathrooms.    Use caution when washing and drying laundry: Don't shake dirty laundry, and use the warmest water setting that you can.    For more tips, go to www.cdc.gov/coronavirus/2019-ncov/downloads/10Things.pdf.    Sign up for [employer]. We know it's scary to hear that you might have COVID-19. We want to track your symptoms to make sure you're okay over the next 2 weeks. Please look for an email from [employer]--this is a free, online program that we'll use to keep in touch. To sign " up, follow the link in the email you will receive. Learn more at [email or web address]    How can I take care of myself?    Get lots of rest. Drink extra fluids (unless a doctor has told you not to)    Take Tylenol (acetaminophen) for fever or pain. If you have liver or kidney problems, ask your family doctor if it's okay to take Tylenol.  Adults can take either:    650 mg (two 325 mg pills) every 4 to 6 hours, or     1,000 mg (two 500 mg pills) every 8 hours as needed.    Note: Don't take more than 3,000 mg in one day. Acetaminophen is found in many medicines (both prescribed and over-the-counter medicines). Read all labels to be sure you don't take too much.  For children, check the Tylenol bottle for the right dose. The dose is based on the child's age or weight.    If you have other health problems (like cancer, heart failure, an organ transplant or severe kidney disease): Call your specialty clinic if you don't feel better in the next 2 days.    Know when to call 911. Emergency warning signs include:  Trouble breathing or shortness of breath  Pain or pressure in the chest that doesn't go away  Feeling confused like you haven't felt before, or not being able to wake up  Bluish-colored lips or face    Where can I get more information?    [facility] - About COVID-19: [email or web address]  CDC - What to Do If You're Sick: www.cdc.gov/coronavirus/2019-ncov/about/steps-when-sick.html  November 17, 2020    RE:  Jenae Villeda                                                                                                                                                       [id number] [facility] [id number]        To whom it may concern:    I evaluated Jenae Villeda on 11/17/20. Jenae Villeda should be excused from work/school.     Do not go to work.      If you receive a negative COVID-19 test result and were NOT exposed to someone with a known positive  COVID-19 test, you can return to work once you're free of fever for 24 hours without fever-reducing medication and your symptoms are improving or resolved.    If you receive a positive COVID-19 test result, you must be cleared by Employee Occupational Health and Safety to return to work.     If you were exposed to someone who has tested positive for COVID-19, you can return to work 14 days after your last contact with the positive individual, provided you do not have symptoms at all during that time. In some cases, your manager may ask you to come back sooner than 14 days.       Sincerely,  Chago Wolfe PA-C

## 2020-11-18 LAB
SARS-COV-2 RNA SPEC QL NAA+PROBE: NOT DETECTED
SPECIMEN SOURCE: NORMAL

## 2021-01-12 ENCOUNTER — OFFICE VISIT (OUTPATIENT)
Dept: FAMILY MEDICINE | Facility: CLINIC | Age: 36
End: 2021-01-12
Payer: COMMERCIAL

## 2021-01-12 VITALS
BODY MASS INDEX: 24.8 KG/M2 | HEIGHT: 63 IN | SYSTOLIC BLOOD PRESSURE: 122 MMHG | DIASTOLIC BLOOD PRESSURE: 74 MMHG | WEIGHT: 140 LBS | HEART RATE: 98 BPM | OXYGEN SATURATION: 100 % | TEMPERATURE: 98.6 F

## 2021-01-12 DIAGNOSIS — H92.02 OTALGIA, LEFT: Primary | ICD-10-CM

## 2021-01-12 DIAGNOSIS — M54.2 NECK PAIN: ICD-10-CM

## 2021-01-12 PROCEDURE — 99213 OFFICE O/P EST LOW 20 MIN: CPT | Performed by: FAMILY MEDICINE

## 2021-01-12 ASSESSMENT — MIFFLIN-ST. JEOR: SCORE: 1294.17

## 2021-01-12 NOTE — PROGRESS NOTES
"  Assessment & Plan   Otalgia, left  Neck pain  Exam was unremarkable except for some mild left-sided neck tenderness.  Reassurance.  Possible mild URI versus other cause of symptoms.    Recommended preventive visit and informed her that should her Pap smear is due soon.                                 Return in about 2 weeks (around 1/26/2021) for symptoms failing to improve or sooner if worsening.      Arvin Gloria MD      [address] [address]  [employer]   Office: [phone]       Ramonita Emanuel is a 35 year old who presents to clinic today for the following health issues     HPI       Acute Illness  Acute illness concerns: Headache & left Ear issue- feel full (prior mastoidectomy)  Onset/Duration: 5-7 days ago  Symptoms:  Fever: no  Chills/Sweats: no  Headache (location?): YES- fullness in head (on the top of the head); some neck pains and getting better with warm = pack to the neck.   Sinus Pressure: no  Conjunctivitis:    Ear Pain: YES- left ear- 1 week ago had a sharp pain in ear for a second than week away;   Rhinorrhea: no  Congestion: no  Sore Throat: no  Cough: no  Wheeze: no  Decreased Appetite: no  Nausea: no  Vomiting: no  Diarrhea: no  Dysuria/Freq.: no  Dysuria or Hematuria: no  Fatigue/Achiness: no  Sick/Strep Exposure: ST, upper respiratory infection in cousins and aunt and now her kids.    Therapies tried and outcome: allergy med and flonase    She had the covid vaccine ~ 1 week    Review of Systems   Constitutional, HEENT, cardiovascular, pulmonary, GI, , musculoskeletal, neuro, skin, endocrine and psych systems are negative, except as otherwise noted.      Objective    /74   Pulse 98   Temp 98.6  F (37  C) (Tympanic)   Ht 1.6 m (5' 3\")   Wt 63.5 kg (140 lb)   SpO2 100%   BMI 24.80 kg/m    Body mass index is 24.8 kg/m .  Physical Exam   GENERAL: healthy, alert and no distress  NECK: no adenopathy, no " asymmetry, masses, or scars and thyroid normal to palpation  RESP: lungs clear to auscultation - no rales, rhonchi or wheezes  CV: regular rate and rhythm, normal S1 S2, no S3 or S4, no murmur, click or rub, no peripheral edema and peripheral pulses strong  ABDOMEN: soft, nontender, no hepatosplenomegaly, no masses and bowel sounds normal  MS: no gross musculoskeletal defects noted, no edema

## 2021-02-01 ENCOUNTER — INFUSION THERAPY VISIT (OUTPATIENT)
Dept: ONCOLOGY | Facility: CLINIC | Age: 36
End: 2021-02-01
Attending: FAMILY MEDICINE
Payer: COMMERCIAL

## 2021-02-01 DIAGNOSIS — Z00.00 ROUTINE HISTORY AND PHYSICAL EXAMINATION OF ADULT: Primary | ICD-10-CM

## 2021-02-01 PROCEDURE — 999N000109 HC STATISTIC OP CR VISIT

## 2021-02-03 ENCOUNTER — OFFICE VISIT (OUTPATIENT)
Dept: FAMILY MEDICINE | Facility: CLINIC | Age: 36
End: 2021-02-03
Payer: COMMERCIAL

## 2021-02-03 VITALS
HEART RATE: 84 BPM | WEIGHT: 141 LBS | BODY MASS INDEX: 24.98 KG/M2 | TEMPERATURE: 98.2 F | OXYGEN SATURATION: 99 % | HEIGHT: 63 IN | SYSTOLIC BLOOD PRESSURE: 118 MMHG | DIASTOLIC BLOOD PRESSURE: 70 MMHG

## 2021-02-03 DIAGNOSIS — Z00.01 ENCOUNTER FOR ROUTINE ADULT MEDICAL EXAM WITH ABNORMAL FINDINGS: Primary | ICD-10-CM

## 2021-02-03 DIAGNOSIS — Z80.1: ICD-10-CM

## 2021-02-03 DIAGNOSIS — D22.9 MULTIPLE PIGMENTED NEVI: ICD-10-CM

## 2021-02-03 DIAGNOSIS — Z83.719 FAMILY HISTORY OF COLONIC POLYPS: ICD-10-CM

## 2021-02-03 DIAGNOSIS — Z80.3 FAMILY HISTORY OF BREAST CANCER: ICD-10-CM
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
## 2021-02-03 DIAGNOSIS — Z13.6 CARDIOVASCULAR SCREENING; LDL GOAL LESS THAN 160: ICD-10-CM

## 2021-02-03 DIAGNOSIS — Z12.4 SCREENING FOR MALIGNANT NEOPLASM OF CERVIX: ICD-10-CM

## 2021-02-03 PROCEDURE — G0145 SCR C/V CYTO,THINLAYER,RESCR: HCPCS | Performed by: FAMILY MEDICINE

## 2021-02-03 PROCEDURE — 99395 PREV VISIT EST AGE 18-39: CPT | Performed by: FAMILY MEDICINE

## 2021-02-03 PROCEDURE — 87624 HPV HI-RISK TYP POOLED RSLT: CPT | Performed by: FAMILY MEDICINE

## 2021-02-03 ASSESSMENT — MIFFLIN-ST. JEOR: SCORE: 1298.7

## 2021-02-03 NOTE — PROGRESS NOTES
SUBJECTIVE:   CC: Jenae Villeda is an 35 year old woman who presents for preventive health visit and the following other medical problems:      1. Encounter for routine adult medical exam with abnormal findings    2. CARDIOVASCULAR SCREENING; LDL GOAL LESS THAN 160    3. Family history of colonic polyps- mother in her late 50's with precancerous polyps & MGF s/p partial colectomy for multiple precancerous polyps     4. Family history of secondary lung cancer- secondary to smoking - MGF     5. Family history of breast cancer- Mat aunt  in her late 50's, PGM - postmenopausal x 2     6. Exposure to tanning bed, subsequent encounter- many times as a teenager and young adult     7. Multiple pigmented nevi    8. Screening for malignant neoplasm of cervix            Patient has been advised of split billing requirements and indicates understanding: No  Healthy Habits:    Getting at least 3 servings of Calcium per day:  Yes    Bi-annual eye exam:  Yes    Dental care twice a year:  Yes    Sleep apnea or symptoms of sleep apnea:  None    Diet:  Regular (no restrictions)    Frequency of exercise:  2-3 days/week    Duration of exercise:  15-30 minutes    Taking medications regularly:  Not Applicable    Barriers to taking medications:  Not applicable    Medication side effects:  Not applicable    PHQ-2 Total Score:    Additional concerns today:  No              Today's PHQ-2 Score:   PHQ-2 (  [employer]) 2020   Q1: Little interest or pleasure in doing things 0   Q2: Feeling down, depressed or hopeless 0   PHQ-2 Score 0       Abuse: Current or Past (Physical, Sexual or Emotional) - No  Do you feel safe in your environment? Yes        Social History     Tobacco Use     Smoking status: Never Smoker     Smokeless tobacco: Never Used   Substance Use Topics     Alcohol use: Yes     Alcohol/week: 0.0 standard drinks     Comment: 2 drinks/week     If you drink alcohol do you typically have >3 drinks per day or >7  drinks per week? No    Alcohol Use 10/22/2012   Prescreen: >3 drinks/day or >7 drinks/week? The patient does not drink >3 drinks per day nor >7 drinks per week.       Any new diagnosis of family breast, ovarian, or bowel cancer? Yes     Reviewed orders with patient.  Reviewed health maintenance and updated orders accordingly - Yes  Labs reviewed in EPIC  BP Readings from Last 3 Encounters:   21 118/70   21 122/74   04/10/20 112/74    Wt Readings from Last 3 Encounters:   21 64 kg (141 lb)   21 63.5 kg (140 lb)   04/10/20 60.8 kg (134 lb)                  Patient Active Problem List   Diagnosis     Recurrent stomach ache     Mononucleosis     Irritable bowel syndrome with diarrhea- previously saw [facility] - ok for bentyl rx refill      CARDIOVASCULAR SCREENING; LDL GOAL LESS THAN 160     Angular cheilitis     Disorder of ear, left     Perspiration excessive     Dizziness     S/P  section x 2     Previous  delivery, antepartum     Status post  delivery     Exposure to tanning bed, subsequent encounter- many times as a teenager and young adult      Multiple pigmented nevi     Family history of colonic polyps- mother in her late 50's with precancerous polyps & MGF s/p partial colectomy for multiple precancerous polyps      Family history of secondary lung cancer- secondary to smoking - MGF      Family history of breast cancer- Mat aunt  in her late 50's, PGM - postmenopausal x 2      Past Surgical History:   Procedure Laterality Date      SECTION  2014    Procedure:  SECTION;  Surgeon: Izabela Saxena MD;  Location:  L+D      SECTION N/A 2016    Procedure:  SECTION;  Surgeon: Whitney Marshall MD;  Location:  L+D     GYN SURGERY       in  and      MASTOIDECTOMY Left 2017    Procedure: MASTOIDECTOMY;  Surgeon: Sridhar Tran MD;  Location:  OR       Social History     Tobacco Use     Smoking status:  Never Smoker     Smokeless tobacco: Never Used   Substance Use Topics     Alcohol use: Yes     Alcohol/week: 0.0 standard drinks     Comment: 2 drinks/week     Family History   Problem Relation Age of Onset     Neurologic Disorder Mother         trouble with her feet on neurontin, back surgery     Hyperlipidemia Mother      Colon Polyps Mother         pre- cancerous     Hyperlipidemia Father      Breast Cancer Paternal Grandmother         postmenopausal - breast ca x 2 - in remission      Cancer Paternal Grandmother      Cerebrovascular Disease Paternal Grandmother      Gastrointestinal Disease Maternal Grandfather         had portion of colon removed     Cerebrovascular Disease Maternal Grandfather      Neurologic Disorder Maternal Grandfather         aneurysm     Cancer Maternal Grandfather         lung and pr-colon cancer     Colon Cancer Maternal Grandfather      Gastrointestinal Disease Other         celiac lupus, part of colon removed as well as gallbladder     Coronary Artery Disease Maternal Grandmother      Breast Cancer Maternal Aunt         postmenopausal - at age 61      Diabetes No family hx of          Current Outpatient Medications   Medication Sig Dispense Refill     dicyclomine (BENTYL) 20 MG tablet Take 1 tablet (20 mg) by mouth every 6 hours (Patient not taking: Reported on 2/3/2021) 30 tablet 11     fluticasone (FLONASE) 50 MCG/ACT nasal spray SPRAY 1-2 SPRAYS INTO BOTH NOSTRILS DAILY 16 mL 0     guaiFENesin (MUCINEX) 600 MG 12 hr tablet Take 1-2 tablets (600-1,200 mg) by mouth 2 times daily 40 tablet 0     No Known Allergies  Recent Labs   Lab Test 20  0836 18  0750 17  1441 13  1050   LDL 75 76  --   --    HDL 74 61  --   --    TRIG 43 51  --   --    ALT  --  35 51* 44   CR  --  0.73 0.72 0.69   GFRESTIMATED  --  >90 >90  Non  GFR Calc   >90   GFRESTBLACK  --  >90 >90   GFR Calc   >90   POTASSIUM  --  4.0 3.7 3.9   TSH  --  2.48  --   4.64        Breast CA Risk Screening:  No flowsheet data found.  Breast CA Risk Assessment (FHS-7) 2/3/2021   Did any of your first-degree relatives have breast or ovarian cancer? No   Did any of your relatives have bilateral breast cancer? No   Did any man in your family have breast cancer? No   Did any woman in your family have breast and ovarian cancer? No   Did any woman in your family have breast cancer before age 50 y? No   Do you have 2 or more relatives with breast and/or ovarian cancer? Yes   Do you have 2 or more relatives with breast and/or bowel cancer? No       will have pt go for genetic counseling to determine mammogram frequency   Pertinent mammograms are reviewed under the imaging tab.    History of abnormal Pap smear: NO - age 30- 65 PAP every 3 years recommended  PAP / HPV Latest Ref Rng & Units 2018 2014 10/22/2012   PAP - NIL - NIL   HPV 16 DNA NEG:Negative Negative - -   HPV 18 DNA NEG:Negative Negative - -   OTHER HR HPV NEG:Negative Negative - -   HPV-ABSTRA - - Negative -     Reviewed and updated as needed this visit by clinical staff  Tobacco  Allergies  Meds  Problems  Med Hx  Surg Hx  Fam Hx          Reviewed and updated as needed this visit by Provider                OB History    Para Term  AB Living   2 2 2 0 0 2   SAB TAB Ectopic Multiple Live Births   0 0 0 0 2      # Outcome Date GA Lbr Yazan/2nd Weight Sex Delivery Anes PTL Lv   2 Term 16 39w4d  3.439 kg (7 lb 9.3 oz) M CS-LTranv Spinal  MELY      Name: Magnolia      Apgar1: 8  Apgar5: 9   1 Term 14 40w2d 11:20 / 02:45 3.68 kg (8 lb 1.8 oz) M CS-LTranv EPI  MELY      Birth Comments: Followed by [facility] and delivered by [facility]. admit @34 wks for PTL and got steroids. forebag sROM at 40+1. induced for 16 hrs then had AROM main bag. pushed 2 hrs. tried vac x2 but no descent. epidural not working well and lots of pain. c/s done-OP and C      Name: Lam      Apgar1: 9  Apgar5: 9       Review of  "Systems  CONSTITUTIONAL: NEGATIVE for fever, chills, change in weight  INTEGUMENTARU/SKIN: NEGATIVE for worrisome rashes, moles or lesions  EYES: NEGATIVE for vision changes or irritation  ENT: NEGATIVE for ear, mouth and throat problems  RESP: NEGATIVE for significant cough or SOB  BREAST: NEGATIVE for masses, tenderness or discharge  CV: NEGATIVE for chest pain, palpitations or peripheral edema  GI: NEGATIVE for nausea, abdominal pain, heartburn, or change in bowel habits  : NEGATIVE for unusual urinary or vaginal symptoms. Periods are regular.  MUSCULOSKELETAL: NEGATIVE for significant arthralgias or myalgia  NEURO: NEGATIVE for weakness, dizziness or paresthesias  ENDOCRINE: NEGATIVE for temperature intolerance, skin/hair changes  HEME/ALLERGY/IMMUNE: NEGATIVE for bleeding problems  PSYCHIATRIC: NEGATIVE for changes in mood or affect     OBJECTIVE:   /70   Pulse 84   Temp 98.2  F (36.8  C)   Ht 1.6 m (5' 3\")   Wt 64 kg (141 lb)   LMP 01/25/2021   SpO2 99%   BMI 24.98 kg/m    Physical Exam  GENERAL: healthy, alert and no distress  EYES: Eyes grossly normal to inspection, PERRL and conjunctivae and sclerae normal  HENT: ear canals and TM's normal, nose and mouth without ulcers or lesions  NECK: no adenopathy, no asymmetry, masses, or scars and thyroid normal to palpation  RESP: lungs clear to auscultation - no rales, rhonchi or wheezes  BREAST: normal without masses, tenderness or nipple discharge and no palpable axillary masses or adenopathy  CV: regular rate and rhythm, normal S1 S2, no S3 or S4, no murmur, click or rub, no peripheral edema and peripheral pulses strong  ABDOMEN: soft, nontender, no hepatosplenomegaly, no masses and bowel sounds normal   (female): normal female external genitalia, normal urethral meatus, vaginal mucosa pink, moist, well rugated, and normal cervix/adnexa/uterus without masses or discharge  MS: no gross musculoskeletal defects noted, no edema  SKIN: no suspicious " "lesions or rashes  NEURO: Normal strength and tone, mentation intact and speech normal  PSYCH: mentation appears normal, affect normal/bright    Diagnostic Test Results:  Labs reviewed in Epic    ASSESSMENT/PLAN:       ICD-10-CM    1. Encounter for routine adult medical exam with abnormal findings  Z00.01    2. CARDIOVASCULAR SCREENING; LDL GOAL LESS THAN 160  Z13.6 CBC with platelets     Lipid panel reflex to direct LDL Fasting     TSH with free T4 reflex     **Glucose FUTURE anytime   3. Family history of colonic polyps- mother in her late 50's with precancerous polyps & MGF s/p partial colectomy for multiple precancerous polyps   Z83.71 CANCER RISK MGMT/CANCER GENETIC COUNSELING REFERRAL   4. Family history of secondary lung cancer- secondary to smoking - MGF   Z80.1 CANCER RISK MGMT/CANCER GENETIC COUNSELING REFERRAL   5. Family history of breast cancer- Mat aunt  in her late 50's, PGM - postmenopausal x 2   Z80.3 CANCER RISK MGMT/CANCER GENETIC COUNSELING REFERRAL   6. Exposure to tanning bed, subsequent encounter- many times as a teenager and young adult   W89.1XXD SKIN CARE REFERRAL   7. Multiple pigmented nevi  D22.9 SKIN CARE REFERRAL   8. Screening for malignant neoplasm of cervix  Z12.4 Pap imaged thin layer screen with HPV - recommended age 30 - 65 years (select HPV order below)     HPV High Risk Types DNA Cervical       Patient has been advised of split billing requirements and indicates understanding: Yes  COUNSELING:  Reviewed preventive health counseling, as reflected in patient instructions    Estimated body mass index is 24.98 kg/m  as calculated from the following:    Height as of this encounter: 1.6 m (5' 3\").    Weight as of this encounter: 64 kg (141 lb).        She reports that she has never smoked. She has never used smokeless tobacco.      Counseling Resources:  ATP IV Guidelines  Pooled Cohorts Equation Calculator  Breast Cancer Risk Calculator  BRCA-Related Cancer Risk Assessment: " FHS-7 Tool  FRAX Risk Assessment  ICSI Preventive Guidelines  Dietary Guidelines for Americans, 2010  USDA's MyPlate  ASA Prophylaxis  Lung CA Screening    Jia Ramos MD  [facility]

## 2021-02-04 NOTE — TELEPHONE ENCOUNTER
Oncology/Surgical Oncology Referral Request:     Specialty Requested: Medical Oncology     Referring Provider: Jia Ramos MD    Referring Clinic/Organization: [facility]    Records location: [facility]     Requested Provider (if specified): Not Specified

## 2021-02-08 LAB
COPATH REPORT: NORMAL
PAP: NORMAL

## 2021-02-16 ENCOUNTER — PRE VISIT (OUTPATIENT)
Dept: ONCOLOGY | Facility: CLINIC | Age: 36
End: 2021-02-16

## 2021-02-16 ENCOUNTER — VIRTUAL VISIT (OUTPATIENT)
Dept: ONCOLOGY | Facility: CLINIC | Age: 36
End: 2021-02-16
Attending: FAMILY MEDICINE
Payer: COMMERCIAL

## 2021-02-16 DIAGNOSIS — Z80.42 FAMILY HISTORY OF PROSTATE CANCER: ICD-10-CM

## 2021-02-16 DIAGNOSIS — Z80.1 FAMILY HISTORY OF LUNG CANCER: ICD-10-CM

## 2021-02-16 DIAGNOSIS — Z80.3 FAMILY HISTORY OF MALIGNANT NEOPLASM OF BREAST: Primary | ICD-10-CM

## 2021-02-16 DIAGNOSIS — Z83.719 FAMILY HISTORY OF COLONIC POLYPS: ICD-10-CM

## 2021-02-16 PROCEDURE — 96040 HC GENETIC COUNSELING, EACH 30 MINUTES: CPT | Mod: TEL | Performed by: GENETIC COUNSELOR, MS

## 2021-02-16 NOTE — LETTER
Cancer Risk Management  Program Locations    [facility]  [facility] [place] [facility]  Mailing Address  Cancer Risk Management Program  [facility]  [address]  [address]  [place] [address]    New patient appointments  [phone]  February 21, 2021    Jenae Villeda  [address] [address]      Dear Jenae,    It was a pleasure speaking with you on 2-. Here is a copy of the progress note from your recent genetic counseling visit to the Cancer Risk Management Program. If you have any additional questions, please feel free to call.    Cancer Risk Management Program Genetic Counseling Note    2/16/2021    Referring Provider: Dr. Jia Ramos    Presenting Information:   I had a telephone visit with Jenae Villeda today for genetic counseling at the Cancer Risk Management Program through the [facility], in order to discuss her family history of breast cancer, colon polyps, and other cancers.  She is here today to review this history, cancer screening recommendations, and available genetic testing options.  This conversations took place via a telephone visit because of current COVID-[place] restrictions.    Personal History:  Jenae is a 35 year old female. She does not have any personal history of cancer.  In other medical history, she reports that she has a history of irritable bowel syndrome and of a left mastoidectomy because of issues with dizziness and balance.  She also reports a past history of anxiety.    Jenae reports that she had her first menstrual period at age 10 and is currently pre-menopausal.  She reports that she had her first child at age 28.  Jenae reports that she did take oral contraceptives during her teenage years but stopped taking them in  "young adulthood.  Jenae has her ovaries, fallopian tubes, and uterus in place.      Jenae had a previous mammogram and breast ultrasound in 2018 because of some breast pain; these were negative.  Jenae states that she has a colonoscopy and upper endoscopy in  for some rectal bleeding; she reports that no polyps or malignancies were found.  Jenae reports a past history of brain MRIs when she was being evaluated for her dizziness/balance issues and a past CT or MRI of her abdomen in college because of her irritable bowel symptoms.  It is noted in Jenae's chart that she does have multiple nevi, and Jenae states that she plans a dermatology evaluation in March for a skin check. She also gets regular Pap smears. She does not regularly do any other cancer screening at this time.     Jenae reports no personal history of smoking and no personal history of chronic, excessive alcohol use.  Jenae states that she is not aware of any other recent personal history of any specific, potentially concerning environmental exposures with respect to cancer risk.  Jenae does report that her parents smoked cigarrettes in the house until she was middle-school age.  She also reports that she worked in [employer] for 3 summers and has a past history of tanning bed usage.  Jenae states that she works at the [employer] in the [facility] cancer clinic site on the [address] [facility].    Family History: (Please see scanned pedigree for detailed family history information)    Jenae reports that her mother was diagnosed with a \"pre-cancerous\" colon polyp at age 56.    Jenae states that one of her maternal aunts was diagnosed with breast cancer at age 55, and unfortunately,  of complications of this disease at age 57.    Jenae reports that her maternal grandmother had a history of some pre-cancerous polyps in her 60s.    Jenae states that her maternal " "grandfather had hundreds of \"mushroom\" polyps noted in his colon in his 60s; because of this finding, she reports that he underwent a colectomy. Jenae reports that he ultimately  of complications of lung cancer at age 72; she reports that she thinks he may have had a history of smoking, but she wasn't sure about the details.    Jenae reports that her paternal grandmother was diagnosed with breast cancer twice in her 70s; she wasn't sure if this was two separate breast cancers or the recurrence of the first breast cancer.    Jenae reports that her paternal grandfather was diagnosed with prostate cancer; she wasn't sure about his age at diagnosis.    Jenae is not aware of anyone in the family having had previous cancer genetic testing.    Jenae reports that her mother's family is of Turkish, Thai, Finnish, [place], [place], and [place] ancestry; she reports that her father's family is of English, Thai, Finnish, and [place] ancestry. There is no known Ashkenazi Hoahaoism ancestry on either side of her family. There is no reported consanguinity.    Discussion:    We reviewed the features of sporadic, familial, and hereditary cancers. In looking at Jenae's family history, it is possible that a cancer susceptibility gene is present due to her family history of multiple individuals in the family with colon polyps.  In particular, we talked about that her maternal grandfather's history of hundreds of colon polyps, which resulted in the need for his colon to be removed, is very suspicious for the presence of a hereditary predisposition to colon polyps/colon cancer in him.  Of note, Jenae's maternal grandfather would meet National Comprehensive Cancer Network (NCCN) criteria for genetic testing for colon polyposis, but he is not longer living to undergo genetic testing.  We also talked about that there are some hereditary colon cancer/colon polyp conditions that are associated with an " increased risk for breast cancer as well, and so it is possible that there may be a link between her maternal aunt's history of breast cancer and the maternal family history of colon polyps/polyposis.      We discussed the natural history and genetics of hereditary cancer syndromes, particular those associated with colon polyposis. A detailed handout regarding some of these conditions and the information we discussed was mailed to Jenae and can be found in the after visit summary. Topics included: inheritance pattern, cancer risks, cancer screening recommendations, and also risks, benefits and limitations of testing.        We discussed that there are many different genes that could cause increased risk for colon polyps/cancer, breast cancer, and other cancer.  As many of these genes present with overlapping features in a family and accurate cancer risk cannot always be established based upon the pedigree analysis alone, it would be reasonable for Jenae to consider panel genetic testing to analyze multiple genes at once.      Jenae stated that she was interested in pursuing genetic testing through a panel of genes associated with hereditary cancer syndromes, and so we reviewed the various panel options and their potential benefits and limitations.  After this conversation, Jenae decided that she was interested in the CancerNext genetic testing panel.      The CancerNext genetic testing panel involves analysis of 36 genes associated with hereditary cancer: APC, RUBEN, AXIN2, BARD1, BMPR1A, BRCA1, BRCA2, BRIP1, CDH1, CDK4, CDKN2A, CHEK2, DICER1, EPCAM, GREM1, HOXB13, MLH1, MSH2, MSH3, MSH6, MUTYH, NBN, NF1, NTHL1, PALB2, PMS2, POLD1, POLE, PTEN, RAD51C, RAD51D, RECQL, SMAD4, SMARCA4, STK11, and TP53.      We discussed that many of the genes in the CancerNext panel are associated with specific hereditary cancer syndromes and published management guidelines: Hereditary Breast and Ovarian Cancer syndrome  (BRCA1, BRCA2), Ohara syndrome (MLH1, MSH2, MSH6, PMS2, EPCAM), Familial Adenomatous Polyposis (APC), Hereditary Diffuse Gastric Cancer (CDH1), Familial Atypical Multiple Mole Melanoma syndrome (CDK4, CDKN2A), Juvenile Polyposis syndrome (BMPR1A, SMAD4), Cowden syndrome (PTEN), Li Fraumeni syndrome (TP53), Peutz-Jeghers syndrome (STK11), MUTYH Associated Polyposis (MUTYH), and Neurofibromatosis type 1 (NF1).  The RUBEN, AXIN2, BRIP1, CHEK2, GREM1, MSH3, NBN, NTHL1, PALB2, POLD1, POLE, RAD51C, and RAD51D genes are associated with increased cancer risk and have published management guidelines for certain cancers. The remaining genes (BARD1, DICER1, HOXB13, RECQL, and SMARCA4) are associated with increased cancer risk and may allow us to make medical recommendations when mutations are identified.        Medical Management: For Jenae, we reviewed that the information from genetic testing may determine:    additional cancer screening for which Jenae may qualify (i.e. mammogram and breast MRI, more frequent colonoscopies, more frequent dermatologic exams, etc.),    options for risk-reducing surgeries Jenae could consider if indicated (i.e. bilateral mastectomy, surgery to remove her ovaries and/or uterus, etc.),      and surgeries and targeted therapies for Jenae, if she were to develop certain cancers in the future (i.e. immunotherapy for individuals with Ohara syndrome, PARP inhibitors for HBOC syndrome, etc.).       These medical management recommendations will be discussed in detail once genetic testing is completed.     Plan:  1) Today, Jenae decided to proceed with a CancerNext genetic testing panel.  Therefore, consent was reviewed and verbally obtained for this testing. We reviewed the options for sample collection (blood drawn versus saliva collection), and Jenae stated that she plans to have her blood drawn for genetic testing at the same as some other routine labs requested by her primary  care provider.    2) Test results are generally expected to be available within 4-6 weeks, once the sample arrives at the laboratory.    3) Jenae will either have a telephone visit or video visit to discuss the results.  Additional recommendations about screening will be made at that time.    Perla Brown MS, [facility]  Genetic Counselor  Ph: [phone]      Face to face time: 60 minutes

## 2021-02-16 NOTE — PROGRESS NOTES
Cancer Risk Management Program Genetic Counseling Note    2/16/2021    Referring Provider: Dr. Jia Ramos    Presenting Information:   I had a telephone visit with Jenae Villeda today for genetic counseling at the Cancer [facility] Management Program through the [facility], in order to discuss her family history of breast cancer, colon polyps, and other cancers.  She is here today to review this history, cancer screening recommendations, and available genetic testing options.  This conversations took place via a telephone visit because of current COVID-19 restrictions (see attestation below).    Personal History:  Jenae is a 35 year old female. She does not have any personal history of cancer.  In other medical history, she reports that she has a history of irritable bowel syndrome and of a left mastoidectomy because of issues with dizziness and balance.  She also reports a past history of anxiety.    Jenae reports that she had her first menstrual period at age 10 and is currently pre-menopausal.  She reports that she had her first child at age 28.  Jenae reports that she did take oral contraceptives during her teenage years but stopped taking them in young adulthood.  Jenae has her ovaries, fallopian tubes, and uterus in place.      Jenae had a previous mammogram and breast ultrasound in 2018 because of some breast pain; these were negative.  Jenae states that she has a colonoscopy and upper endoscopy in 2011 for some rectal bleeding; she reports that no polyps or malignancies were found.  Jenae reports a past history of brain MRIs when she was being evaluated for her dizziness/balance issues and a past CT or MRI of her abdomen in college because of her irritable bowel symptoms.  It is noted in Jenae's chart that she does have multiple nevi, and Jenae states that she plans a dermatology evaluation in March for a skin check. She also gets  "regular Pap smears. She does not regularly do any other cancer screening at this time.     Jenae reports no personal history of smoking and no personal history of chronic, excessive alcohol use.  Jenae states that she is not aware of any other recent personal history of any specific, potentially concerning environmental exposures with respect to cancer risk.  Jenae does report that her parents smoked cigarrettes in the house until she was middle-school age.  She also reports that she worked in [employer] for 3 summers and has a past history of tanning bed usage.  Jenae states that she works at the [employer] in the [facility] cancer clinic site on the [facility].    Family History: (Please see scanned pedigree for detailed family history information)    Jenae reports that her mother was diagnosed with a \"pre-cancerous\" colon polyp at age 56.    Jenae states that one of her maternal aunts was diagnosed with breast cancer at age 55, and unfortunately,  of complications of this disease at age 57.    Jenae reports that her maternal grandmother had a history of some pre-cancerous polyps in her 60s.    Jenae states that her maternal grandfather had hundreds of \"mushroom\" polyps noted in his colon in his 60s; because of this finding, she reports that he underwent a colectomy. Jenae reports that he ultimately  of complications of lung cancer at age 72; she reports that she thinks he may have had a history of smoking, but she wasn't sure about the details.    Jenae reports that her paternal grandmother was diagnosed with breast cancer twice in her 70s; she wasn't sure if this was two separate breast cancers or the recurrence of the first breast cancer.    Jenae reports that her paternal grandfather was diagnosed with prostate cancer; she wasn't sure about his age at diagnosis.    Jenae is not aware of anyone in the family having " had previous cancer genetic testing.    Jenae reports that her mother's family is of Thai, [place], [place], Portuguese, [place], and [place] ancestry; she reports that her father's family is of English, [place], [place], and [place] ancestry. There is no known Ashkenazi Islam ancestry on either side of her family. There is no reported consanguinity.    Discussion:    We reviewed the features of sporadic, familial, and hereditary cancers. In looking at Jenae's family history, it is possible that a cancer susceptibility gene is present due to her family history of multiple individuals in the family with colon polyps.  In particular, we talked about that her maternal grandfather's history of hundreds of colon polyps, which resulted in the need for his colon to be removed, is very suspicious for the presence of a hereditary predisposition to colon polyps/colon cancer in him.  Of note, Jenae's maternal grandfather would meet National Comprehensive Cancer Network (NCCN) criteria for genetic testing for colon polyposis, but he is not longer living to undergo genetic testing.  We also talked about that there are some hereditary colon cancer/colon polyp conditions that are associated with an increased risk for breast cancer as well, and so it is possible that there may be a link between her maternal aunt's history of breast cancer and the maternal family history of colon polyps/polyposis.      We discussed the natural history and genetics of hereditary cancer syndromes, particular those associated with colon polyposis. A detailed handout regarding some of these conditions and the information we discussed was mailed to Jenae and can be found in the after visit summary. Topics included: inheritance pattern, cancer risks, cancer screening recommendations, and also risks, benefits and limitations of testing.        We discussed that there are many different genes that could cause increased risk for colon  polyps/cancer, breast cancer, and other cancer.  As many of these genes present with overlapping features in a family and accurate cancer risk cannot always be established based upon the pedigree analysis alone, it would be reasonable for Jenae to consider panel genetic testing to analyze multiple genes at once.      Jenae stated that she was interested in pursuing genetic testing through a panel of genes associated with hereditary cancer syndromes, and so we reviewed the various panel options and their potential benefits and limitations.  After this conversation, Jenae decided that she was interested in the CancerNext genetic testing panel.      The CancerNext genetic testing panel involves analysis of 36 genes associated with hereditary cancer: APC, RUBEN, AXIN2, BARD1, BMPR1A, BRCA1, BRCA2, BRIP1, CDH1, CDK4, CDKN2A, CHEK2, DICER1, EPCAM, GREM1, HOXB13, MLH1, MSH2, MSH3, MSH6, MUTYH, NBN, NF1, NTHL1, PALB2, PMS2, POLD1, POLE, PTEN, RAD51C, RAD51D, RECQL, SMAD4, SMARCA4, STK11, and TP53.      We discussed that many of the genes in the CancerNext panel are associated with specific hereditary cancer syndromes and published management guidelines: Hereditary Breast and Ovarian Cancer syndrome (BRCA1, BRCA2), Ohara syndrome (MLH1, MSH2, MSH6, PMS2, EPCAM), Familial Adenomatous Polyposis (APC), Hereditary Diffuse Gastric Cancer (CDH1), Familial Atypical Multiple Mole Melanoma syndrome (CDK4, CDKN2A), Juvenile Polyposis syndrome (BMPR1A, SMAD4), Cowden syndrome (PTEN), Li Fraumeni syndrome (TP53), Peutz-Jeghers syndrome (STK11), MUTYH Associated Polyposis (MUTYH), and Neurofibromatosis type 1 (NF1).  The RUBEN, AXIN2, BRIP1, CHEK2, GREM1, MSH3, NBN, NTHL1, PALB2, POLD1, POLE, RAD51C, and RAD51D genes are associated with increased cancer risk and have published management guidelines for certain cancers. The remaining genes (BARD1, DICER1, HOXB13, RECQL, and SMARCA4) are associated with increased cancer risk and  may allow us to make medical recommendations when mutations are identified.        Medical Management: For Jenae, we reviewed that the information from genetic testing may determine:    additional cancer screening for which Jenae may qualify (i.e. mammogram and breast MRI, more frequent colonoscopies, more frequent dermatologic exams, etc.),    options for risk-reducing surgeries Jenae could consider if indicated (i.e. bilateral mastectomy, surgery to remove her ovaries and/or uterus, etc.),      and surgeries and targeted therapies for Jenae, if she were to develop certain cancers in the future (i.e. immunotherapy for individuals with Ohara syndrome, PARP inhibitors for HBOC syndrome, etc.).       These medical management recommendations will be discussed in detail once genetic testing is completed.     Plan:  1) Today, Jenae decided to proceed with a CancerNext genetic testing panel.  Therefore, consent was reviewed and verbally obtained for this testing. We reviewed the options for sample collection (blood drawn versus saliva collection), and Jenae stated that she plans to have her blood drawn for genetic testing at the same as some other routine labs requested by her primary care provider.    2) Test results are generally expected to be available within 4-6 weeks, once the sample arrives at the laboratory.    3) Jenae will either have a telephone visit or video visit to discuss the results.  Additional recommendations about screening will be made at that time.    Perla Brown MS, [facility]  Genetic Counselor  Ph: [phone]      Face to face time: 60 minutes    Jenae is a 35 year old who is being evaluated via a billable telephone visit.      What phone number would you like to be contacted at? [phone]  How would you like to obtain your AVS? mail    Phone call duration: 60 minutes

## 2021-02-16 NOTE — LETTER
"    Cancer Risk Management  Program [facility]  [facility] [place] [facility]  Mailing Address  Cancer Risk Management Program  [address]  [place] [address]    New patient appointments  [phone]  February 16, 2021    Jenae Villeda  [address] [facility] [address]      Dear Jenae,    It was a pleasure speaking with you on the phone on February 16, 2021. Here is a copy of the general \"after visit summary\" from our conversation.  If you have any additional questions, please feel free to call.  The first handout is about colon cancer genetics; the second is about breast cancer genetics. You will also later receive in the mail a more detailed clinic note of our conversation as well.      Assessing Cancer Risk  Only about 5-10% of cancers are thought to be due to an inherited cancer susceptibility gene.    These families often have:  ? Several people with the same or related types of cancer  ? Cancers diagnosed at a young age (before age 50)  ? Individuals with more than one primary cancer  ? Multiple generations of the family affected with cancer    Comprehensive Colon Cancer Panel  We each inherit two copies of every gene in our bodies: one from our mother, and one from our father.  Each gene has a specific job to do.  When a gene has a mistake or  mutation  in it, it does not work like it should.      This handout will review common hereditary colon cancer syndromes, and other genes related to an increased risk for colon cancer.  The genes that will be discussed in this handout are: APC, BMPR1A, CDH1, CHEK2, EPCAM, GREM1, MLH1, MSH2, MSH6, MUTYH, PMS2, POLD1, POLE, PTEN, SMAD4, STK11, and TP53.  These genes are clinically actionable, meaning there are published guidelines for cancer " screening and management for individuals who are found to carry mutations in these genes. Inheriting a mutation does not mean a person will develop cancer, but it does significantly increase his or her risk above the general population risk.      Familial Adenomatous Polyposis (FAP)  FAP is a hereditary cancer syndrome caused by mutations in the APC gene. The condition is known to cause hundreds to thousands of adenomatous polyps in the colon, creating a  carpet  of polyps. Some individuals have what is called attenuated FAP (AFAP), a milder form of FAP with fewer polyps and typically later onset. Individuals with an APC gene mutation are at an increased risk for colon, thyroid, and duodenal cancers, as well as several other types of cancer1.  Other features of this condition may include: osteomas, dental anomalies, benign skin lesions, CHRPE ( freckle  on the inside of the eye), and desmoid tumors.      Lifetime Cancer Risks     Cancer Type General Population FAP   Colon  5% near 100%   Thyroid (papillary) 1% 1-12%   Duodenal <1% 5%   Liver  <1% 1-2% before age 5   Pancreas <1% 1%   Stomach <1% 1%       Juvenile Polyposis Syndrome (JPS)  JPS is characterized by hamartomatous polyps, called juvenile polyps, in the gastrointestinal tract.  Juvenile  refers to the type of polyps seen in this hereditary cancer condition, not the age of onset. Currently, mutations in two genes are known to cause JPS: BMPR1A and SMAD4. Of individuals clinically diagnosed with JPS, only 40% have an identifiable mutation in one of these genes, suggesting there are other genes that cause JPS that have not been discovered yet. Individuals with JPS are at an increased risk for colon cancer and stomach cancer 2,3,4. Pancreatic and small bowel cancers have also been reported in JPS, but the actual risks are unknown.         Lifetime Cancer Risks    Cancer Type General Population JPS   Colon 5% 40-50%   Gastric/Duodenal <1% 10-21%     Some  individuals with SMAD4 mutations have a condition called JPS/HHT (Juvenile Polyposis/Hereditary Hemorrhagic Telangiectasia) where in addition to JPS, individuals may have nose bleeds and clotting issues.     Hereditary Diffuse Gastric Cancer (HDGC)  Currently, mutations in one gene are known to cause Hereditary Diffuse Gastric Cancer: CDH1.  Individuals with HDGC are at increased risk for diffuse gastric cancer and lobular breast cancer. Of people diagnosed with HDGC, 30-50% have a mutation in the CDH1 gene.  This suggests there are likely mutations in other genes that may cause HDGC that have not been identified yet. Individuals with HDGC may also be at increased risk for colon cancer.      Lifetime Cancer Risks    Cancer Type General Population HDGC   Diffuse Gastric <1% 67-83%   Breast 12% 39-52%     Ohara syndrome  Mutations in five different genes are known to cause Ohara syndrome: MLH1, MSH2, MSH6, PMS2, and EPCAM. Individuals with Ohara syndrome have an increased risk for colon, uterine, ovarian, small bowel, stomach, urinary tract, and brain cancer, as well as several other types of cancer. The exact lifetime cancer risks are dependent upon the gene in which the mutation was identified.      Lifetime Cancer Risks    Cancer Type General Population Ohara syndrome   Colon 5% 12-52%   Uterine 2-3% Up to 57%   Stomach <1% Up to 16%   Ovarian 2% Up to 38%   Urinary tract <1% 1-7%   Hepatobiliary tract <1% 1-4%   Small bowel <1% Up to 11%   Brain/CNS <1% Not well established   Pancreas <1% Up to 6%       MUTYH-Associated Polyposis (MAP)  MAP is a hereditary cancer syndrome caused by mutations in the MUTYH gene. Unlike the other hereditary cancer genes discussed in this handout, two mutations in the MUTYH gene cause MAP and increase cancer risk. Those affected with MAP typically have between  adenomatous polyps. This syndrome also increases the risk for colon and duodenal cancer. Current research suggests that  other cancers may be associated with MUTYH mutations, as well. The table below includes the risk that someone with two MUTYH gene mutations would develop cancer in their lifetime; of note, there is also an increased colon cancer risk for individuals who carry only one MUTYH gene mutation5,6,7.       Lifetime Cancer Risks    Cancer Type General Population MAP   Colon 5% %   Duodenal  <1% 5%       Cowden syndrome  Cowden syndrome is a hereditary condition that increases the risk for breast, thyroid, endometrial, colon, and kidney cancer.  A single mutation in the PTEN gene causes Cowden syndrome and increases cancer risk.  The table below shows the chance that someone with a PTEN mutation would develop cancer in their lifetime8,9.  Other benign features seen in some individuals with Cowden syndrome include benign skin lesions (facial papules, keratoses, lipomas), learning disabilities, autism, thyroid nodules, hamartomatous colon polyps, and larger head size.      Lifetime Cancer Risks   Cancer Type General Population Cowden Syndrome   Breast 12% 25-50%*   Thyroid 1% 35%   Renal 1-2% 35%   Endometrial 2-3% 28%   Colon 5% 9%   Melanoma 2-3% >5%     *One recent study found breast cancer risk to be increased to 85%    Peutz-Jeghers syndrome (PJS)  PJS is a hereditary cancer syndrome caused by mutations in the STK11 gene. This condition can be distinguished from other hereditary syndromes by the presence of hamartomatous polyps in the gastrointestinal tract and freckles present in unusual places such as the hands, feet, neck, and lips. Individuals with Peutz-Jeghers syndrome have an increased risk for colon, breast, pancreatic, and other cancers3.  Men are at risk for testicular tumors which can affect hormones in the body. Women are at risk for sex cord tumors of the ovaries and a rare aggressive type of cervical cancer.     Lifetime Cancer Risks    Cancer Type General Population PJS   Breast 12% 45-50%   Colon 5%  39%   Stomach <1% 29%   Pancreas 1.5% 11-36%   Small Intestine <1% 13%     Ovarian  2%  18%   Lung 6-7% 15-17%     Additional Genes Associated with Increased Colon Cancer Risk  CHEK2  CHEK2 is a moderate-risk breast cancer gene.  Women who have a mutation in CHEK2 have around a 2-fold increased risk for breast cancer compared to the general population, and this risk may be higher depending upon family history.12,13,14.  Mutations in CHEK2 have also been shown to increase the risk of a number of other cancers, including colon and prostate, however these cancer risks are currently not well understood.     GREM1  GREM1 is a moderate-risk colon polyposis gene. Duplications of this gene are more commonly found in individuals with Ashkenazi [employer]ism [id number]. Mutations in GREM1 are associated with colon polyps and therefore an increased risk of colon cancer; however the estimated cancer risk is not well [id number].     POLD1 and POLE  POLD1 and POLE are moderate-risk colon cancer genes. Carriers of a mutation in one of these genes increases the lifetime risk of colorectal [id number],18,19,20. Mutations in these genes may also be associated with increased risk for other cancers including: endometrial cancer, duodenal adenomas and carcinomas, and brain tumors.    TP53  Li Fraumeni syndrome (LFS) is a hereditary cancer predisposition syndrome. LFS is caused by a mutation in the TP53 gene. A single mutation in one of the copies of TP53 increases the risk for multiple cancers. Individuals with LFS are at an increased risk for developing cancer at a young age. The general lifetime risk for development of cancer is 50% by age 30 and 90% by age 60.      Core Cancers: Sarcomas, Breast, Brain, Lung, Leukemias/Lymphomas, Adrenocortical carcinomas  Other Cancers: Gastrointestinal, Thyroid, Skin, Genitourinary    Genetic Testing  Genetic testing involves a simple blood test and will look at the genetic information in genes associated  with an increased risk of colon cancer. The tests look for any harmful mutations that are associated with increased cancer risk.  If possible, it is recommended that the person(s) who has had cancer be tested before other family members.  That person will give us the most useful information about whether or not a specific gene mutation is associated with the cancer in the family.     Results  There are three possible results from genetic testing:  ? Positive--a harmful mutation was identified  ? Negative--no mutation was identified  ? Variant of unknown significance--a variation in one of the genes was identified, but it is unclear how this impacts cancer risk in the family  Advantages and Disadvantages  There are advantages and disadvantages to genetic testing of these genes.    Advantages  ? May clarify your cancer risk  ? Can help you make medical decisions  ? May explain the cancers in your family  ? May give useful information to your family members (if you share your results)    Disadvantages  ? Possible negative emotional impact of learning about inherited cancer risk  ? Uncertainty in interpreting a negative test result in some situations  ? Possible genetic discrimination concerns (see below)    Inheritance   Most mutations in the genes outlined above are inherited in an autosomal dominant pattern.  This means that if a parent has a mutation, each of his or her children will have a 50% chance of inheriting that same mutation.  Therefore, each child--male or female--would have a 50% chance of being at increased risk for developing cancer.                                            Image obtained from Genetics Home Reference, 2013     In the case of MUTYH-Associated Polyposis (MAP) this hereditary cancer syndrome is inherited in an autosomal recessive pattern. This means that each parent of an individual with MAP is a carrier of MAP, meaning that they have only one mutation in MUTYH. They still have one  functioning copy of their gene.  Carriers are at a slightly higher risk for colon cancer than the general population. If each parent is a carrier for MAP, they have a 25% of having a child who is affected with MAP, meaning the child inherited both gene mutations - one from each parent.       Image obtained from Genetics Home Reference, 2016    Genetic Information Nondiscrimination Act (CATRACHITO)  CAT[place]O is a federal law that protects individuals from health insurance or employment discrimination based on a genetic test result alone.  Although rare, there are currently no legal protections in terms of life insurance, long term care, or disability insurances.  Visit the [employer] [place] at Genome.gov/29402048 to learn more.    Reducing Cancer Risk  Each of the genes listed within this handout have nationally recognized cancer screening guidelines that would be recommended for individuals who test positive.  In addition to increased cancer screening, surgeries may be offered or recommended to reduce cancer risk in certain cases.  Recommendations are based upon an individual s genetic test result as well as their personal and family history of cancer.    Questions to Think About Regarding Genetic Testing  ? What effect will the test result have on me and my relationship with my family members if I have an inherited gene mutation?  If I don t have a gene mutation?  ? Should I share my test results, and how will my family react to this news, which may also affect them?  ? Are my children ready to learn new information that may one day affect their own health?    Resources    [facility] World [email or web address].LeadPages   No Stomach for Cancer, Inc. nostomachforcancer.org   Stomach Cancer Relief Network scrnet.org   Collaborative Group of the Americas on Inherited Colorectal Cancer (CGA) cgaicc.com   Cancer Care cancercare.org   American Cancer Society (ACS) cancer.org   National Cancer [place] (NCI) cancer.org   Lev  Syndrome International [email or web address]       Please call us if you have any questions or concerns.     Cancer Risk Management Program [phone]-CANCER ([phone])  ? Dusty Dale, MS, [facility] [phone]  X   Perla Brown, MS, LGC  [phone]     [email or web address]  ? Joyce Ta, MS, LGC  [phone]  ? Jessie Womack, MS, [facility]  [phone]  ? Rhiannon Morales, MS, [facility] [phone]  ? Vanessa Tomlin, MS, [facility] [phone]  ? Arlene Steele, MS, [facility]  [phone]    References    1. Zoey GONZALEZ, Matthieu J, Jah G, Moira E, Taylor J, et al. The Prevalence of thyroid cancer and benign thyroid disease in patients with familial adenomatous polyposis may be higher than previously recognized. Clin Colorectal Cancer. 2012;11:304-308.  2. Bookers L, Van Raysa A, Fransisco L, Salomón C, Bindu K, et al. Risk of colorectal cancer in juvenile polyposis. Gut. 2007;56:965-967.  3. Quinteros FG, Merlyn [place], Juan Ramon [place]. Colorectal cancer risk in hamartomatous polyposis syndromes. World Journal of Gastrointestinal Surgery. 2015;27:25-32  4. María MG. Guidance on gastrointestinal surveillance for hereditary non-polyposis colorectal cancer, familial adenomatous polypolis, juvenile polyposis, and Peutz-Jeghers syndrome. Gut. 2002;51:21-27.  5. Israel AK, Rafy CONNIE, Eddi MORENOG et al. Risk of extracolonic cancers for people with biallelic and monoallelic mutations in MUTYH. Int J of Cancer. 2016;139:5275-8236.  6. Carolyn S, Raul S, Sera H, Luisana K, Sunita M, et al. MUTYH-associated polyposis: 70 of 71 patients with biallelic mutations present with an attenuated or atypical phenotype. Int J of Cancer. 2006;119:807-814.  7. Aaorn FUENTES, Magaly F, Braulio I, Nida M, Aaron H, et al. MUTYH mutation carriers have increased breast cancer risk. Cancer. 2012;1035-4908.  8. Lars BURNETTE, Ana J, Damian J, Lindsey LA, Purvi JUNIOR, Luli C. Lifetime cancer risks in individuals with germline PTEN mutations. Clin Cancer Res.  2012;18:400-7.  9. Dell ZAMBRANO. Cowden Syndrome: A Critical Review of the Clinical Literature. J Becca . 2009:18:13-27.  10. National Comprehensive Cancer Network. Clinical practice guidelines in oncology, colorectal cancer screening. Available online (registration required). 2013.  11. National Cancer [place]. SEER Cancer Stat Fact Sheets.  December 2013.  12. CHEK2 Breast Cancer Case-Control Consortium. CHEK2*1100delC and susceptibility to breast cancer: A collaborative analysis involving 10,860 breast cancer cases and 9,065 controls from 10 studies. Am J Hum Becca, 74 (2004), pp. 5006-3412  13. Hanson T, Darlene S, Shay K, et al. Spectrum of Mutations in BRCA1, BRCA2, CHEK2, and TP53 in Families at High Risk of Breast Cancer. KIAN. 2006;295(12):4814-9927.  14. Endy C, Aston D, Tevin ZHANG, et al. Risk of breast cancer in women with a CHEK2 mutation with and without a family history of breast cancer. J Clin Oncol. 2011;29:7429-0854.  15. Tayo J, Eliu E, Chadwick J, Maikolo N, Baldomero M et al. Defining the polyposis/colorectal cancer phenotype associated with the GREM1 duplication: counseling and management guidelines. Becca .Res. 2016;98:1-5.  16. Susie GONZALEZ, Caty S, Guerrero A, Sowmya Pascual, et al. Hereditary mixed polyposis syndrome is caused by a 40kb upstream duplication that leads to increased and ectopic expression of the BMP antagonist GREM1. Afshan Becca. 2015;44:699-703.  17. RICHIE Bullock. et al. Germline mutations affecting the proofreading domains of POLE and POLD1 predispose to colorectal adenomas and carcinomas. Afshan. Becca. 45, 136-44 (2013).  18. CARLY Fischer. et al. POLE and POLD1 mutations in 529 tian with familial colorectal cancer and/or polyposis: review of reported cases and recommendations for genetic testing and surveillance. Becca. Med. (2015). doi:10.1038/gim.2015.75  19. LEILA Roberto et al. New insights into POLE and POLD1 germline mutations in familial colorectal cancer and  polyposis. Hum. Mol. Becca. 23, 0438-15 (2014).  20. CHARLEY Arias. et al. Frequency and phenotypic spectrum of germline mutations in POLE and seven other polymerase genes in 266 patients with colorectal adenomas and carcinomas. Int. J. Cancer 137, 320-31 (2015).       Assessing Cancer Risk  Only about 5-10% of cancers are thought to be due to an inherited cancer susceptibility gene.    These families often have:    Several people with the same or related types of cancer    Cancers diagnosed at a young age (before age 50)    Individuals with more than one primary cancer    Multiple generations of the family affected with cancer    Some people may be candidates for genetic testing of more than one gene.  For these families, genetic testing using a multi-gene cancer panel may be offered.  These panels may test genes known to increase the risk for breast (and other) cancers: RUBEN, BRCA1, BRCA2, CDH1, CHEK2, PALB2, PTEN, and TP53.  The purpose of this handout is to serve as a brief summary of the high/moderate-risk breast cancer genes which have published clinical management guidelines for individuals who are found to carry a mutation.    Hereditary Breast and Ovarian Cancer Syndrome (HBOC)  A single mutation in one of the copies of BRCA1 or BRCA2 increases the risk for breast and ovarian cancer, among others.  The risk for pancreatic cancer and melanoma may also be slightly increased in some families.  The tables below list the chance that someone with a BRCA mutation would develop cancer in his or her lifetime1,2,3,4.          Women   Men     General Population BRCA+    General Population BRCA+   Breast  12% 40-80%  Breast <1% ~7%   Ovarian  1-2% 10-40%  Prostate 16% 20%     A person s ethnic background is also important to consider, as individuals of Ashkenazi Synagogue ancestry have a higher chance of having a BRCA gene mutation.  There are three BRCA mutations that occur more frequently in this population.    Li-Fraumeni  Syndrome (LFS)  LFS is a cancer predisposition syndrome. Individuals with LFS are at an increased risk for developing cancer at a young age. The general lifetime risk for development of cancer is 50% by age 30 and 90% by age 60.  LFS is caused by a mutation in the TP53 gene.  A single mutation in one of the copies of TP53 increases the risk for multiple cancers.     Core Cancers: Sarcomas, Breast, Brain, Lung, Leukemias/Lymphomas, Adrenocortical carcinomas  Other Cancers: Gastrointestinal, Thyroid, Skin, Genitourinary    Cowden Syndrome  Cowden syndrome is a hereditary condition that increases the risk for breast, thyroid, endometrial, and kidney cancer.  Cowden syndrome is caused by a mutation in the PTEN gene.  A single mutation in one of the copies of PTEN causes Cowden syndrome and increases cancer risk.  The table below shows the chance that someone with a PTEN mutation would develop cancer in their lifetime5,6.  Other benign features seen in some individuals with Cowden syndrome include benign skin lesions (facial papules, keratoses, lipomas), learning disability, autism, thyroid nodules, colon polyps, and larger head size.      Lifetime Cancer Risk   Cancer Type General Population Cowden Syndrome   Breast  12% 25-50%*   Thyroid  1% 35%   Renal 1-2% 35%   Endometrial  2-3% 28%   Colon 5% 9%   Melanoma 2-3% >5%     *One recent study found breast cancer risk to be increased to 85%    Hereditary Diffuse Gastric Cancer (HDGC)  Currently, one gene is known to cause hereditary diffuse gastric cancer: CDH1.  Individuals with HDGC are at increased risk for diffuse gastric cancer and lobular breast cancer. Of people diagnosed with HDGC, 30-50% have a mutation in the CDH1 gene.  This suggests there are likely other genes that may cause HDGC that have not been identified yet.      Lifetime Cancer Risks    General Pop HDGC    Diffuse Gastric  <1% ~80%   Breast 12% 39-52%     Additional Genes Associated with Increased  Breast Cancer Risk  PALB2  Mutations in PALB2 have been shown to increase the risk of breast cancer up to 33-58% in some families; where individuals fall within this risk range is dependent upon family history.9 PALB2 mutations have also been associated with increased risk for pancreatic cancer, although this risk has not been quantified yet.  Individuals who inherit two PALB2 mutations--one from their mother and one from their father--have a condition called Fanconi Anemia.  This rare autosomal recessive condition is associated with short stature, developmental delay, bone marrow failure, and increased risk for childhood cancers.    RUBEN  RUBEN is a moderate-risk breast cancer gene. Women who have a mutation in RUBEN can have between a 2-4 fold increased risk for breast cancer compared to the general population.10  RUBEN mutations have also been associated with increased risk for pancreatic cancer, however an estimate of this cancer risk is not well understood.11  Individuals who inherit two RUBEN mutations have a condition called ataxia-telangiectasia (AT).  This rare autosomal recessive condition affects the nervous system and immune system, and is associated with progressive cerebellar ataxia beginning in childhood.  Individuals with ataxia-telangiectasia often have a weakened immune system and have an increased risk for childhood cancers.           CHEK2   CHEK2 is a moderate-risk breast cancer gene.  Women who have a mutation in CHEK2 have around a 2-fold increased risk for breast cancer compared to the general population, and this risk may be higher depending upon family history.12,13,14  Mutations in CHEK2 have also been shown to increase the risk of a number of other cancers, including colon and prostate, however these cancer risks are currently not well understood.         Inheritance   All of the genes reviewed above are inherited in an autosomal dominant pattern.  This means that if a parent has a mutation, each  DISPLAY PLAN FREE TEXT of his or her children will have a 50% chance of inheriting that same mutation.  Therefore, each child--male or female--would have a 50% chance of being at increased risk for developing cancer.    Image obtained from Genetics Home Reference, 2013     Mutations in some genes can occur de mary, which means that a person s mutation occurred for the first time in them and was not inherited from a parent.  Now that they have the mutation, however, it can be passed on to future generations.    Genetic Testing  Genetic testing involves a blood test and will look for any harmful mutations within genes that are associated with increased cancer risk.  If possible, it is recommended that the person(s) who has had cancer be tested before other family members.  That person will give us the most useful information about whether or not a specific gene is associated with the cancer in the family.    Results  There are three possible results of genetic testing:    Positive--a harmful mutation was identified in one or more of the genes    Negative--no mutation was identified in any of the genes on this panel    Variant of unknown significance--a variation in one of the genes was identified, but it is unclear how this impacts cancer risk in the family    Advantages and Disadvantages  There are advantages and disadvantages to genetic testing.  Advantages    May clarify your cancer risk    Can help you make medical decisions    May explain the cancers in your family    May give useful information to your family members (if you share your results)    Disadvantages    Possible negative emotional impact of learning about inherited cancer risk    Uncertainty in interpreting a negative test result in some situations    Possible genetic discrimination concerns (see below)    Genetic Information Nondiscrimination Act (CATRACHITO)  CAT[place]O is a federal law that protects individuals from health insurance or employment discrimination based on a genetic  test result alone.  Although rare, there are currently no legal discrimination protections in terms of life insurance, long term care, or disability insurances.  Visit the National Human Genome Research [place] genome.gov/58939510 to learn more.    Reducing Cancer Risk  Each of the genes listed within this handout have nationally recognized cancer screening guidelines that would be recommended for individuals who test positive.  In addition to increased cancer screening, surgeries may be offered or recommended to reduce cancer risk.  Recommendations are based upon an individual s genetic test result as well as their personal and family history of cancer.    Questions to Think About Regarding Genetic Testing    What effect will the test result have on me and my relationship with my family members if I have an inherited gene mutation?  If I don t have a gene mutation?    Should I share my test results, and how will my family react to this news, which may also affect them?    Are my children ready to learn new information that may one day affect their own health?    Resources  FORCE: Facing Our Risk of Cancer Empowered facingourrisk.org   Bright Pink bebrightpink.org   Li-Fraumeni Syndrome Association lfsassociation.org   PTEN World PTENworld.com   No stomach for cancer, Inc. nostomachforcancer.org   Stomach cancer relief network scrnet.org   Collaborative Group of the Americas on Inherited Colorectal Cancer (CGA) cgaicc.com    Cancer Care cancercare.org   American Cancer Society (ACS) cancer.org   National Cancer [place] (NCI) cancer.gov     Please call us if you have any questions or concerns.   Cancer Risk Management Program [phone] ([phone])  ? Dusty Dale, MS, [facility] [phone]  X   Perla Brown, MS, [facility]  [phone]    [email or web address]  ? Joyce Ta, MS, [facility]  [phone]  ? Rhiannon Morales, MS, [facility] [phone]  ? Vanessa Tomlin, MS, [facility] [phone]  ? Jessie Womack MS,  [facility]  [phone]  ? Arlene Steele, MS, [facility]  [phone]    -References  21. A-lois A, Devante PDP, Bisi S, Piper MENDIETA, Davis JE, Bailee JL, Parul N, Aidee H, Mian O, Ga A, Cosmeini B, Radice P, Manoujonnathan S, Gregg DM, Rivas N, Bassem E, Marcelo H, Casey E, James J, Marium J, Kaiden B, Tulinius H, Thorlacius S, Eerola H, Nevanlinna H, Andreakorani K, Ruthie OP. Average risks of breast and ovarian cancer associated with BRCA1 or BRCA2 mutations detected in case series unselected for family history: a combined analysis of 222 studies. Am J Hum Becca. 2003;72:1117-30.  22. Evan N, Brandie M, Smita G.  BRCA1 and BRCA2 Hereditary Breast and Ovarian Cancer. Gene Reviews online. 2013.  23. Kg YC, Marina S, Nasir G, Ochoa S. Breast cancer risk among male BRCA1 and BRCA2 mutation carriers. J Natl Cancer Inst. 2007;99:1811-4.  24. Jorge DG, Chitra I, Jeferson J, Topher E, Lindsay ER, Fiordaliza F. Risk of breast cancer in male BRCA2 carriers. J Med Becca. 2010;47:710-1.  25. Lars MH, Ana J, Damian J, Lindsey LA, Purvi MS, Eng C. Lifetime cancer risks in individuals with germline PTEN mutations. Clin Cancer Res. 2012;18:400-7.  26. Dell R. Cowden Syndrome: A Critical Review of the Clinical Literature. J Becca . 2009:18:13-27.  27. National Comprehensive Cancer Network. Clinical practice guidelines in oncology, colorectal cancer screening. Available online (registration required). 2013.  28. National Cancer [place]. SEER Cancer Stat Fact Sheets.  December 2013.  29. Migdalia SIERRA, et al. Breast-Cancer Risk in Families with Mutations in PALB2. NEJM. 2014; 371(6):497-506.  30. Ranjit ZHANG, Sha D, Jeremías S, Emilia P, Bandar T, Chai M, Wilbur B, Heidi H, Jacqueline R, Edwin K, Lindsay L, Jorge DG, Gregg D, Austin DF, Christina GARCIA, The Breast Cancer Susceptibility Collaboration (UK) & Yesenia THOMAS. RUBEN mutations that cause ataxia-telangiectasia are breast cancer susceptibility alleles.  Nature Genetics. 2006;38:873-875  31. Miguel Angel N , Priscila Y, Sharon J, Matthew L, Helen GM , Gale ML, Alexsandra S, Coles AG, Syngal S, Hailey ML, Rufus J , Lucio R, Manohar SZ, Kasi JR, Chela VE, Shobha M, Vochanningstein B, Vahid N, Miah RH, Yordy KW, and Fern AP. RUBEN mutations in patients with hereditary pancreatic cancer. Cancer Discover. 2012;2:41-46  32. CHEK2 Breast Cancer Case-Control Consortium. CHEK2*1100delC and susceptibility to breast cancer: A collaborative analysis involving 10,860 breast cancer cases and 9,065 controls from 10 studies. Am J Hum Becca, 74 (2004), pp. 2899-3139  33. Valentin T, Darlene S, Shay K, et al. Spectrum of Mutations in BRCA1, BRCA2, CHEK2, and TP53 in Families at High Risk of Breast Cancer. KIAN. 2006;295(12):7153-2407.   34. Endy C, Aston D, Tevin ZHANG, et al. Risk of breast cancer in women with a CHEK2 mutation with and without a family history of breast cancer. J Clin Oncol. 2011;29:7153-0391

## 2021-05-19 ENCOUNTER — OFFICE VISIT (OUTPATIENT)
Dept: DERMATOLOGY | Facility: CLINIC | Age: 36
End: 2021-05-19
Attending: FAMILY MEDICINE
Payer: COMMERCIAL

## 2021-05-19 VITALS — HEART RATE: 75 BPM | DIASTOLIC BLOOD PRESSURE: 71 MMHG | SYSTOLIC BLOOD PRESSURE: 115 MMHG

## 2021-05-19 DIAGNOSIS — L81.4 LENTIGINES: ICD-10-CM

## 2021-05-19 DIAGNOSIS — D18.01 CHERRY ANGIOMA: ICD-10-CM

## 2021-05-19 DIAGNOSIS — Z91.89 HISTORY OF SUN EXPOSURE, MODERATE: Primary | ICD-10-CM

## 2021-05-19 DIAGNOSIS — D22.9 MULTIPLE BENIGN NEVI: ICD-10-CM

## 2021-05-19 PROCEDURE — 99203 OFFICE O/P NEW LOW 30 MIN: CPT | Performed by: PHYSICIAN ASSISTANT

## 2021-05-19 NOTE — PATIENT INSTRUCTIONS
Proper skin care from [facility]:    -Eliminate harsh soaps as they strip the natural oils from the skin, often resulting in dry itchy skin ( i.e. Dial, Zest, Trixie Spring)  -Use mild soaps such as Cetaphil or Dove Sensitive Skin in the shower. You do not need to use soap on arms, legs, and trunk every time you shower unless visibly soiled.   -Avoid hot or cold showers.  -After showering, lightly dry off and apply moisturizing within 2-3 minutes. This will help trap moisture in the skin.   -Aggressive use of a moisturizer at least 1-2 times a day to the entire body (including -Vanicream, Cetaphil, Aquaphor or Cerave) and moisturize hands after every washing.  -We recommend using moisturizers that come in a tub that needs to be scooped out, not a pump. This has more of an oil base. It will hold moisture in your skin much better than a water base moisturizer. The above recommended are non-pore clogging.      Wear a sunscreen with at least SPF 30 on your face, ears, neck and V of the chest daily. Wear sunscreen on other areas of the body if those areas are exposed to the sun throughout the day. Sunscreens can contain physical and/or chemical blockers. Physical blockers are less likely to clog pores, these include zinc oxide and titanium dioxide. Reapply every two hour and after swimming. Sunscreen examples include Neutrogena, CeraVe, Blue Lizard, Elta MD and many others.    UV radiation  UVA radiation remains constant throughout the day and throughout the year. It is a longer wavelength than UVB and therefore penetrates deeper into the skin leading to immediate and delayed tanning, photoaging, and skin cancer. 70-80% of UVA and UVB radiation occurs between the hours of 10am-2pm.  UVB radiation  UVB radiation causes the most harmful effects and is more significant during the summer months. However, snow and ice can reflect UVB radiation leading to skin damage during the winter months as well. UVB radiation is  responsible for tanning, burning, inflammation, delayed erythema (pinkness), pigmentation (brown spots), and skin cancer.     I recommend self monthly full body exams and yearly full body exams with a dermatology provider. If you develop a new or changing lesion please follow up for examination. Most skin cancers are pink and scaly or pink and pearly. However, we do see blue/brown/black skin cancers.  Consider the ABCDEs of melanoma when giving yourself your monthly full body exam ( don't forget the groin, buttocks, feet, toes, etc). A-asymmetry, B-borders, C-color, D-diameter, E-elevation or evolving. If you see any of these changes please follow up in clinic. If you cannot see your back I recommend purchasing a hand held mirror to use with a larger wall mirror.

## 2021-05-19 NOTE — PROGRESS NOTES
HPI:  I was asked to see pt by Dr. Ramos. Jenae Villeda is a 35 year old female patient here today for FBE. Has red spots on trunk .  Patient states this has been present for a while.  Patient reports the following symptoms: none .  Patient reports the following previous treatments: none.  Patient reports the following modifying factors: none.  Associated symptoms: none.  Patient has no other skin complaints today.  Remainder of the HPI, Meds, PMH, Allergies, FH, and SH was reviewed in chart.    Pertinent Hx:   No personal or family history of skin cancer    Past Medical History:   Diagnosis Date     Diarrhea      GI problem      Headache      Heartburn      IBS (irritable bowel syndrome)      Mononucleosis     Totally recovered     Nasal congestion      Oligomenorrhea      Sore throat        Past Surgical History:   Procedure Laterality Date      SECTION  2014    Procedure:  SECTION;  Surgeon: Izabela Saxena MD;  Location:  [facility]      SECTION N/A 2016    Procedure:  SECTION;  Surgeon: Whitney Marshall MD;  Location:  [facility]     GYN SURGERY       in  and      MASTOIDECTOMY Left 2017    Procedure: MASTOIDECTOMY;  Surgeon: Sridhar Tran MD;  Location:  OR        Family History   Problem Relation Age of Onset     Neurologic Disorder Mother         trouble with her feet on neurontin, back surgery     Hyperlipidemia Mother      Colon Polyps Mother         pre- cancerous     Hyperlipidemia Father      Breast Cancer Paternal Grandmother         postmenopausal - breast ca x 2 - in remission      Cancer Paternal Grandmother      Cerebrovascular Disease Paternal Grandmother      Gastrointestinal Disease Maternal Grandfather         had portion of colon removed     Cerebrovascular Disease Maternal Grandfather      Neurologic Disorder Maternal Grandfather         aneurysm     Cancer Maternal Grandfather         lung and pr-colon cancer      Colon Cancer Maternal Grandfather      Gastrointestinal Disease Other         celiac lupus, part of colon removed as well as gallbladder     Coronary Artery Disease Maternal Grandmother      Breast Cancer Maternal Aunt         postmenopausal - at age 61      Diabetes No family hx of        Social History     Socioeconomic History     Marital status:      Spouse name: Fab     Number of children: 1     Years of education: 17     Highest education level: Not on file   Occupational History     Occupation: RN      Employer: [employer]     Comment: [employer]   Social Needs     Financial resource strain: Not on file     Food insecurity     Worry: Not on file     Inability: Not on file     Transportation needs     Medical: Not on file     Non-medical: Not on file   Tobacco Use     Smoking status: Never Smoker     Smokeless tobacco: Never Used   Substance and Sexual Activity     Alcohol use: Yes     Alcohol/week: 0.0 standard drinks     Comment: 2 drinks/week     Drug use: No     Sexual activity: Yes     Partners: Male     Birth control/protection: Condom     Comment:  had vasectomy and passed semen recheck    Lifestyle     Physical activity     Days per week: Not on file     Minutes per session: Not on file     Stress: Not on file   Relationships     Social connections     Talks on phone: Not on file     Gets together: Not on file     Attends Congregation service: Not on file     Active member of club or organization: Not on file     Attends meetings of clubs or organizations: Not on file     Relationship status: Not on file     Intimate partner violence     Fear of current or ex partner: Not on file     Emotionally abused: Not on file     Physically abused: Not on file     Forced sexual activity: Not on file   Other Topics Concern      Service No     Blood Transfusions No     Caffeine Concern Yes     Comment: 1 per day     Occupational Exposure No     Hobby Hazards No      Sleep Concern No     Stress Concern No     Weight Concern Yes     Comment: slight weight gain over the past couple years.     Special Diet No     Back Care No     Exercise Yes     Bike Helmet No     Seat Belt Yes     Comment: 90% of the time     Self-Exams Yes     Parent/sibling w/ CABG, MI or angioplasty before 65F 55M? Not Asked   Social History Narrative    Lives with 2 friends.  Safe environment and relationship. 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  Full time at [employer].       No outpatient encounter medications on file as of 5/19/2021.     No facility-administered encounter medications on file as of 5/19/2021.        Review Of Systems:  Skin: red spots  Eyes: negative  Ears/Nose/Throat: negative  Respiratory: No shortness of breath, dyspnea on exertion, cough, or hemoptysis  Cardiovascular: negative  Gastrointestinal: negative  Genitourinary: negative  Musculoskeletal: negative  Neurologic: negative  Psychiatric: negative  Hematologic/Lymphatic/Immunologic: negative  Endocrine: negative      Objective:     /71   Pulse 75   Breastfeeding No   Eyes: Conjunctivae/lids: Normal   ENT: Lips:  Normal  MSK: Normal  Cardiovascular: Peripheral edema none  Pulm: Breathing Normal  Neuro/Psych: Orientation: A/O x 3. Normal; Mood/Affect: Normal, NAD, WDWN  Pt accompanied by: self  Following areas examined: Scalp, face, eyelids, lips, neck, chest, abdomen, back, and R&L upper and lower extremities, buttock, hips. Pt defers exam of groin and genitals.   Hernandez skin type:ii   Findings:  Red smooth well-defined macules on trunk and extremities.  Well circumscribed macules with symmetric color distribution on trunk and extremities.  Tan WD smooth macules on face, neck, trunk, and extremities.      Assessment and Plan:     1) Cherry angiomas,Benign nevi, Lentigines     I discussed the specifics of tumor, prognosis, and genetics of benign  lesions.  I explained that treatment of these lesions would be purely cosmetic and not medically neccessary.  I discussed with patient different removal options including excision, cryotherapy, cautery and /or laser.  Lesion may recur and/or may not completely resolve. May need additional treatment.     2) history of sun exposure, moderate  Pt [facility] t[facility] beds [facility]  Signs and Symptoms of non-melanoma skin cancer and ABCDEs of melanoma reviewed with patient. Patient encouraged to perform monthly self skin exams and educated on how to perform them. UV precautions reviewed with patient. Patient was asked about new or changing moles/lesions on body.   Wear a sunscreen with at least SPF 30 on your face, ears, neck and V of the chest daily. Wear sunscreen on other areas of the body if those areas are exposed to the sun throughout the day. Sunscreens can contain physical and/or chemical blockers. Physical blockers are less likely to clog pores, these include zinc oxide and titanium dioxide. Reapply every two hour and after swimming. Sunscreen examples include Neutrogena, CeraVe, Blue Lizard, Elta MD and many others.    Proper skin care from [facility]:    -Eliminate harsh soaps as they strip the natural oils from the skin, often resulting in dry itchy skin ( i.e. Dial, Zest, Maltese Spring)  -Use mild soaps such as Cetaphil or Dove Sensitive Skin in the shower. You do not need to use soap on arms, legs, and trunk every time you shower unless visibly soiled.   -Avoid hot or cold showers.  -After showering, lightly dry off and apply moisturizing within 2-3 minutes. This will help trap moisture in the skin.   -Aggressive use of a moisturizer at least 1-2 times a day to the entire body (including -Vanicream, Cetaphil, Aquaphor or Cerave) and moisturize hands after every washing.  -We recommend using moisturizers that come in a tub that needs to be scooped out, not a pump. This has more of an oil  base. It will hold moisture in your skin much better than a water base moisturizer. The above recommended are non-pore clogging.         It was a pleasure speaking with Jenae Villeda today.       Follow up in 2-3 years for FBE

## 2021-05-19 NOTE — LETTER
2021         RE: Jenae Villeda  [address] [facility] [address]        Dear Colleague,    Thank you for referring your patient, Jenae Villeda, to the [facility]. Please see a copy of my visit note below.    HPI:  I was asked to see pt by Dr. Ramos. Jenae Villeda is a 35 year old female patient here today for FBE. Has red spots on trunk .  Patient states this has been present for a while.  Patient reports the following symptoms: none .  Patient reports the following previous treatments: none.  Patient reports the following modifying factors: none.  Associated symptoms: none.  Patient has no other skin complaints today.  Remainder of the HPI, Meds, PMH, Allergies, FH, and SH was reviewed in chart.    Pertinent Hx:   No personal or family history of skin cancer    Past Medical History:   Diagnosis Date     Diarrhea      GI problem      Headache      Heartburn      IBS (irritable bowel syndrome)      Mononucleosis     Totally recovered     Nasal congestion      Oligomenorrhea      Sore throat        Past Surgical History:   Procedure Laterality Date      SECTION  2014    Procedure:  SECTION;  Surgeon: Izabela Saxena MD;  Location:  L+D      SECTION N/A 2016    Procedure:  SECTION;  Surgeon: Whitney Marshall MD;  Location:  L+D     GYN SURGERY       in  and      MASTOIDECTOMY Left 2017    Procedure: MASTOIDECTOMY;  Surgeon: Sridhar Tran MD;  Location: [facility]        Family History   Problem Relation Age of Onset     Neurologic Disorder Mother         trouble with her feet on neurontin, back surgery     Hyperlipidemia Mother      Colon Polyps Mother         pre- cancerous     Hyperlipidemia Father      Breast Cancer Paternal Grandmother         postmenopausal - breast ca x 2 - in remission      Cancer Paternal Grandmother      Cerebrovascular Disease  Paternal Grandmother      Gastrointestinal Disease Maternal Grandfather         had portion of colon removed     Cerebrovascular Disease Maternal Grandfather      Neurologic Disorder Maternal Grandfather         aneurysm     Cancer Maternal Grandfather         lung and pr-colon cancer     Colon Cancer Maternal Grandfather      Gastrointestinal Disease Other         celiac lupus, part of colon removed as well as gallbladder     Coronary Artery Disease Maternal Grandmother      Breast Cancer Maternal Aunt         postmenopausal - at age 61      Diabetes No family hx of        Social History     Socioeconomic History     Marital status:      Spouse name: Fab     Number of children: 1     Years of education: 17     Highest education level: Not on file   Occupational History     Occupation: RN      Employer: [employer]     Comment: [employer]   Social Needs     Financial resource strain: Not on file     Food insecurity     Worry: Not on file     Inability: Not on file     Transportation needs     Medical: Not on file     Non-medical: Not on file   Tobacco Use     Smoking status: Never Smoker     Smokeless tobacco: Never Used   Substance and Sexual Activity     Alcohol use: Yes     Alcohol/week: 0.0 standard drinks     Comment: 2 drinks/week     Drug use: No     Sexual activity: Yes     Partners: Male     Birth control/protection: Condom     Comment:  had vasectomy and passed semen recheck    Lifestyle     Physical activity     Days per week: Not on file     Minutes per session: Not on file     Stress: Not on file   Relationships     Social connections     Talks on phone: Not on file     Gets together: Not on file     Attends Cheondoism service: Not on file     Active member of club or organization: Not on file     Attends meetings of clubs or organizations: Not on file     Relationship status: Not on file     Intimate partner violence     Fear of current or ex partner: Not on file      Emotionally abused: Not on file     Physically abused: Not on file     Forced sexual activity: Not on file   Other Topics Concern      Service No     Blood Transfusions No     Caffeine Concern Yes     Comment: 1 per day     Occupational Exposure No     Hobby Hazards No     Sleep Concern No     Stress Concern No     Weight Concern Yes     Comment: slight weight gain over the past couple years.     Special Diet No     Back Care No     Exercise Yes     Bike Helmet No     Seat Belt Yes     Comment: 90% of the time     Self-Exams Yes     Parent/sibling w/ CABG, MI or angioplasty before 65F 55M? Not Asked   Social History Narrative    Lives with 2 friends.  Safe environment and relationship. 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  Full time at [employer].       No outpatient encounter medications on file as of 5/19/2021.     No facility-administered encounter medications on file as of 5/19/2021.        Review Of Systems:  Skin: red spots  Eyes: negative  Ears/Nose/Throat: negative  Respiratory: No shortness of breath, dyspnea on exertion, cough, or hemoptysis  Cardiovascular: negative  Gastrointestinal: negative  Genitourinary: negative  Musculoskeletal: negative  Neurologic: negative  Psychiatric: negative  Hematologic/Lymphatic/Immunologic: negative  Endocrine: negative      Objective:     /71   Pulse 75   Breastfeeding No   Eyes: Conjunctivae/lids: Normal   ENT: Lips:  Normal  MSK: Normal  Cardiovascular: Peripheral edema none  Pulm: Breathing Normal  Neuro/Psych: Orientation: A/O x 3. Normal; Mood/Affect: Normal, NAD, WDWN  Pt accompanied by: self  Following areas examined: Scalp, face, eyelids, lips, neck, chest, abdomen, back, and R&L upper and lower extremities, buttock, hips. Pt defers exam of groin and genitals.   Hernandez skin type:ii   Findings:  Red smooth well-defined macules on trunk and extremities.  Well  circumscribed macules with symmetric color distribution on trunk and extremities.  Tan WD smooth macules on face, neck, trunk, and extremities.      Assessment and Plan:     1) Cherry angiomas,Benign nevi, Lentigines     I discussed the specifics of tumor, prognosis, and genetics of benign lesions.  I explained that treatment of these lesions would be purely cosmetic and not medically neccessary.  I discussed with patient different removal options including excision, cryotherapy, cautery and /or laser.  Lesion may recur and/or may not completely resolve. May need additional treatment.     2) history of sun exposure, moderate  Pt used tanning beds in [employer] and [employer]  Signs and Symptoms of non-melanoma skin cancer and ABCDEs of melanoma reviewed with patient. Patient encouraged to perform monthly self skin exams and educated on how to perform them. UV precautions reviewed with patient. Patient was asked about new or changing moles/lesions on body.   Wear a sunscreen with at least SPF 30 on your face, ears, neck and V of the chest daily. Wear sunscreen on other areas of the body if those areas are exposed to the sun throughout the day. Sunscreens can contain physical and/or chemical blockers. Physical blockers are less likely to clog pores, these include zinc oxide and titanium dioxide. Reapply every two hour and after swimming. Sunscreen examples include Neutrogena, CeraVe, Blue Lizard, Elta MD and many others.    Proper skin care from [facility]:    -Eliminate harsh soaps as they strip the natural oils from the skin, often resulting in dry itchy skin ( i.e. Dial, Zest, [place] Spring)  -Use mild soaps such as Cetaphil or Dove Sensitive Skin in the shower. You do not need to use soap on arms, legs, and trunk every time you shower unless visibly soiled.   -Avoid hot or cold showers.  -After showering, lightly dry off and apply moisturizing within 2-3 minutes. This will help trap moisture in the skin.    -Aggressive use of a moisturizer at least 1-2 times a day to the entire body (including -Vanicream, Cetaphil, Aquaphor or Cerave) and moisturize hands after every washing.  -We recommend using moisturizers that come in a tub that needs to be scooped out, not a pump. This has more of an oil base. It will hold moisture in your skin much better than a water base moisturizer. The above recommended are non-pore clogging.         It was a pleasure speaking with Jenae Villeda today.       Follow up in 2-3 years for FBE        Again, thank you for allowing me to participate in the care of your patient.        Sincerely,        Jenise Monson PA-C

## 2021-09-11 NOTE — PATIENT INSTRUCTIONS
Patient instructions sent through mail, since this was a virtual visit.  See letters tab.    Perla Brown MS, [facility]  Genetic Counselor  Ph: [phone]  Pager: [phone]     The patient is a 55y Female complaining of dizziness.

## 2021-09-19 ENCOUNTER — HEALTH MAINTENANCE LETTER (OUTPATIENT)
Age: 36
End: 2021-09-19

## 2021-09-27 ENCOUNTER — LAB (OUTPATIENT)
Dept: LAB | Facility: CLINIC | Age: 36
End: 2021-09-27
Payer: COMMERCIAL

## 2021-09-27 DIAGNOSIS — Z83.719 FAMILY HISTORY OF COLONIC POLYPS: ICD-10-CM

## 2021-09-27 DIAGNOSIS — Z80.1 FAMILY HISTORY OF LUNG CANCER: ICD-10-CM

## 2021-09-27 DIAGNOSIS — Z80.3 FAMILY HISTORY OF MALIGNANT NEOPLASM OF BREAST: ICD-10-CM

## 2021-09-27 DIAGNOSIS — Z80.42 FAMILY HISTORY OF PROSTATE CANCER: ICD-10-CM

## 2021-09-27 PROCEDURE — 36415 COLL VENOUS BLD VENIPUNCTURE: CPT

## 2021-10-11 LAB
Lab: 8824
PERFORMING LABORATORY: NORMAL
TEST NAME: NORMAL

## 2021-11-03 ENCOUNTER — VIRTUAL VISIT (OUTPATIENT)
Dept: ONCOLOGY | Facility: CLINIC | Age: 36
End: 2021-11-03
Attending: GENETIC COUNSELOR, MS
Payer: COMMERCIAL

## 2021-11-03 DIAGNOSIS — Z83.719 FAMILY HISTORY OF COLONIC POLYPS: ICD-10-CM

## 2021-11-03 DIAGNOSIS — Z80.3 FAMILY HISTORY OF MALIGNANT NEOPLASM OF BREAST: Primary | ICD-10-CM

## 2021-11-03 DIAGNOSIS — Z80.42 FAMILY HISTORY OF PROSTATE CANCER: ICD-10-CM

## 2021-11-03 DIAGNOSIS — Z80.1 FAMILY HISTORY OF LUNG CANCER: ICD-10-CM

## 2021-11-03 PROCEDURE — 999N000069 HC STATISTIC GENETIC COUNSELING, < 16 MIN: Mod: TEL,95 | Performed by: GENETIC COUNSELOR, MS

## 2021-11-03 NOTE — LETTER
"    Cancer Risk Management  Program [facility]  [facility]  [facility]  [facility]  Mailing Address  Cancer Risk Management Program  [address]  [place] [address]    New patient appointments  [phone]  November 3, 2021    Jenae Villeda  [address] [address] [facility] [address]    Dear Jenae,    It was a pleasure speaking with you again on November 3, 2021. Here is a copy of the general \"after visit summary\" from our conversation.  If you have any additional questions, please feel free to call.  You will also later receive in the mail a more detailed clinic note of our conversation as well.            Negative Genetic Test Result    Genetic Testing  You had a blood test that looked at the genetic information in one or more genes associated with increased cancer risk.  The testing looked for any harmful changes that would stop this particular gene from working like it should. If an individual does not have any harmful changes or variants of unknown significance found from their blood test, their genetic test result is reported as negative.       Results  The genetic test did not identify any pathogenic (harmful) changes in the genes that were tested. There are several possible explanations for a negative test result. Without knowing the gene mutation in your family, the cause of the cancer in you or your relatives is still unknown. Your genetic counselor can help interpret the result for you and your relatives. In this case, there are several reasons that may explain the negative test result:    There may be a gene mutation in the family that you did not inherit.     You may have a gene mutation in a different gene that was not included in the test, or has not yet been discovered.     The cancers in " you or your family may be due to a combination of genetic factors and environment (multifactorial/familial).    The cancers in you or your family may be sporadic/random cancers.    There is very small chance that a mutation was not found by current testing methods.  As testing technology evolves over time, it may still be possible to identify a mutation in a gene that was not found on this test.    It is important to note which genes were included in your test. A list of these genes can be found on your test result.    Screening Recommendations  Due to this negative test result, cancer screening recommendations should be based on your personal and family history. This may include increased cancer screening for you and/or your family members. Your genetic counselor and health care provider can help make appropriate recommendations.      Please call us if you have any questions or concerns.   Cancer Risk Management Program [phone]-CANCER ([phone])  ? Dusty Dale, MS, [facility] [phone]  GETACHEW Brown, MS, [facility]  [phone]  [email or web address]  ? Joyce Ta, MS, [facility]  [phone]  ? Rhiannon Morales, MS, [facility] [phone]  ? Vanessa Tomlin, MS, [facility] [phone]  ? Arlene Steele, MS, [facility]  [phone]  ? Norma Monroy, MS  [phone]

## 2021-11-03 NOTE — PATIENT INSTRUCTIONS
Negative Genetic Test Result    Genetic Testing  You had a blood test that looked at the genetic information in one or more genes associated with increased cancer risk.  The testing looked for any harmful changes that would stop this particular gene from working like it should. If an individual does not have any harmful changes or variants of unknown significance found from their blood test, their genetic test result is reported as negative.       Results  The genetic test did not identify any pathogenic (harmful) changes in the genes that were tested. There are several possible explanations for a negative test result. Without knowing the gene mutation in your family, the cause of the cancer in you or your relatives is still unknown. Your genetic counselor can help interpret the result for you and your relatives. In this case, there are several reasons that may explain the negative test result:    There may be a gene mutation in the family that you did not inherit.     You may have a gene mutation in a different gene that was not included in the test, or has not yet been discovered.     The cancers in you or your family may be due to a combination of genetic factors and environment (multifactorial/familial).    The cancers in you or your family may be sporadic/random cancers.    There is very small chance that a mutation was not found by current testing methods.  As testing technology evolves over time, it may still be possible to identify a mutation in a gene that was not found on this test.    It is important to note which genes were included in your test. A list of these genes can be found on your test result.    Screening Recommendations  Due to this negative test result, cancer screening recommendations should be based on your personal and family history. This may include increased cancer screening for you and/or your family members. Your genetic counselor and health care provider can help make  appropriate recommendations.      Please call us if you have any questions or concerns.   Cancer Risk Management Program [phone]-CANCER ([phone])  ? Dusty Dale, MS, [facility] [phone]  X  Perla Brown, MS, [facility]             [phone]    [email or web address]  ? Joyce Ta, MS, [facility]  [phone]  ? Rhiannon Morales, MS, [facility] [phone]  ? Vanessa Tomlin, MS, [facility] [phone]  ? Arlene Steele, MS, [facility]  [phone]  ? Norma Monroy, MS  [phone]

## 2021-11-03 NOTE — LETTER
11/3/2021         RE: Jenae Villeda  [address] [address] [facility] [address]        Dear Colleague,    Thank you for referring your patient, Jenae Villeda, to the [facility]. Please see a copy of my visit note below.    Cancer Risk Management Program Genetic Counseling Note    11/3/2021    Referring Provider: Dr. Jia Ramos    Presenting Information:  Jenae Villeda had a return telephone visit today through the Cancer Risk Management Program, in order to discuss her genetic testing results. Her blood was drawn for this tesitng on 9-.  A CancerNext genetic testing panel was ordered from [facility].  This testing was done because of her family history of breast and other cancer.    Genetic Testing Result: NEGATIVE  Jenae is negative for mutations in the APC, RUBEN, AXIN2, BARD1, BMPR1A, BRCA1, BRCA2, BRIP1, CDH1, CDK4, CDKN2A, CHEK2, DICER1, EPCAM, GREM1, HOXB13, MLH1, MSH2, MSH3, MSH6, MUTYH, NBN, NF1, NTHL1, PALB2, PMS2, POLD1, POLE, PTEN, RAD51C, RAD51D, RECQL, SMAD4, SMARCA4, STK11 and TP53. No mutations were found in any of the 36 genes analyzed. This test involved sequencing and deletion/duplication analysis of all genes with the exceptions of EPCAM and GREM1 (deletions/duplications only) and HOXB13, POLD1 and POLE (sequencing only).    Therefore, genetic testing did not detect an identifiable mutation associated with Hereditary Breast and Ovarian Cancer syndrome (BRCA1, BRCA2), Ohara syndrome (MLH1, MSH2, MSH6, PMS2, EPCAM), Familial Adenomatous Polyposis (APC), Hereditary Diffuse Gastric Cancer (CDH1), Familial Atypical Multiple Mole Melanoma syndrome (CDK4, CDKN2A), Juvenile Polyposis syndrome (BMPR1A, SMAD4), Cowden syndrome (PTEN), Li Fraumeni syndrome (TP53), Peutz-Jeghers syndrome (STK11), MUTYH Associated Polyposis (MUTYH), or Neurofibromatosis type 1 (NF1).    A copy of the test report can be found in the Laboratory tab,  "dated 9-, and named \"LABORATORY MISCELLANEOUS ORDER.\" The report is scanned in as a linked document.    Interpretation:  We discussed several different interpretations of this negative test result:   1. One explanation may be that there is a different gene or combination of genes and environment that are associated with the cancers in this family.  2. Another explanation may be that some of Jenae's relatives did have a mutation in a hereditary cancer gene, but she did not inherit it.  3. There is also a small possibility that there is a mutation in one of these genes, and the testing laboratory could not find it with their current testing methods.       Screening:  Based on this negative test result, it is important for Jenae and her relatives to refer back to the family history for appropriate cancer screening.  We talked about today that since we do not have a genetic explanation for the cancers in Jenae's family, this negative genetic test result does not give us specific breast cancer risk information for Jenae personally.  In this circumstance, we talked about that a few different breast cancer risk models have been used to try to estimate a woman's breast cancer risk, in order to determine which women should consider increased breast cancer surveillance:      - We talked about that the Ji model is based on family history of breast cancer in first and second degree relatives only; we talked about that this model would allow me to estimate breast cancer risk based on her maternal aunt's and paternal grandmother's history of breast cancer.  Jenae reports that her maternal aunt was diagnosed with breast cancer in her 50s and that her paternal grandmother was diagnosed with breast cancer in her 70s; using this information, the Ji tables gives around a 10% estimated lifetime risk of breast cancer.        - We talked about that another model (the HERRERA breast cancer risk calculator) " gives breast cancer risk estimates based on family history, plus some additional risk factors such as breast density, previous breast biopsies, age of menarche/first child, and previous genetic testing results; the HERRERA risk calculator predicted around a 19% lifetime risk for breast cancer for Jenae    When a women's estimated lifetime risk of breast cancer is 20% or higher based on at least one of these models, it is generally recommended that they have additional conversations about the options for increased breast cancer surveillance.  We talked about that, based on the information we have available at this time, Jenae's estimated lifetime breast cancer risk does not meet this threshold for which increased breast cancer screening is recommended.   Given that the earliest breast cancer in Jenae's family is a diagnosis in the 50s, we talked about that routine breast cancer screening via imaging could reasonably begin for Jenae at age 40.       We talked about that these breast cancer recommendations could change over time, and so it is important for Jenae to be in continued conversations with her health care providers about her recommended cancer screening.  Additionally, if there are changes to Jenae's personal medical history or family history, these breast cancer risk models could be reassessed to make sure that Jenae's estimated breast cancer risk does not increased to above 20%, which would alter her screening recommendations.  (For example, if Jenae is found to have dense breast tissue after age 40, this could influence her breast cancer risk estimate from HERRERA.)    We also talked about that Jenae's mother is reported to have had a pre-cancerous colon polyp at age 56 and that her maternal grandfather had a history of lots of colon polyps.  Give this history, we talked about that Jenae should talk with her primary cancer provider about starting her screening  colonoscopies by age 45.  (This specific recommendation could be reassessed if Jenae's mother develops more pre-cancerous colon polyps.)    Other population cancer screening options, such as those recommended by the American Cancer Society and the National Comprehensive Cancer Network (NCCN), are also appropriate for Jenae and her family. Final screening recommendations should be made in conjunction with each individual's primary care provider.    Inheritance:  We reviewed the autosomal dominant inheritance of mutations in most of these hereditary cancer genes. We discussed that for the genes that Jenae tested negative for, we would expect that Jenae did not pass on an identifiable mutation in these genes to her children. Mutations in these genes do not skip generations.      Additional Testing Considerations:  It is possible Jenae does carry a gene or combination of genes and environment that increase her risk for cancer that was not identifiable through this particular genetic testing panel. Jenae is encouraged to contact me in the future if she wants to know if there is additional genetic testing that might be available/indicated for her then or if she wishes to readdress larger gene panel options in the future.     Although Jenae's genetic testing result was negative, other relatives may still carry a gene mutation associated with an increased risk for cancer. Genetic counseling is recommended for other relatives closer in biological relationship to the individuals in the family with cancer, in order to discuss genetic testing options. If any of these relatives do pursue genetic testing, Jenae is encouraged to contact me so that we may review the impact of their test results on her.    Summary:  We do not have an explanation for Jenae's family history of cancer. While no genetic changes were identified, Jenae may still be at risk for certain cancers due to family history,  environmental factors, or other genetic causes not identified by this test.  Because of that, it is important that she continue with cancer screening based on her personal and family history as discussed above.    Genetic testing is rapidly advancing, and new cancer susceptibility genes will most likely be identified in the future. Therefore, I encouraged Jenae to contact me annually or if there are changes in her personal or family history. This may change how we assess her cancer risk, screening, and the testing we would offer.    Plan:  1.  I released a copy of Jenae's genetic testing report to her through the secure [employer] Portal.  She was also sent a handout summarizing negative genetic test results (see after visit summary).  2. Jenae plans to follow-up with her primary care provider as previously recommended.  3. Jenae should contact me annually, or sooner if her family history changes, and she would like to know if there are additional screening or testing recommendations at that time.    If Jenae has any further questions, I encouraged her to contact me at [phone] or via [employer] or through email: [email or web address].    Perla Brown MS, [facility]  Genetic Counselor    Time spent over the phone: 11 minutes

## 2021-11-03 NOTE — PROGRESS NOTES
"Cancer Risk Management Program Genetic Counseling Note    11/3/2021    Referring Provider: Dr. Jia Ramos    Presenting Information:  Jenae Villeda had a return telephone visit today through the Cancer Risk Management Program, in order to discuss her genetic testing results. Her blood was drawn for this tesitng on 9-.  A CancerNext genetic testing panel was ordered from [employer].  This testing was done because of her family history of breast and other cancer.    Genetic Testing Result: NEGATIVE  Jenae is negative for mutations in the APC, RUBEN, AXIN2, BARD1, BMPR1A, BRCA1, BRCA2, BRIP1, CDH1, CDK4, CDKN2A, CHEK2, DICER1, EPCAM, GREM1, HOXB13, MLH1, MSH2, MSH3, MSH6, MUTYH, NBN, NF1, NTHL1, PALB2, PMS2, POLD1, POLE, PTEN, RAD51C, RAD51D, RECQL, SMAD4, SMARCA4, STK11 and TP53. No mutations were found in any of the 36 genes analyzed. This test involved sequencing and deletion/duplication analysis of all genes with the exceptions of EPCAM and GREM1 (deletions/duplications only) and HOXB13, POLD1 and POLE (sequencing only).    Therefore, genetic testing did not detect an identifiable mutation associated with Hereditary Breast and Ovarian Cancer syndrome (BRCA1, BRCA2), Ohara syndrome (MLH1, MSH2, MSH6, PMS2, EPCAM), Familial Adenomatous Polyposis (APC), Hereditary Diffuse Gastric Cancer (CDH1), Familial Atypical Multiple Mole Melanoma syndrome (CDK4, CDKN2A), Juvenile Polyposis syndrome (BMPR1A, SMAD4), Cowden syndrome (PTEN), Li Fraumeni syndrome (TP53), Peutz-Jeghers syndrome (STK11), MUTYH Associated Polyposis (MUTYH), or Neurofibromatosis type 1 (NF1).    A copy of the test report can be found in the Laboratory tab, dated 9-, and named \"LABORATORY MISCELLANEOUS ORDER.\" The report is scanned in as a linked document.    Interpretation:  We discussed several different interpretations of this negative test result:   1. One explanation may be that there is a different gene or combination " of genes and environment that are associated with the cancers in this family.  2. Another explanation may be that some of Jenae's relatives did have a mutation in a hereditary cancer gene, but she did not inherit it.  3. There is also a small possibility that there is a mutation in one of these genes, and the testing laboratory could not find it with their current testing methods.       Screening:  Based on this negative test result, it is important for Jenae and her relatives to refer back to the family history for appropriate cancer screening.  We talked about today that since we do not have a genetic explanation for the cancers in Jenae's family, this negative genetic test result does not give us specific breast cancer risk information for Jenae personally.  In this circumstance, we talked about that a few different breast cancer risk models have been used to try to estimate a woman's breast cancer risk, in order to determine which women should consider increased breast cancer surveillance:      - We talked about that the Ji model is based on family history of breast cancer in first and second degree relatives only; we talked about that this model would allow me to estimate breast cancer risk based on her maternal aunt's and paternal grandmother's history of breast cancer.  Jenae reports that her maternal aunt was diagnosed with breast cancer in her 50s and that her paternal grandmother was diagnosed with breast cancer in her 70s; using this information, the Ji tables gives around a 10% estimated lifetime risk of breast cancer.        - We talked about that another model (the HERRERA breast cancer risk calculator) gives breast cancer risk estimates based on family history, plus some additional risk factors such as breast density, previous breast biopsies, age of menarche/first child, and previous genetic testing results; the HERRERA risk calculator predicted around a 19% lifetime risk for  breast cancer for Jenae    When a women's estimated lifetime risk of breast cancer is 20% or higher based on at least one of these models, it is generally recommended that they have additional conversations about the options for increased breast cancer surveillance.  We talked about that, based on the information we have available at this time, Jenae's estimated lifetime breast cancer risk does not meet this threshold for which increased breast cancer screening is recommended.   Given that the earliest breast cancer in Jenae's family is a diagnosis in the 50s, we talked about that routine breast cancer screening via imaging could reasonably begin for Jenae at age 40.       We talked about that these breast cancer recommendations could change over time, and so it is important for Jenae to be in continued conversations with her health care providers about her recommended cancer screening.  Additionally, if there are changes to Jenae's personal medical history or family history, these breast cancer risk models could be reassessed to make sure that Jenae's estimated breast cancer risk does not increased to above 20%, which would alter her screening recommendations.  (For example, if Jenae is found to have dense breast tissue after age 40, this could influence her breast cancer risk estimate from HERRERA.)    We also talked about that Jenae's mother is reported to have had a pre-cancerous colon polyp at age 56 and that her maternal grandfather had a history of lots of colon polyps.  Give this history, we talked about that Jenae should talk with her primary cancer provider about starting her screening colonoscopies by age 45.  (This specific recommendation could be reassessed if Jenae's mother develops more pre-cancerous colon polyps.)    Other population cancer screening options, such as those recommended by the American Cancer Society and the National Comprehensive Cancer Network  (NCCN), are also appropriate for Jenae and her family. Final screening recommendations should be made in conjunction with each individual's primary care provider.    Inheritance:  We reviewed the autosomal dominant inheritance of mutations in most of these hereditary cancer genes. We discussed that for the genes that Jenae tested negative for, we would expect that Jenae did not pass on an identifiable mutation in these genes to her children. Mutations in these genes do not skip generations.      Additional Testing Considerations:  It is possible Jenae does carry a gene or combination of genes and environment that increase her risk for cancer that was not identifiable through this particular genetic testing panel. Jenae is encouraged to contact me in the future if she wants to know if there is additional genetic testing that might be available/indicated for her then or if she wishes to readdress larger gene panel options in the future.     Although Jenae's genetic testing result was negative, other relatives may still carry a gene mutation associated with an increased risk for cancer. Genetic counseling is recommended for other relatives closer in biological relationship to the individuals in the family with cancer, in order to discuss genetic testing options. If any of these relatives do pursue genetic testing, Jenae is encouraged to contact me so that we may review the impact of their test results on her.    Summary:  We do not have an explanation for Jenae's family history of cancer. While no genetic changes were identified, Jenae may still be at risk for certain cancers due to family history, environmental factors, or other genetic causes not identified by this test.  Because of that, it is important that she continue with cancer screening based on her personal and family history as discussed above.    Genetic testing is rapidly advancing, and new cancer susceptibility genes  will most likely be identified in the future. Therefore, I encouraged Jenae to contact me annually or if there are changes in her personal or family history. This may change how we assess her cancer risk, screening, and the testing we would offer.    Plan:  1.  I released a copy of Jenae's genetic testing report to her through the secure [employer] Portal.  She was also sent a handout summarizing negative genetic test results (see after visit summary).  2. Jenae plans to follow-up with her primary care provider as previously recommended.  3. Jenae should contact me annually, or sooner if her family history changes, and she would like to know if there are additional screening or testing recommendations at that time.    If Jenae has any further questions, I encouraged her to contact me at [phone] or via bookletmobile or through email: [email or web address].    Perla Brown MS, [facility]  Genetic Counselor    Time spent over the phone: 11 minutes

## 2021-11-03 NOTE — LETTER
Cancer Risk Management  Program Locations    [facility]  [facility]  [facility]  Mailing Address  Cancer Risk Management Program  [address]  [place] [address]    New patient appointments  [phone]  November 7, 2021    Jenae Villeda  [address] [facility] [id number]      Dear Jenae,    It was a pleasure speaking with you on the phone on 11-3-2021. Here is a copy of the progress note from our discussion. If you have any additional questions, please feel free to call.    Cancer Risk Management Program Genetic Counseling Note    11/3/2021    Referring Provider: Dr. Jia Ramos    Presenting Information:  Jenae Villeda had a return telephone visit today through the Cancer Risk Management Program, in order to discuss her genetic testing results. Her blood was drawn for this tesitng on 9-.  A CancerNext genetic testing panel was ordered from [employer].  This testing was done because of her family history of breast and other cancer.    Genetic Testing Result: NEGATIVE  Jenae is negative for mutations in the APC, RUBEN, AXIN2, BARD1, BMPR1A, BRCA1, BRCA2, BRIP1, CDH1, CDK4, CDKN2A, CHEK2, DICER1, EPCAM, GREM1, HOXB13, MLH1, MSH2, MSH3, MSH6, MUTYH, NBN, NF1, NTHL1, PALB2, PMS2, POLD1, POLE, PTEN, RAD51C, RAD51D, RECQL, SMAD4, SMARCA4, STK11 and TP53. No mutations were found in any of the 36 genes analyzed. This test involved sequencing and deletion/duplication analysis of all genes with the exceptions of EPCAM and GREM1 (deletions/duplications only) and HOXB13, POLD1 and POLE (sequencing only).    Therefore, genetic testing did not detect an identifiable mutation associated with Hereditary Breast and Ovarian Cancer syndrome (BRCA1, BRCA2), Ohara syndrome (MLH1, MSH2, MSH6,  "PMS2, EPCAM), Familial Adenomatous Polyposis (APC), Hereditary Diffuse Gastric Cancer (CDH1), Familial Atypical Multiple Mole Melanoma syndrome (CDK4, CDKN2A), Juvenile Polyposis syndrome (BMPR1A, SMAD4), Cowden syndrome (PTEN), Li Fraumeni syndrome (TP53), Peutz-Jeghers syndrome (STK11), MUTYH Associated Polyposis (MUTYH), or Neurofibromatosis type 1 (NF1).    A copy of the test report can be found in the Laboratory tab, dated 9-, and named \"LABORATORY MISCELLANEOUS ORDER.\" The report is scanned in as a linked document.    Interpretation:  We discussed several different interpretations of this negative test result:   1. One explanation may be that there is a different gene or combination of genes and environment that are associated with the cancers in this family.  2. Another explanation may be that some of Jenae's relatives did have a mutation in a hereditary cancer gene, but she did not inherit it.  3. There is also a small possibility that there is a mutation in one of these genes, and the testing laboratory could not find it with their current testing methods.       Screening:  Based on this negative test result, it is important for Jenae and her relatives to refer back to the family history for appropriate cancer screening.  We talked about today that since we do not have a genetic explanation for the cancers in Jenae's family, this negative genetic test result does not give us specific breast cancer risk information for Jenae personally.  In this circumstance, we talked about that a few different breast cancer risk models have been used to try to estimate a woman's breast cancer risk, in order to determine which women should consider increased breast cancer surveillance:      - We talked about that the Ji model is based on family history of breast cancer in first and second degree relatives only; we talked about that this model would allow me to estimate breast cancer risk based on " her maternal aunt's and paternal grandmother's history of breast cancer.  Jenae reports that her maternal aunt was diagnosed with breast cancer in her 50s and that her paternal grandmother was diagnosed with breast cancer in her 70s; using this information, the Ji tables gives around a 10% estimated lifetime risk of breast cancer.        - We talked about that another model (the HERRERA breast cancer risk calculator) gives breast cancer risk estimates based on family history, plus some additional risk factors such as breast density, previous breast biopsies, age of menarche/first child, and previous genetic testing results; the HERRERA risk calculator predicted around a 19% lifetime risk for breast cancer for Jenae    When a women's estimated lifetime risk of breast cancer is 20% or higher based on at least one of these models, it is generally recommended that they have additional conversations about the options for increased breast cancer surveillance.  We talked about that, based on the information we have available at this time, Jenae's estimated lifetime breast cancer risk does not meet this threshold for which increased breast cancer screening is recommended.   Given that the earliest breast cancer in Jenae's family is a diagnosis in the 50s, we talked about that routine breast cancer screening via imaging could reasonably begin for Jenae at age 40.       We talked about that these breast cancer recommendations could change over time, and so it is important for Jenae to be in continued conversations with her health care providers about her recommended cancer screening.  Additionally, if there are changes to Jenae's personal medical history or family history, these breast cancer risk models could be reassessed to make sure that Jenae's estimated breast cancer risk does not increased to above 20%, which would alter her screening recommendations.  (For example, if Jenae is found to  have dense breast tissue after age 40, this could influence her breast cancer risk estimate from HERRERA.)    We also talked about that Jenae's mother is reported to have had a pre-cancerous colon polyp at age 56 and that her maternal grandfather had a history of lots of colon polyps.  Give this history, we talked about that Jenae should talk with her primary cancer provider about starting her screening colonoscopies by age 45.  (This specific recommendation could be reassessed if Jenae's mother develops more pre-cancerous colon polyps.)    Other population cancer screening options, such as those recommended by the American Cancer Society and the National Comprehensive Cancer Network (NCCN), are also appropriate for Jenae and her family. Final screening recommendations should be made in conjunction with each individual's primary care provider.    Inheritance:  We reviewed the autosomal dominant inheritance of mutations in most of these hereditary cancer genes. We discussed that for the genes that Jenae tested negative for, we would expect that Jenae did not pass on an identifiable mutation in these genes to her children. Mutations in these genes do not skip generations.      Additional Testing Considerations:  It is possible Jenae does carry a gene or combination of genes and environment that increase her risk for cancer that was not identifiable through this particular genetic testing panel. Jenae is encouraged to contact me in the future if she wants to know if there is additional genetic testing that might be available/indicated for her then or if she wishes to readdress larger gene panel options in the future.     Although Jenae's genetic testing result was negative, other relatives may still carry a gene mutation associated with an increased risk for cancer. Genetic counseling is recommended for other relatives closer in biological relationship to the individuals in the family  with cancer, in order to discuss genetic testing options. If any of these relatives do pursue genetic testing, Jenae is encouraged to contact me so that we may review the impact of their test results on her.    Summary:  We do not have an explanation for Jenae's family history of cancer. While no genetic changes were identified, Jenae may still be at risk for certain cancers due to family history, environmental factors, or other genetic causes not identified by this test.  Because of that, it is important that she continue with cancer screening based on her personal and family history as discussed above.    Genetic testing is rapidly advancing, and new cancer susceptibility genes will most likely be identified in the future. Therefore, I encouraged Jenae to contact me annually or if there are changes in her personal or family history. This may change how we assess her cancer risk, screening, and the testing we would offer.    Plan:  1.  I released a copy of Jenae's genetic testing report to her through the secure [employer] Portal.  She was also sent a handout summarizing negative genetic test results (see after visit summary).  2. Jenae plans to follow-up with her primary care provider as previously recommended.  3. Jenae should contact me annually, or sooner if her family history changes, and she would like to know if there are additional screening or testing recommendations at that time.    If Jenae has any further questions, I encouraged her to contact me at [phone] or via [employer] or through email: [email or web address].    Perla Brown MS, [facility]  Genetic Counselor    Time spent over the phone: 11 minutes

## 2022-03-06 ENCOUNTER — HEALTH MAINTENANCE LETTER (OUTPATIENT)
Age: 37
End: 2022-03-06

## 2022-04-28 ENCOUNTER — E-VISIT (OUTPATIENT)
Dept: URGENT CARE | Facility: CLINIC | Age: 37
End: 2022-04-28
Payer: COMMERCIAL

## 2022-04-28 DIAGNOSIS — B96.89 ACUTE BACTERIAL SINUSITIS: Primary | ICD-10-CM

## 2022-04-28 DIAGNOSIS — J01.90 ACUTE BACTERIAL SINUSITIS: Primary | ICD-10-CM

## 2022-04-28 PROCEDURE — 99421 OL DIG E/M SVC 5-10 MIN: CPT | Performed by: FAMILY MEDICINE

## 2022-04-28 NOTE — PATIENT INSTRUCTIONS
Dear Jenae Villeda    After reviewing your responses, I've been able to diagnose you with?a sinus infection caused by bacteria.?     Based on your responses and diagnosis, I have prescribed Augmentin to treat your symptoms. I have sent this to your pharmacy.?     It is also important to stay well hydrated, get lots of rest and take over-the-counter decongestants,?tylenol?or ibuprofen if you?are able to?take those medications per your primary care provider to help relieve discomfort.?     It is important that you take?all of?your prescribed medication even if your symptoms are improving after a few doses.? Taking?all of?your medicine helps prevent the symptoms from returning.?     If your symptoms worsen, you develop severe headache, vomiting, high fever (>102), or are not improving in 7 days, please contact your primary care provider for an appointment or visit any of our convenient Walk-in Care or Urgent Care Centers to be seen which can be found on our website?here.?     Thanks again for choosing?us?as your health care partner,?   ?  Trice Bradshaw MD?

## 2022-05-16 ENCOUNTER — VIRTUAL VISIT (OUTPATIENT)
Dept: FAMILY MEDICINE | Facility: CLINIC | Age: 37
End: 2022-05-16
Payer: COMMERCIAL

## 2022-05-16 DIAGNOSIS — R05.9 COUGH: Primary | ICD-10-CM

## 2022-05-16 PROCEDURE — 99213 OFFICE O/P EST LOW 20 MIN: CPT | Mod: GT | Performed by: PHYSICIAN ASSISTANT

## 2022-05-16 RX ORDER — ALBUTEROL SULFATE 90 UG/1
2 AEROSOL, METERED RESPIRATORY (INHALATION) EVERY 4 HOURS PRN
Qty: 18 G | Refills: 0 | Status: SHIPPED | OUTPATIENT
Start: 2022-05-16 | End: 2022-11-18

## 2022-05-16 RX ORDER — CODEINE PHOSPHATE AND GUAIFENESIN 10; 100 MG/5ML; MG/5ML
1-2 SOLUTION ORAL
Qty: 118 ML | Refills: 0 | Status: SHIPPED | OUTPATIENT
Start: 2022-05-16 | End: 2022-11-18

## 2022-05-16 NOTE — PROGRESS NOTES
"Jenae Villeda is a 36 year old who is being evaluated via a billable video visit.      How would you like to obtain your AVS? MyChart  If the video visit is dropped, the invitation should be resent by: Text to cell phone: [phone]  Will anyone else be joining your video visit? No    Video Start Time: 4pm    Assessment & Plan     Jenae was seen today for illness.    Diagnoses and all orders for this visit:    Cough  -     guaiFENesin-codeine (ROBITUSSIN AC) 100-10 MG/5ML solution; Take 5-10 mLs by mouth nightly as needed for cough  -     albuterol (PROAIR HFA/PROVENTIL HFA/VENTOLIN HFA) 108 (90 Base) MCG/ACT inhaler; Inhale 2 puffs into the lungs every 4 hours as needed for shortness of breath / dyspnea or wheezing    Reviewed that constitution and duration still most c/w viral process. Pt requesting codeine cough suppressant to help with sleep at night and feel this is reasonable. Risks/use reviewed. Given report of day with wheezing and possible elements of bronchospasm advised use of albuterol inhaler as well. If not improving over next week or worsening with any red flag features advised in office visit for formal exam/vitals/consisderation to imaging if needed. Patient in agreement with plan.     Return in about 1 week (around 5/23/2022) for if not improving as expected or worsening.    Norma Melendrez PA-C  [facility]   Jenae Villeda is a 36 year old who presents for the following health issues.    HPI   Acute Illness - Covid 4/11/22 (brain fog, HA, body aches, chills, no fever, nasal congestion - sx progressed for more HA and congestion so did e-visit and treated for Sinus Inf 4/28/22 with augmentin - reports next day she felt \"100% better\". Went to [place] 4/29 and there for 1 week until 5/6 and \"felt great the whole time I was there\". Got back that weekend, a little jet-legged, but didn't feel sick at all. Finished abx 5/6/22 since missed 1 dose (7-day " course). Returned from [place] on 5/6/22.  Flu Symptoms started 5/9/22 with tickle in her throat, watery eyes, then woke-up Tuesday morning with cough, sore throat, lymphadenopathy, congestion, body aches, abdominal cramping (also had period same day), diarrhea for 2-3 days then started to improve Thursday. No fever. Severity of cough worsening 5/12/22 with wanting to cough constantly - if laughs, talks, moves will cough. In morning has deep cough and a little productive then for remainder of the day is more dry. No difficulty breathing, but had one day where she felt a bit wheezy.   Non-smoker  Prior to COVID would get a cough like this 2-3x per year where she'd get codeine cough syrup to help her sleep and would like to try this again. Hx of bronchitis, but no pneumonia.   Did home test for COVID19 just to be on the safe side and it was negative.  Fully immunized and boosted.      Acute illness concerns: Cough  Onset/Duration: on going x 1 month  Symptoms:  Fever: no   Chills/Sweats: no  Headache (location?): YES  Sinus Pressure: no  Conjunctivitis:  no  Ear Pain: no  Rhinorrhea: YES  Congestion: YES- chest and nasal depending on time of day  Sore Throat: no  Cough: YES-non-productive during the day, productive of clear sputum in the morning, worsening over time  Wheeze: no  Decreased Appetite: no  Nausea: no  Vomiting: no  Diarrhea: no  Dysuria/Freq.: no  Dysuria or Hematuria: no  Fatigue/Achiness: YES, do to not sleeping very well from the cough  Sick/Strep Exposure: no  Therapies tried and outcome: [facility]    Review of Systems   Constitutional, HEENT, cardiovascular, pulmonary, gi and gu systems are negative, except as otherwise noted.      Objective           Vitals:  No vitals were obtained today due to virtual visit.    Physical Exam   GENERAL: Healthy, alert and no distress  EYES: Eyes grossly normal to inspection.  No discharge or erythema, or obvious scleral/conjunctival  abnormalities.  RESP: No audible wheeze, but does have periodic cough, no visible cyanosis.  No visible retractions or increased work of breathing.     SKIN: Visible skin clear. No significant rash, abnormal pigmentation or lesions.  NEURO: Cranial nerves grossly intact.  Mentation and speech appropriate for age.  PSYCH: Mentation appears normal, affect normal/bright, judgement and insight intact, normal speech and appearance well-groomed.                Video-Visit Details    Type of service:  Video Visit    Video End Time:4:24 PM    Originating Location (pt. Location): Home    Distant Location (provider location):  [facility]     Platform used for Video Visit: Quintin

## 2022-05-18 ENCOUNTER — ANCILLARY PROCEDURE (OUTPATIENT)
Dept: GENERAL RADIOLOGY | Facility: CLINIC | Age: 37
End: 2022-05-18
Attending: NURSE PRACTITIONER
Payer: COMMERCIAL

## 2022-05-18 ENCOUNTER — OFFICE VISIT (OUTPATIENT)
Dept: FAMILY MEDICINE | Facility: CLINIC | Age: 37
End: 2022-05-18
Payer: COMMERCIAL

## 2022-05-18 VITALS
HEART RATE: 79 BPM | RESPIRATION RATE: 12 BRPM | TEMPERATURE: 98.9 F | HEIGHT: 63 IN | DIASTOLIC BLOOD PRESSURE: 72 MMHG | WEIGHT: 137 LBS | SYSTOLIC BLOOD PRESSURE: 122 MMHG | BODY MASS INDEX: 24.27 KG/M2 | OXYGEN SATURATION: 98 %

## 2022-05-18 DIAGNOSIS — R05.9 COUGH: Primary | ICD-10-CM

## 2022-05-18 DIAGNOSIS — R05.9 COUGH: ICD-10-CM

## 2022-05-18 PROCEDURE — 71046 X-RAY EXAM CHEST 2 VIEWS: CPT | Mod: TC | Performed by: RADIOLOGY

## 2022-05-18 PROCEDURE — 99213 OFFICE O/P EST LOW 20 MIN: CPT | Performed by: NURSE PRACTITIONER

## 2022-05-18 RX ORDER — BENZONATATE 200 MG/1
200 CAPSULE ORAL 3 TIMES DAILY PRN
Qty: 30 CAPSULE | Refills: 0 | Status: SHIPPED | OUTPATIENT
Start: 2022-05-18 | End: 2022-11-18

## 2022-05-18 RX ORDER — AZITHROMYCIN 250 MG/1
TABLET, FILM COATED ORAL
Qty: 6 TABLET | Refills: 0 | Status: SHIPPED | OUTPATIENT
Start: 2022-05-18 | End: 2022-05-23

## 2022-05-18 ASSESSMENT — PAIN SCALES - GENERAL: PAINLEVEL: MODERATE PAIN (4)

## 2022-05-18 NOTE — PROGRESS NOTES
Assessment & Plan     Jenae was seen today for recheck.    Diagnoses and all orders for this visit:    Cough    Patient education completed regarding likely viral cause, typical course, symptomatic treatment and when to follow-up.   Sudafed, 30-60 mg every 6-8 hours as needed for ear congestion.   Start an antihistamine - Cetirizine or Loratadine.    Flonase - 1-2 sprays each nostril once daily.    Sinus saline rinses - twice daily and as needed.    With no improvement or worsening, may start Azithromycin regimen.      -     XR Chest 2 Views; Future - no acute pulmonary findings  -     benzonatate (TESSALON) 200 MG capsule; Take 1 capsule (200 mg) by mouth 3 times daily as needed for cough  -     azithromycin (ZITHROMAX) 250 MG tablet; Take 2 tablets (500 mg) by mouth daily for 1 day, THEN 1 tablet (250 mg) daily for 4 days.      Return in about 2 weeks (around 6/1/2022) for No improvement or sooner with worsening symptoms.    Stephany Albert, APRN [facility]   Jenae Villeda is a 36 year old who presents for the following health issues:      Pt had Virtual visit on 5/16/22 with Norma Melendrez PA-C for Cough Sx   At home Covid test was neg last week      History of Present Illness       Reason for visit:  Cough, new ear pain  Symptom onset:  1-2 weeks ago  Symptoms include:  Cough, pressure in ears, headache  Symptom intensity:  Moderate  Symptom progression:  Worsening  Had these symptoms before:  No  What makes it worse:  Talking, eating, laying down  What makes it better:  Drinking water, tea    She eats 2-3 servings of fruits and vegetables daily.She consumes 0 sweetened beverage(s) daily.She exercises with enough effort to increase her heart rate 20 to 29 minutes per day.  She exercises with enough effort to increase her heart rate 3 or less days per week.   She is taking medications regularly.     History of COVID-19 - 4/11/22.    4/28/22 - Augmentin for  "sinus infection.      Onset of symptoms on 4/9/22, headache, body aches, cramping - acute onset of URI symptoms plus menstrual cycle.   Hot/cold, cough - no fever.  Worse symptoms lasted 3 days and then cough worsened.  Was feeling better overall, but cough has continued.  Difficulty with sleeping at night due to cough.  Virtual visit on 5/16/22.  Robitussin/Codeine and inhaler were not helpful.  +Fatigue due to not being able to sleep well.  Then had worsening headache, ear congestion/pain.  Ears will intermittently clear and then get congested again.  Was able to sleep better last night due to taking 10 ml of Robitussin with codeine.  Intermittently productive and non-productive.    No shortness of breath or wheezing.       Review of Systems   Constitutional, HEENT, cardiovascular, pulmonary, gi and gu systems are negative, except as otherwise noted.      Objective    /72   Pulse 79   Temp 98.9  F (37.2  C) (Tympanic)   Resp 12   Ht 1.6 m (5' 3\")   Wt 62.1 kg (137 lb)   LMP  (Approximate)   SpO2 98%   BMI 24.27 kg/m    Body mass index is 24.27 kg/m .     Physical Exam     GENERAL: healthy, alert and no distress  EYES: Eyes grossly normal to inspection  HENT: ear canals normal,  Bilateral TM's slightly opaque, no erythema, non-bulging, nose and mouth without ulcers or lesions  NECK: no adenopathy, no asymmetry  RESP: lungs clear to auscultation - no rales, rhonchi or wheezes  CV: regular rate and rhythm, normal S1 S2  PSYCH: mentation appears normal, affect normal/bright        "

## 2022-05-18 NOTE — PATIENT INSTRUCTIONS
Sudafed, 30-60 mg every 6-8 hours as needed for ear congestion.     Start an antihistamine - Cetirizine or Loratadine.      Flonase - 1-2 sprays each nostril once daily.      Sinus saline rinses - twice daily and as needed.

## 2022-05-19 NOTE — RESULT ENCOUNTER NOTE
Dear Jenae Villeda,     The radiologist agreed and noted no acute pulmonary abnormalities.      Here is their reading:      FINDINGS: Heart size and pulmonary vascularity are within normal  limits. The lungs are clear. No pneumothorax or pleural effusion.        IMPRESSION: No radiographic evidence of acute chest abnormality.        Please send a [employer] message or call [phone]  if you have any questions.      Stephany Albert, APRN, CNP  [facility]

## 2022-11-18 ENCOUNTER — OFFICE VISIT (OUTPATIENT)
Dept: FAMILY MEDICINE | Facility: CLINIC | Age: 37
End: 2022-11-18
Payer: COMMERCIAL

## 2022-11-18 VITALS
SYSTOLIC BLOOD PRESSURE: 120 MMHG | BODY MASS INDEX: 24.88 KG/M2 | RESPIRATION RATE: 16 BRPM | DIASTOLIC BLOOD PRESSURE: 70 MMHG | WEIGHT: 140.4 LBS | TEMPERATURE: 97.1 F | OXYGEN SATURATION: 98 % | HEIGHT: 63 IN | HEART RATE: 81 BPM

## 2022-11-18 DIAGNOSIS — K58.0 IRRITABLE BOWEL SYNDROME WITH DIARRHEA: ICD-10-CM

## 2022-11-18 DIAGNOSIS — Z80.3 FAMILY HISTORY OF BREAST CANCER: ICD-10-CM

## 2022-11-18 DIAGNOSIS — Z11.59 NEED FOR HEPATITIS C SCREENING TEST: ICD-10-CM

## 2022-11-18 DIAGNOSIS — Z83.719 FAMILY HISTORY OF COLONIC POLYPS: ICD-10-CM

## 2022-11-18 DIAGNOSIS — D22.9 MULTIPLE PIGMENTED NEVI: ICD-10-CM

## 2022-11-18 DIAGNOSIS — Z00.00 ROUTINE GENERAL MEDICAL EXAMINATION AT A HEALTH CARE FACILITY: Primary | ICD-10-CM

## 2022-11-18 DIAGNOSIS — Z13.6 CARDIOVASCULAR SCREENING; LDL GOAL LESS THAN 160: ICD-10-CM

## 2022-11-18 DIAGNOSIS — K04.7 TOOTH ABSCESS: ICD-10-CM

## 2022-11-18 LAB
ALBUMIN SERPL BCG-MCNC: 4.7 G/DL (ref 3.5–5.2)
ALP SERPL-CCNC: 63 U/L (ref 35–104)
ALT SERPL W P-5'-P-CCNC: 30 U/L (ref 10–35)
ANION GAP SERPL CALCULATED.3IONS-SCNC: 11 MMOL/L (ref 7–15)
AST SERPL W P-5'-P-CCNC: 32 U/L (ref 10–35)
BILIRUB SERPL-MCNC: 0.4 MG/DL
BUN SERPL-MCNC: 14.8 MG/DL (ref 6–20)
CALCIUM SERPL-MCNC: 9.1 MG/DL (ref 8.6–10)
CHLORIDE SERPL-SCNC: 104 MMOL/L (ref 98–107)
CREAT SERPL-MCNC: 0.73 MG/DL (ref 0.51–0.95)
DEPRECATED HCO3 PLAS-SCNC: 26 MMOL/L (ref 22–29)
ERYTHROCYTE [DISTWIDTH] IN BLOOD BY AUTOMATED COUNT: 11.7 % (ref 10–15)
GFR SERPL CREATININE-BSD FRML MDRD: >90 ML/MIN/1.73M2
GLUCOSE SERPL-MCNC: 103 MG/DL (ref 70–99)
HCT VFR BLD AUTO: 41.1 % (ref 35–47)
HGB BLD-MCNC: 14.1 G/DL (ref 11.7–15.7)
MCH RBC QN AUTO: 29.5 PG (ref 26.5–33)
MCHC RBC AUTO-ENTMCNC: 34.3 G/DL (ref 31.5–36.5)
MCV RBC AUTO: 86 FL (ref 78–100)
PLATELET # BLD AUTO: 168 10E3/UL (ref 150–450)
POTASSIUM SERPL-SCNC: 4 MMOL/L (ref 3.4–5.3)
PROT SERPL-MCNC: 7.3 G/DL (ref 6.4–8.3)
RBC # BLD AUTO: 4.78 10E6/UL (ref 3.8–5.2)
SODIUM SERPL-SCNC: 141 MMOL/L (ref 136–145)
WBC # BLD AUTO: 4.7 10E3/UL (ref 4–11)

## 2022-11-18 PROCEDURE — 85027 COMPLETE CBC AUTOMATED: CPT | Performed by: FAMILY MEDICINE

## 2022-11-18 PROCEDURE — 84443 ASSAY THYROID STIM HORMONE: CPT | Performed by: FAMILY MEDICINE

## 2022-11-18 PROCEDURE — 99395 PREV VISIT EST AGE 18-39: CPT | Mod: 25 | Performed by: FAMILY MEDICINE

## 2022-11-18 PROCEDURE — 80061 LIPID PANEL: CPT | Performed by: FAMILY MEDICINE

## 2022-11-18 PROCEDURE — 99213 OFFICE O/P EST LOW 20 MIN: CPT | Mod: 25 | Performed by: FAMILY MEDICINE

## 2022-11-18 PROCEDURE — 36415 COLL VENOUS BLD VENIPUNCTURE: CPT | Performed by: FAMILY MEDICINE

## 2022-11-18 PROCEDURE — 86803 HEPATITIS C AB TEST: CPT | Performed by: FAMILY MEDICINE

## 2022-11-18 PROCEDURE — 80053 COMPREHEN METABOLIC PANEL: CPT | Performed by: FAMILY MEDICINE

## 2022-11-18 RX ORDER — CLINDAMYCIN HCL 300 MG
300 CAPSULE ORAL 3 TIMES DAILY
Qty: 15 CAPSULE | Refills: 0 | Status: SHIPPED | OUTPATIENT
Start: 2022-11-18 | End: 2023-12-01

## 2022-11-18 ASSESSMENT — ENCOUNTER SYMPTOMS
FEVER: 0
DIARRHEA: 0
FREQUENCY: 0
HEMATURIA: 0
EYE PAIN: 0
ABDOMINAL PAIN: 0
CONSTIPATION: 0
SORE THROAT: 0
HEMATOCHEZIA: 0
CHILLS: 0
BREAST MASS: 0
DIZZINESS: 0
SHORTNESS OF BREATH: 0
HEADACHES: 1
JOINT SWELLING: 0
ARTHRALGIAS: 0
COUGH: 0
NERVOUS/ANXIOUS: 0
NAUSEA: 0
PALPITATIONS: 0
PARESTHESIAS: 0
MYALGIAS: 0
WEAKNESS: 0
HEARTBURN: 0
DYSURIA: 0

## 2022-11-18 NOTE — PATIENT INSTRUCTIONS
[facility]  [address] [place] [id number]  Office: [phone]   Fax:    [phone]       Before your next covid-19 or other vaccines:     For possible COVID-19 novel coronavirus vaccine or other vaccine mild reactions not related to allergies:     Go into your shots very well hydrated and stay well hydrated for 3-4 days afterwards.     I would not take the Ibuprofen prior to your shot, but rather afterward.  No prescription steroids within 2 weeks of the shots as they can suppress your immune system, but Ibuprofen is not a significant immunosuppressant at the 400mg dosage.   Ok to take tylenol 2-325 or 2-500mg tabs prior to vaccine.  Ok to also take claritin (a non-sedating anti-histamine) 10 mg daily for 2 days and 2 otc Ibuprofen every 4-6 hours while awake for the next 36 hours and I had no problems.  There has been some controversy about whether to take Ibuprofen 400mg by mouth every 4-5 hours or naproxen 220mg by mouth twice daily  ,but nothing concrete from the CDC or WHO to recommend against taking those medications.  If you do take them, take them with food so they don't irritate your stomach.  You can do the above regimen for 2-3 after your first,  second, or third  shots to decrease the possibility of achiness, fever, chills after your COVID-19 novel coronavirus vaccines.      Hope that helps!     Sincerely,  Jia Ramos MD          Preventive Health Recommendations  Female Ages 26 - 39  Yearly exam:   See your health care provider every year in order to  Review health changes.   Discuss preventive care.    Review your medicines if you your doctor has prescribed any.    Until age 30: Get a Pap test every three years (more often if you have had an abnormal result).    After age 30: Talk to your doctor about whether you should have a Pap test every 3 years or have a Pap test with HPV screening every 5 years.   You do not need a Pap test if your uterus was  "removed (hysterectomy) and you have not had cancer.  You should be tested each year for STDs (sexually transmitted diseases), if you're at risk.   Talk to your provider about how often to have your cholesterol checked.  If you are at risk for diabetes, you should have a diabetes test (fasting glucose).  Shots: Get a flu shot each year. Get a tetanus shot every 10 years.   Nutrition:   Eat at least 5 servings of fruits and vegetables each day.  Eat whole-grain bread, whole-wheat pasta and brown rice instead of white grains and rice.  Get adequate Calcium and Vitamin D.     Lifestyle  Exercise at least 150 minutes a week (30 minutes a day, 5 days of the week). This will help you control your weight and prevent disease.  Limit alcohol to one drink per day.  No smoking.   Wear sunscreen to prevent skin cancer.  See your dentist every six months for an exam and cleaning.  Thank you so much or choosing [facility]  for your Health Care. It was a pleasure seeing you at your visit today! Please contact us with any questions or concerns you may have.                   Jia Ramos MD                              To reach your [facility] care team after hours call:   [phone] press #2 \"to speak with your care team\".  This will get you to our clinic instead of routing to central [facility]  scheduling.     PLEASE NOTE OUR HOURS HAVE CHANGED secondary to COVID-19 coronavirus pandemic, as we are trying to minimize patient exposure to the virus,  which is now widespread in the [facility].  These hours may change with very little notice.  We apologize for any inconvenience.       Our current clinic hours are:          Monday- Thursday   7:00am - 6:00pm  in person.      Friday  7:00am- 5:00pm                       Saturday and Sunday : Closed to in person and virtual visits        We have telephone and virtual visit times available between    7:00am - 6pm on " Monday-Friday as well.                                                Phone:  [phone]      Our pharmacy hours: Monday through Friday 8:00am to 5:00pm                        Saturday - 9:00 am to 12 noon       Sunday : Closed.              Phone:  [phone]              ###  Please note: at this time we are not accepting any walk-in visits. ###      There is also information available at our web site:  [email or web address]    If your provider ordered any lab tests and you do not receive the results within 10 business days, please call the clinic.    If you need a medication refill please contact your pharmacy.  Please allow 3 business days for your refill to be completed.    Our clinic offers telephone visits and e visits.  Please ask one of your team members to explain more.      Use [employer] (secure email communication and access to your chart) to send your primary care provider a message or make an appointment. Ask someone on your Team how to sign up for [employer].

## 2022-11-18 NOTE — PROGRESS NOTES
SUBJECTIVE:   CC: Jenae Villeda is an 37 year old who presents for preventive health visit and the following other medical problems:      1. Routine general medical examination at a health care facility    2. Need for hepatitis C screening test    3. Tooth abscess    4. Family history of breast cancer- Mat aunt  in her late 50's, PGM - postmenopausal x 2-per neg genetic counseling baseline mammo age 39      5. Family history of colonic polyps-baseline colonoscopy age 44 per genetic counseling -  mother in her late 50's with precancerous polyps & MGF s/p partial colectomy for multiple precancerous polyps     6. Irritable bowel syndrome with diarrhea- previously saw [facility] - ok for bentyl rx refill     7. Multiple pigmented nevi    8. CARDIOVASCULAR SCREENING; LDL GOAL LESS THAN 160        Patient has been advised of split billing requirements and indicates understanding: Yes  Healthy Habits:     Getting at least 3 servings of Calcium per day:  Yes    Bi-annual eye exam:  Yes    Dental care twice a year:  Yes    Sleep apnea or symptoms of sleep apnea:  None    Diet:  Regular (no restrictions)    Frequency of exercise:  2-3 days/week    Duration of exercise:  15-30 minutes    Taking medications regularly:  Not Applicable    Medication side effects:  Not applicable    PHQ-2 Total Score: 0    Additional concerns today:  No    Right tooth abscess - scheduled for root canal on 2022 - treated with a z-pack earlier in the month.  Headache and toothache significant. Finished     Left upper chest pain - lasts a couple of seconds usually - usually at a stressful day.  Never gets any chest pain or discomfort with that - does [employer] workouts pretty regularly.  No shortness of breath.  No cough.      Went to PT for SI joint pain - that corrected the problem.  Saw [facility] for that.      Family history of breast cancer. -saw genetic counseling.  No abnormal labs or markers.  Will do baseline mammogram starting at age 40.       Reviewed pt's genetic counseling results and summaries from 2021 in detail with pt today.  See above and below - routine baseline mammo at age 39 and colonoscopy at age 44.     Had some chills and rigors after   Today's PHQ-2 Score:   PHQ-2 ( 1999 Pfizer) 11/18/2022   Q1: Little interest or pleasure in doing things 0   Q2: Feeling down, depressed or hopeless 0   PHQ-2 Score 0   PHQ-2 Total Score (12-17 Years)- Positive if 3 or more points; Administer PHQ-A if positive -   Q1: Little interest or pleasure in doing things Not at all   Q2: Feeling down, depressed or hopeless Not at all   PHQ-2 Score 0       Have you ever done Advance Care Planning? (For example, a Health Directive, POLST, or a discussion with a medical provider or your loved ones about your wishes): No, advance care planning information given to patient to review.  Patient declined advance care planning discussion at this time.    Social History     Tobacco Use     Smoking status: Never     Smokeless tobacco: Never   Substance Use Topics     Alcohol use: Yes     Alcohol/week: 0.0 standard drinks     Comment: 2 drinks/week     If you drink alcohol do you typically have >3 drinks per day or >7 drinks per week? No    Alcohol Use 11/18/2022   Prescreen: >3 drinks/day or >7 drinks/week? No   Prescreen: >3 drinks/day or >7 drinks/week? -       Reviewed orders with patient.  Reviewed health maintenance and updated orders accordingly - Yes  Lab work is in process  Labs reviewed in EPIC  BP Readings from Last 3 Encounters:   11/18/22 120/70   05/18/22 122/72   05/19/21 115/71    Wt Readings from Last 3 Encounters:   11/18/22 63.7 kg (140 lb 6.4 oz)   05/18/22 62.1 kg (137 lb)   02/03/21 64 kg (141 lb)                  Patient Active Problem List   Diagnosis     Recurrent stomach ache     Mononucleosis     Irritable bowel syndrome with diarrhea- previously saw [facility] - ok for bentyl rx refill      CARDIOVASCULAR SCREENING; LDL GOAL LESS THAN 160     Angular  cheilitis     Disorder of ear, left     Perspiration excessive     Dizziness     S/P  section x 2     Previous  delivery, antepartum     Status post  delivery     Exposure to tanning bed, subsequent encounter- many times as a teenager and young adult      Multiple pigmented nevi     Family history of colonic polyps-baseline colonoscopy age 44 per genetic counseling -  mother in her late 50's with precancerous polyps & MGF s/p partial colectomy for multiple precancerous polyps      Family history of secondary lung cancer- secondary to smoking - MGF      Family history of breast cancer- Mat aunt  in her late 50's, PGM - postmenopausal x 2-per neg genetic counseling baseline mammo age 39       Past Surgical History:   Procedure Laterality Date      SECTION  2014    Procedure:  SECTION;  Surgeon: Izabela Saxena MD;  Location:  L[facility]D      SECTION N/A 2016    Procedure:  SECTION;  Surgeon: Whitney Marshall MD;  Location:  L+D     GYN SURGERY       in  and      MASTOIDECTOMY Left 2017    Procedure: MASTOIDECTOMY;  Surgeon: Sridhar Tran MD;  Location:  OR       Social History     Tobacco Use     Smoking status: Never     Smokeless tobacco: Never   Substance Use Topics     Alcohol use: Yes     Alcohol/week: 0.0 standard drinks     Comment: 2 drinks/week     Family History   Problem Relation Age of Onset     Neurologic Disorder Mother         trouble with her feet on neurontin, back surgery     Hyperlipidemia Mother      Colon Polyps Mother         pre- cancerous     Hyperlipidemia Father      Breast Cancer Paternal Grandmother         postmenopausal - breast ca x 2 - in remission      Cancer Paternal Grandmother      Cerebrovascular Disease Paternal Grandmother      Gastrointestinal Disease Maternal Grandfather         had portion of colon removed     Cerebrovascular Disease Maternal Grandfather      Neurologic Disorder  Maternal Grandfather         aneurysm     Cancer Maternal Grandfather         lung and pr-colon cancer     Colon Cancer Maternal Grandfather      Gastrointestinal Disease Other         celiac lupus, part of colon removed as well as gallbladder     Coronary Artery Disease Maternal Grandmother      Breast Cancer Maternal Aunt         postmenopausal - at age 61      Diabetes No family hx of          Current Outpatient Medications   Medication Sig Dispense Refill     clindamycin (CLEOCIN) 300 MG capsule Take 1 capsule (300 mg) by mouth 3 times daily 15 capsule 0     No Known Allergies  Recent Labs   Lab Test 22  1206 20  0836 18  0750 17  1441   LDL 84 75 76  --    HDL 67 74 61  --    TRIG 59 43 51  --    ALT 30  --  35 51*   CR 0.73  --  0.73 0.72   GFRESTIMATED >90  --  >90 >90  Non African American GFR Calc     GFRESTBLACK  --   --  >90 >90  African American GFR Calc     POTASSIUM 4.0  --  4.0 3.7   TSH 2.92  --  2.48  --         Breast Cancer Screening:    FHS-7:   Breast CA Risk Assessment (FHS-7) 2/3/2021   Did any of your first-degree relatives have breast or ovarian cancer? No   Did any of your relatives have bilateral breast cancer? No   Did any man in your family have breast cancer? No   Did any woman in your family have breast and ovarian cancer? No   Did any woman in your family have breast cancer before age 50 y? No   Do you have 2 or more relatives with breast and/or ovarian cancer? Yes   Do you have 2 or more relatives with breast and/or bowel cancer? No     click delete button to remove this line now  Mammogram Screening: Recommended annual mammography  Pertinent mammograms are reviewed under the imaging tab.    History of abnormal Pap smear: NO - age 30- 65 PAP every 3 years recommended  PAP / HPV Latest Ref Rng & Units 2/3/2021 2018 10/22/2012   PAP (Historical) - NIL NIL NIL   HPV16 NEG:Negative Negative Negative -   HPV18 NEG:Negative Negative Negative -   HRHPV  NEG:Negative Negative Negative -     Reviewed and updated as needed this visit by clinical staff   Tobacco  Allergies  Meds  Problems  Med Hx  Surg Hx  Fam Hx          Reviewed and updated as needed this visit by Provider   Tobacco  Allergies  Meds  Problems  Med Hx  Surg Hx  Fam Hx         Past Medical History:   Diagnosis Date     Diarrhea      GI problem      Headache      Heartburn      IBS (irritable bowel syndrome) 2004     Mononucleosis     Totally recovered     Nasal congestion      Oligomenorrhea      Sore throat       Past Surgical History:   Procedure Laterality Date      SECTION  2014    Procedure:  SECTION;  Surgeon: Izabela Saxena MD;  Location:  L+D      SECTION N/A 2016    Procedure:  SECTION;  Surgeon: Whitney Marshall MD;  Location:  L+D     GYN SURGERY       in  and      MASTOIDECTOMY Left 2017    Procedure: MASTOIDECTOMY;  Surgeon: Sridhar Tran MD;  Location: [facility]     OB History    Para Term  AB Living   2 2 2 0 0 2   SAB IAB Ectopic Multiple Live Births   0 0 0 0 2      # Outcome Date GA Lbr Yazan/2nd Weight Sex Delivery Anes PTL Lv   2 Term 16 39w4d  3.439 kg (7 lb 9.3 oz) M CS-LTranv Spinal  MELY      Name: Quinton      Apgar1: 8  Apgar5: 9   1 Term 14 40w2d 11:20 / 02:45 3.68 kg (8 lb 1.8 oz) M CS-LTranv EPI  MELY      Birth Comments: Followed by [facility] and delivered by [facility]. admit @34 wks for PTL and got steroids. forebag sROM at 40+1. induced for 16 hrs then had AROM main bag. pushed 2 hrs. tried vac x2 but no descent. epidural not working well and lots of pain. c/s done-OP and C      Name: Lam      Apgar1: 9  Apgar5: 9       Review of Systems   Constitutional: Negative for chills and fever.   HENT: Negative for congestion, ear pain, hearing loss and sore throat.    Eyes: Negative for pain and visual disturbance.   Respiratory: Negative for cough and shortness of  "breath.    Cardiovascular: Negative for chest pain, palpitations and peripheral edema.   Gastrointestinal: Negative for abdominal pain, constipation, diarrhea, heartburn, hematochezia and nausea.   Breasts:  Negative for tenderness, breast mass and discharge.   Genitourinary: Negative for dysuria, frequency, genital sores, hematuria, pelvic pain, urgency, vaginal bleeding and vaginal discharge.   Musculoskeletal: Negative for arthralgias, joint swelling and myalgias.   Skin: Negative for rash.   Neurological: Positive for headaches. Negative for dizziness, weakness and paresthesias.   Psychiatric/Behavioral: Negative for mood changes. The patient is not nervous/anxious.           OBJECTIVE:   /70   Pulse 81   Temp 97.1  F (36.2  C) (Tympanic)   Resp 16   Ht 1.6 m (5' 3\")   Wt 63.7 kg (140 lb 6.4 oz)   LMP 11/03/2022 (Approximate)   SpO2 98%   BMI 24.87 kg/m    Physical Exam  GENERAL: healthy, alert and no distress  EYES: Eyes grossly normal to inspection, PERRL and conjunctivae and sclerae normal  HENT: ear canals and TM's normal, nose and mouth without ulcers or lesions  NECK: no adenopathy, no asymmetry, masses, or scars and thyroid normal to palpation  RESP: lungs clear to auscultation - no rales, rhonchi or wheezes  BREAST: normal without masses, tenderness or nipple discharge and no palpable axillary masses or adenopathy  CV: regular rate and rhythm, normal S1 S2, no S3 or S4, no murmur, click or rub, no peripheral edema and peripheral pulses strong  ABDOMEN: soft, nontender, no hepatosplenomegaly, no masses and bowel sounds normal  MS: no gross musculoskeletal defects noted, no edema  SKIN: no suspicious lesions or rashes  NEURO: Normal strength and tone, mentation intact and speech normal  PSYCH: mentation appears normal, affect normal/bright    Diagnostic Test Results:  Labs reviewed in Epic    ASSESSMENT/PLAN:       ICD-10-CM    1. Routine general medical examination at a health care facility  " Z00.00 REVIEW OF HEALTH MAINTENANCE PROTOCOL ORDERS      2. Need for hepatitis C screening test  Z11.59 Hepatitis C Screen Reflex to HCV RNA Quant and Genotype     Hepatitis C Screen Reflex to HCV RNA Quant and Genotype      3. Tooth abscess  K04.7 clindamycin (CLEOCIN) 300 MG capsule      4. Family history of breast cancer- Mat aunt  in her late 50's, PGM - postmenopausal x 2-per neg genetic counseling baseline mammo age 39    Z80.3       5. Family history of colonic polyps-baseline colonoscopy age 44 per genetic counseling -  mother in her late 50's with precancerous polyps & MGF s/p partial colectomy for multiple precancerous polyps   Z83.71       6. Irritable bowel syndrome with diarrhea- previously saw [facility] - ok for bentyl rx refill   K58.0       7. Multiple pigmented nevi  D22.9 Adult Dermatology Referral      8. CARDIOVASCULAR SCREENING; LDL GOAL LESS THAN 160  Z13.6 CBC with platelets     Comprehensive metabolic panel     Lipid panel reflex to direct LDL Non-fasting     TSH with free T4 reflex     CBC with platelets     Comprehensive metabolic panel     Lipid panel reflex to direct LDL Non-fasting     TSH with free T4 reflex          Patient has been advised of split billing requirements and indicates understanding: Yes      COUNSELING:  Reviewed preventive health counseling, as reflected in patient instructions        She reports that she has never smoked. She has never used smokeless tobacco.        .      Jia Ramos MD  [facility]

## 2022-11-19 LAB
CHOLEST SERPL-MCNC: 163 MG/DL
HCV AB SERPL QL IA: NONREACTIVE
HDLC SERPL-MCNC: 67 MG/DL
LDLC SERPL CALC-MCNC: 84 MG/DL
NONHDLC SERPL-MCNC: 96 MG/DL
TRIGL SERPL-MCNC: 59 MG/DL
TSH SERPL DL<=0.005 MIU/L-ACNC: 2.92 UIU/ML (ref 0.3–4.2)

## 2023-02-16 ENCOUNTER — LAB REQUISITION (OUTPATIENT)
Dept: LAB | Facility: CLINIC | Age: 38
End: 2023-02-16

## 2023-02-16 LAB — SARS-COV-2 RNA RESP QL NAA+PROBE: NEGATIVE

## 2023-02-16 PROCEDURE — U0005 INFEC AGEN DETEC AMPLI PROBE: HCPCS | Performed by: INTERNAL MEDICINE

## 2023-06-15 ENCOUNTER — HOSPITAL ENCOUNTER (EMERGENCY)
Facility: CLINIC | Age: 38
Discharge: HOME | End: 2023-06-15
Attending: EMERGENCY MEDICINE | Admitting: EMERGENCY MEDICINE
Payer: OTHER MISCELLANEOUS

## 2023-06-15 VITALS
TEMPERATURE: 97.8 F | DIASTOLIC BLOOD PRESSURE: 76 MMHG | HEART RATE: 81 BPM | RESPIRATION RATE: 16 BRPM | SYSTOLIC BLOOD PRESSURE: 145 MMHG | OXYGEN SATURATION: 99 %

## 2023-06-15 DIAGNOSIS — Z77.098 ACCIDENTAL EXPOSURE TO BLEACH: ICD-10-CM

## 2023-06-15 DIAGNOSIS — Z77.098 CHEMICAL EXPOSURE OF EYE: ICD-10-CM

## 2023-06-15 DIAGNOSIS — H57.89 IRRITATION OF LEFT EYE: ICD-10-CM

## 2023-06-15 PROCEDURE — 99282 EMERGENCY DEPT VISIT SF MDM: CPT | Performed by: EMERGENCY MEDICINE

## 2023-06-15 PROCEDURE — 99284 EMERGENCY DEPT VISIT MOD MDM: CPT | Performed by: EMERGENCY MEDICINE

## 2023-06-15 RX ORDER — PROPARACAINE HYDROCHLORIDE 5 MG/ML
2 SOLUTION/ DROPS OPHTHALMIC ONCE
Status: DISCONTINUED | OUTPATIENT
Start: 2023-06-15 | End: 2023-06-15 | Stop reason: HOSPADM

## 2023-06-15 ASSESSMENT — ACTIVITIES OF DAILY LIVING (ADL): ADLS_ACUITY_SCORE: 33

## 2023-06-15 ASSESSMENT — VISUAL ACUITY: OU: 1

## 2023-06-15 NOTE — ED TRIAGE NOTES
Pt arrives ambulatory to triage w/ c/o eye pain. States she was at work when she got bleach in her eye. States irrigated w/ water pta for ~15 min. Of note, states vision is fuzzy. Endorses irritation/burning sensation. Ice pack used w/ limited relief for pain.

## 2023-06-15 NOTE — ED TRIAGE NOTES
Triage Assessment     Row Name 06/15/23 [id number]       Triage Assessment (Adult)    Airway WDL WDL       Respiratory WDL    Respiratory WDL WDL       Skin Circulation/Temperature WDL    Skin Circulation/Temperature WDL X       Cardiac WDL    Cardiac WDL WDL       Peripheral/Neurovascular WDL    Peripheral Neurovascular WDL WDL       Cognitive/Neuro/Behavioral WDL    Cognitive/Neuro/Behavioral WDL WDL

## 2023-06-15 NOTE — DISCHARGE INSTRUCTIONS
Please make an appointment to follow up with [facility] (phone: [phone]) tomorrow for further recommendations.    You can also follow-up with an with [facility] (phone: [phone]) in 1-2 days if you have any concerns.  If your symptoms worsen prior to follow-up appointment please come back to the Emergency Department.

## 2023-06-15 NOTE — ED PROVIDER NOTES
ED Provider Note  [facility]      History     Chief Complaint   Patient presents with     Eye Pain     HPI  Jenae Villeda is a 37 year old female who chemical exposure to the eyes.  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  She irrigated for approximately 10 minutes, however, she continued to have burning in the left eye after irrigation and she irrigated the eye with water for additional 5 minutes.  She was advised by her manager to come to the ED for evaluation.  She states that her vision in left eye is slightly hazy, however, she can see.  There is still some slight burning in the left eye.  She was provided with an ice pack in triage and this significantly helped her symptoms.  No shortness of breath, respiratory distress, wheezing, nausea, or any other complaints.    Past Medical History  Past Medical History:   Diagnosis Date     Diarrhea      GI problem      Headache      Heartburn      IBS (irritable bowel syndrome)      Mononucleosis     Totally recovered     Nasal congestion      Oligomenorrhea      Sore throat      Past Surgical History:   Procedure Laterality Date      SECTION  2014    Procedure:  SECTION;  Surgeon: Izabela Saxena MD;  Location:  L+D      SECTION N/A 2016    Procedure:  SECTION;  Surgeon: Whitney Marshall MD;  Location:  L+D     GYN SURGERY       in  and      MASTOIDECTOMY Left 2017    Procedure: MASTOIDECTOMY;  Surgeon: Sridhar Tran MD;  [facility]: [facility]     clindamycin (CLEOCIN) 300 MG capsule      No Known Allergies  Family History  Family History   Problem Relation Age of Onset     Neurologic Disorder Mother         trouble with her feet on neurontin, back surgery     Hyperlipidemia Mother      Colon Polyps Mother         pre- cancerous     Hyperlipidemia  Father      Breast Cancer Paternal Grandmother         postmenopausal - breast ca x 2 - in remission      Cancer Paternal Grandmother      Cerebrovascular Disease Paternal Grandmother      Gastrointestinal Disease Maternal Grandfather         had portion of colon removed     Cerebrovascular Disease Maternal Grandfather      Neurologic Disorder Maternal Grandfather         aneurysm     Cancer Maternal Grandfather         lung and pr-colon cancer     Colon Cancer Maternal Grandfather      Gastrointestinal Disease Other         celiac lupus, part of colon removed as well as gallbladder     Coronary Artery Disease Maternal Grandmother      Breast Cancer Maternal Aunt         postmenopausal - at age 61      Diabetes No family hx of      Social History   Social History     Tobacco Use     Smoking status: Never     Smokeless tobacco: Never   Vaping Use     Vaping status: Never Used   Substance Use Topics     Alcohol use: Yes     Alcohol/week: 0.0 standard drinks of alcohol     Comment: 2 drinks/week     Drug use: No      Past medical history, past surgical history, medications, allergies, family history, and social history were reviewed with the patient. No additional pertinent items.      A complete review of systems was performed with pertinent positives and negatives noted in the HPI, and all other systems negative.    Physical Exam   BP: (!) 145/76  Pulse: 81  Temp: 97.8  F (36.6  C)  Resp: 16  SpO2: 99 %  Physical Exam  Vitals and nursing note reviewed.   Constitutional:       General: She is not in acute distress.     Appearance: Normal appearance.   HENT:      Head: Normocephalic.      Nose: Nose normal.   Eyes:      General: Lids are normal. Vision grossly intact. Gaze aligned appropriately. No visual field deficit.        Right eye: No discharge.         Left eye: No discharge.      Extraocular Movements: Extraocular movements intact.      Conjunctiva/sclera:      Left eye: Left conjunctiva is injected. No  chemosis, exudate or hemorrhage.     Pupils: Pupils are equal, round, and reactive to light.      Comments: There is left-sided conjunctival injection.  pH of the left eye is 7.  There is no fluorescein dye uptake in the cornea.  No corneal ulcer.   Cardiovascular:      Rate and Rhythm: Normal rate.   Pulmonary:      Effort: Pulmonary effort is normal. No respiratory distress.   Musculoskeletal:      Cervical back: Normal range of motion.   Neurological:      General: No focal deficit present.      Mental Status: She is alert and oriented to person, place, and time.      Cranial Nerves: No cranial nerve deficit.   Psychiatric:         Mood and Affect: Mood normal.         Behavior: Behavior normal.         Thought Content: Thought content normal.         Judgment: Judgment normal.           ED Course, Procedures, & Data      Procedures                     No results found for any visits on 06/15/23.  Medications   proparacaine (ALCAINE) 0.5 % ophthalmic solution 2 drop (has no administration in time range)   fluorescein (FUL-LIZBETH) ophthalmic strip 1 strip (has no administration in time range)     Labs Ordered and Resulted from Time of ED Arrival to Time of ED Departure - No data to display  No orders to display          Critical care was not performed.     Medical Decision Making  The patient's presentation was of high complexity (an acute health issue posing potential threat to life or bodily function).    The patient's evaluation involved:  ordering and/or review of 2 test(s) in this encounter (see separate area of note for details)  discussion of management or test interpretation with another health professional (see separate area of note for details)    The patient's management necessitated high risk (a decision regarding hospitalization).      Assessment & Plan    37-year-old woman presenting to the emergency department with work related injury to left thigh.  pH was checked in the left thigh and it was normal  at 7.0.  Fluorescein dye was applied to the left eye for evaluation of possible corneal abrasion and no corneal abrasion was found on examination of the eye.  Case was discussed with poison control who had no further recommendations at this time. Patient stable for discharge with employee health follow-up. She agrees to return if her symptoms worsen.    I have reviewed the nursing notes. I have reviewed the findings, diagnosis, plan and need for follow up with the patient.    New Prescriptions    No medications on file       Final diagnoses:   Accidental exposure to bleach   Irritation of left eye   Chemical exposure of eye         [facility] EMERGENCY DEPARTMENT  6/15/2023     Ralph Acevedo MD  06/15/23 [id number]

## 2023-09-18 ENCOUNTER — APPOINTMENT (OUTPATIENT)
Dept: CT IMAGING | Facility: CLINIC | Age: 38
End: 2023-09-18
Attending: EMERGENCY MEDICINE
Payer: COMMERCIAL

## 2023-09-18 ENCOUNTER — HOSPITAL ENCOUNTER (EMERGENCY)
Facility: CLINIC | Age: 38
Discharge: HOME | End: 2023-09-18
Attending: PHYSICIAN ASSISTANT | Admitting: PHYSICIAN ASSISTANT
Payer: COMMERCIAL

## 2023-09-18 VITALS
DIASTOLIC BLOOD PRESSURE: 75 MMHG | HEART RATE: 78 BPM | WEIGHT: 131 LBS | TEMPERATURE: 98.1 F | HEIGHT: 62 IN | BODY MASS INDEX: 24.11 KG/M2 | RESPIRATION RATE: 20 BRPM | SYSTOLIC BLOOD PRESSURE: 131 MMHG | OXYGEN SATURATION: 98 %

## 2023-09-18 DIAGNOSIS — K52.9 PROCTOCOLITIS: ICD-10-CM

## 2023-09-18 LAB
ALBUMIN SERPL BCG-MCNC: 4.6 G/DL (ref 3.5–5.2)
ALP SERPL-CCNC: 55 U/L (ref 35–104)
ALT SERPL W P-5'-P-CCNC: 14 U/L (ref 0–50)
ANION GAP SERPL CALCULATED.3IONS-SCNC: 11 MMOL/L (ref 7–15)
AST SERPL W P-5'-P-CCNC: 27 U/L (ref 0–45)
BASOPHILS # BLD AUTO: 0.1 10E3/UL (ref 0–0.2)
BASOPHILS NFR BLD AUTO: 1 %
BILIRUB SERPL-MCNC: 0.4 MG/DL
BUN SERPL-MCNC: 11.2 MG/DL (ref 6–20)
CALCIUM SERPL-MCNC: 9.4 MG/DL (ref 8.6–10)
CHLORIDE SERPL-SCNC: 102 MMOL/L (ref 98–107)
CREAT SERPL-MCNC: 0.76 MG/DL (ref 0.51–0.95)
DEPRECATED HCO3 PLAS-SCNC: 27 MMOL/L (ref 22–29)
EGFRCR SERPLBLD CKD-EPI 2021: >90 ML/MIN/1.73M2
EOSINOPHIL # BLD AUTO: 0 10E3/UL (ref 0–0.7)
EOSINOPHIL NFR BLD AUTO: 0 %
ERYTHROCYTE [DISTWIDTH] IN BLOOD BY AUTOMATED COUNT: 12.1 % (ref 10–15)
GLUCOSE SERPL-MCNC: 108 MG/DL (ref 70–99)
HCG SERPL QL: NEGATIVE
HCT VFR BLD AUTO: 41.9 % (ref 35–47)
HGB BLD-MCNC: 14 G/DL (ref 11.7–15.7)
IMM GRANULOCYTES # BLD: 0 10E3/UL
IMM GRANULOCYTES NFR BLD: 1 %
LACTATE SERPL-SCNC: 1.4 MMOL/L (ref 0.7–2)
LYMPHOCYTES # BLD AUTO: 2.1 10E3/UL (ref 0.8–5.3)
LYMPHOCYTES NFR BLD AUTO: 27 %
MCH RBC QN AUTO: 29.7 PG (ref 26.5–33)
MCHC RBC AUTO-ENTMCNC: 33.4 G/DL (ref 31.5–36.5)
MCV RBC AUTO: 89 FL (ref 78–100)
MONOCYTES # BLD AUTO: 0.6 10E3/UL (ref 0–1.3)
MONOCYTES NFR BLD AUTO: 7 %
NEUTROPHILS # BLD AUTO: 5.1 10E3/UL (ref 1.6–8.3)
NEUTROPHILS NFR BLD AUTO: 64 %
NRBC # BLD AUTO: 0 10E3/UL
NRBC BLD AUTO-RTO: 0 /100
PLATELET # BLD AUTO: 176 10E3/UL (ref 150–450)
POTASSIUM SERPL-SCNC: 3.7 MMOL/L (ref 3.4–5.3)
PROT SERPL-MCNC: 7.1 G/DL (ref 6.4–8.3)
RBC # BLD AUTO: 4.72 10E6/UL (ref 3.8–5.2)
SODIUM SERPL-SCNC: 140 MMOL/L (ref 136–145)
WBC # BLD AUTO: 7.9 10E3/UL (ref 4–11)

## 2023-09-18 PROCEDURE — 80053 COMPREHEN METABOLIC PANEL: CPT | Performed by: EMERGENCY MEDICINE

## 2023-09-18 PROCEDURE — 36415 COLL VENOUS BLD VENIPUNCTURE: CPT | Performed by: EMERGENCY MEDICINE

## 2023-09-18 PROCEDURE — 85004 AUTOMATED DIFF WBC COUNT: CPT | Performed by: EMERGENCY MEDICINE

## 2023-09-18 PROCEDURE — 84703 CHORIONIC GONADOTROPIN ASSAY: CPT | Performed by: EMERGENCY MEDICINE

## 2023-09-18 PROCEDURE — 250N000013 HC RX MED GY IP 250 OP 250 PS 637: Performed by: EMERGENCY MEDICINE

## 2023-09-18 PROCEDURE — 250N000009 HC RX 250: Performed by: EMERGENCY MEDICINE

## 2023-09-18 PROCEDURE — 99285 EMERGENCY DEPT VISIT HI MDM: CPT | Mod: 25

## 2023-09-18 PROCEDURE — 83605 ASSAY OF LACTIC ACID: CPT | Performed by: EMERGENCY MEDICINE

## 2023-09-18 PROCEDURE — 250N000011 HC RX IP 250 OP 636: Performed by: EMERGENCY MEDICINE

## 2023-09-18 PROCEDURE — 74177 CT ABD & PELVIS W/CONTRAST: CPT

## 2023-09-18 RX ORDER — ONDANSETRON 4 MG/1
4 TABLET, ORALLY DISINTEGRATING ORAL EVERY 6 HOURS PRN
Qty: 10 TABLET | Refills: 0 | Status: SHIPPED | OUTPATIENT
Start: 2023-09-18 | End: 2023-09-21

## 2023-09-18 RX ORDER — IOPAMIDOL 755 MG/ML
500 INJECTION, SOLUTION INTRAVASCULAR ONCE
Status: COMPLETED | OUTPATIENT
Start: 2023-09-18 | End: 2023-09-18

## 2023-09-18 RX ORDER — OXYCODONE HYDROCHLORIDE 5 MG/1
5 TABLET ORAL ONCE
Status: COMPLETED | OUTPATIENT
Start: 2023-09-18 | End: 2023-09-18

## 2023-09-18 RX ORDER — OXYCODONE AND ACETAMINOPHEN 5; 325 MG/1; MG/1
1 TABLET ORAL EVERY 6 HOURS PRN
Qty: 10 TABLET | Refills: 0 | Status: SHIPPED | OUTPATIENT
Start: 2023-09-18 | End: 2023-09-21

## 2023-09-18 RX ADMIN — OXYCODONE HYDROCHLORIDE 5 MG: 5 TABLET ORAL at 12:35

## 2023-09-18 RX ADMIN — SODIUM CHLORIDE 56 ML: 9 INJECTION, SOLUTION INTRAVENOUS at 14:29

## 2023-09-18 RX ADMIN — IOPAMIDOL 65 ML: 755 INJECTION, SOLUTION INTRAVENOUS at 14:29

## 2023-09-18 ASSESSMENT — ACTIVITIES OF DAILY LIVING (ADL)
ADLS_ACUITY_SCORE: 35
ADLS_ACUITY_SCORE: 33

## 2023-09-18 NOTE — ED PROVIDER NOTES
"  History     Chief Complaint:  Abdominal Pain     The history is provided by the patient.      Jenae Villeda is a 38 year old female with a history of IBS who presents to the emergency department for abdominal pain. The patient states that Thursday, Friday, and  she began experiencing an onset of her IBS which she describes as feeling the urge to have a bowel movement in which she describes as as mucus and brown. She reports that she kept experiencing the urge to have a bowel movement which did not alleviate her symptoms. She states that she went and laid down on the couch for an hour and experienced 3 more episodes of this, in which she describes her stool as brown and bloody. She adds that she began experiencing rectal pain, abdominal pain, and diaphoresis. She describes her rectal pain as a constant burning sensation. She notes that her rectal pain is still present but her abdominal pain and diaphoresis have been alleviated by oxycodone in triage. Denies hx of recent travel but traveled to [place] recently. Denies of colitis. Denies camping in past month. Denies recent hospitalizations or antibiotic use. Denies hx of anal sexual intercourse. She notes she recently ate some lettuce that she reports \"looked odd.\" She reports she used to follow with a GI doctor but has since not seen them in a while.    Independent Historian:   None - Patient Only    Review of External Notes:   Reviewed Dr. Escobar [facility] medicine note from 2022 where patient was seen for physical.  Note history of IBS with diarrhea previously followed by [facility] on [facility].  No other medications.  Also family history of colonic polyps.    Medications:    Clindamycin    Past Medical History:    Diarrhea  GI problem  IBS  Mononucleosis  Oligomenorrhea  Angular cheilitis    Past Surgical History:     x 2  Mastoidectomy    Physical Exam   Patient Vitals for the past 24 hrs:   BP Temp Temp src Pulse Resp SpO2 " "Height Weight   09/18/23 1639 131/75 -- -- 78 -- 98 % -- --   09/18/23 1225 -- -- -- -- 20 -- -- --   09/18/23 1222 (!) 141/99 98.1  F (36.7  C) Temporal 73 -- 99 % 1.575 m (5' 2\") 59.4 kg (131 lb)      Physical Exam  General: Awake, alert, non-toxic.  Head:  Scalp is NC/AT  Eyes:  Conjunctiva normal, PERRL  ENT:  The external nose and ears are normal.   Neck:  Normal range of motion without rigidity.  CV:  Regular rate and rhythm    No pathologic murmur, rubs, or gallops.  Resp:  Breath sounds are clear bilaterally    Non-labored, no retractions or accessory muscle use  Abdomen: Abdomen is soft, no distension, mild BL lower abdominal tenderness, no rebound or guarding  MS:  No lower extremity edema/swelling.   Skin:  Warm and dry, No rash or lesions noted.  Neuro:  Alert and oriented.  GCS 15 Moves all extremities normal.  No facial asymmetry. Gait normal.  Psych:  Awake. Alert. Normal affect. Appropriate interactions.      Emergency Department Course     Imaging:  CT Abdomen Pelvis w Contrast   Final Result   IMPRESSION:    1.  Acute proctocolitis involving the rectum, sigmoid colon and   descending colon, likely either infectious or inflammatory.      LUCITA MIRANDA MD            SYSTEM ID:  [id number]         Report per radiology    Laboratory:  Labs Ordered and Resulted from Time of ED Arrival to Time of ED Departure   COMPREHENSIVE METABOLIC PANEL - Abnormal       Result Value    Sodium 140      Potassium 3.7      Chloride 102      Carbon Dioxide (CO2) 27      Anion Gap 11      Urea Nitrogen 11.2      Creatinine 0.76      Calcium 9.4      Glucose 108 (*)     Alkaline Phosphatase 55      AST 27      ALT 14      Protein Total 7.1      Albumin 4.6      Bilirubin Total 0.4      GFR Estimate >90     LACTIC ACID WHOLE BLOOD - Normal    Lactic Acid 1.4     HCG QUALITATIVE PREGNANCY - Normal    hCG Serum Qualitative Negative     CBC WITH PLATELETS AND DIFFERENTIAL    WBC Count 7.9      RBC Count 4.72      Hemoglobin 14.0 "      Hematocrit 41.9      MCV 89      MCH 29.7      MCHC 33.4      RDW 12.1      Platelet Count 176      % Neutrophils 64      % Lymphocytes 27      % Monocytes 7      % Eosinophils 0      % Basophils 1      % Immature Granulocytes 1      NRBCs per 100 WBC 0      Absolute Neutrophils 5.1      Absolute Lymphocytes 2.1      Absolute Monocytes 0.6      Absolute Eosinophils 0.0      Absolute Basophils 0.1      Absolute Immature Granulocytes 0.0      Absolute NRBCs 0.0        Emergency Department Course & Assessments:    Interventions:  Medications   oxyCODONE (ROXICODONE) tablet 5 mg (5 mg Oral $Given 23 1235)   iopamidol (ISOVUE-370) solution 500 mL (65 mLs Intravenous $Given 23 1429)   CT scan flush (56 mLs Intravenous $Given 23 1429)      Assessments:  1620 I obtained history and examined the patient as noted above. I discussed findings and discharge with the patient. All questions answered.     Independent Interpretation (X-rays, CTs, rhythm strip):  none    Consultations/Discussion of Management or Tests:  None     Social Determinants of Health affecting care:   None    Disposition:  The patient was discharged to home.     Impression & Plan      Medical Decision Makin-year-old female with a history of IBS diarrhea type who presents for evaluation of diarrhea mucousy stools hematochezia lower abdominal and rectal pain.  Broad differential considered.  Work-up here shows evidence of proctocolitis on contrast CT scan.  There is no evidence of abscess perforation or obstruction.  Well-appearing and vitally stable with normal hemoglobin nothing to suggest severe blood loss/GI bleed.  BUN normal no hematemesis very low suspicion for upper GI bleed.  Patient is afebrile nontoxic with normal white blood cell count no indication for empiric antibiotic treatment or admission at this time and her pain is controlled and she is tolerating p.o.  I discussed with her that the differential would include likely  infectious versus inflammatory causes such as ulcerative colitis, less likely malignancy.  Unable to provide stool sample here will discharge to home with stool samples no specific risk factor for specific infectious diarrhea.  I also stressed that she needs to follow-up closely with GI for further evaluation and likely outpatient colonoscopy.  She already saw an NG in the past she will call them tomorrow to schedule a visit.  She will return if increased bleeding, hematemesis, worsening pain, fever etc.      Diagnosis:    ICD-10-CM    1. Proctocolitis  K52.9 Enteric Bacteria and Virus Panel by TODD Stool     C. difficile Toxin B PCR with reflex to C. difficile Antigen and Toxins A/B EIA           Discharge Medications:  Discharge Medication List as of 9/18/2023  4:39 PM        START taking these medications    Details   ondansetron (ZOFRAN ODT) 4 MG ODT tab Take 1 tablet (4 mg) by mouth every 6 hours as needed for nausea or vomiting, Disp-10 tablet, R-0, Local Print      oxyCODONE-acetaminophen (PERCOCET) 5-325 MG tablet Take 1 tablet by mouth every 6 hours as needed for moderate to severe pain, Disp-10 tablet, R-0, Local Print            Scribe Disclosure:  I, Jose Quintana, am serving as a scribe at 4:29 PM on 9/18/2023 to document services personally performed by Nikita Reece PA-C based on my observations and the provider's statements to me.     9/18/2023   Nikita Reece PA-C Etten, Clark Ellsworth, PA-C  09/18/23 1906

## 2023-09-18 NOTE — ED TRIAGE NOTES
Pt has hx of IBS, had a flare Thursday and Friday, now c/o urge to have a stool but the only passes small amounts of mucus and blood. Pt c/o nausea, no emesis.    Triage Assessment       Row Name 09/18/23 1223       Respiratory WDL    Respiratory WDL WDL       Skin Circulation/Temperature WDL    Skin Circulation/Temperature WDL WDL       Cardiac WDL    Cardiac WDL WDL       Peripheral/Neurovascular WDL    Peripheral Neurovascular WDL WDL       Cognitive/Neuro/Behavioral WDL    Cognitive/Neuro/Behavioral WDL WDL

## 2023-09-18 NOTE — ED NOTES
"Tele-PIT/Intake Evaluation      Video-Visit Details    Type of service:  Video Visit    Video Start Time (time video started): 1:08 PM  Video End Time (time video stopped): 1:11 PM   Originating Location (pt. Location): [facility]  Distant Location (provider location):  [facility]  Mode of Communication:  Video Conference via [employer]  Patient verbally consented to [employer] televisit.    History:  Patient with history of IBS with stomach cramps as flares and associated diarrhea.  Yesterday patient was driving and had stool urgency.  Patient went to the bathroom after this and only had small stool.  Patient with constant abdominal pain since then.  This is different than usual IBS pain.  Patient with mucous bowel movements since that time with blood noted in it.      Exam:  General:  Alert, interactive  Cardiovascular:  Well perfused  Lungs:  No respiratory distress, no accessory muscle use  Neuro:  Moving all 4 extremities  Skin:  Warm, dry  Psych:  Normal affect    Patient Vitals for the past 24 hrs:   BP Temp Temp src Pulse Resp SpO2 Height Weight   09/18/23 1225 -- -- -- -- 20 -- -- --   09/18/23 1222 (!) 141/99 98.1  F (36.7  C) Temporal 73 -- 99 % 1.575 m (5' 2\") 59.4 kg (131 lb)       Appropriate interventions for symptom management were initiated if applicable.  Appropriate diagnostic tests were initiated if indicated.    Important information for subsequent clinician:  Constant abdominal pain with bloody mucousy stool and stool urgency feeling that is different than her previous IBS pain.  This is been ongoing since yesterday.    I briefly evaluated the patient and developed an initial plan of care. I discussed this plan and explained that this brief interaction does not constitute a full evaluation. Patient/family understands that they should wait to be fully evaluated and discuss any test results with another clinician prior to leaving the hospital.       Alexandra Miller MD  09/18/23 " 1002

## 2023-09-18 NOTE — DISCHARGE INSTRUCTIONS

## 2023-09-19 ENCOUNTER — LAB (OUTPATIENT)
Dept: LAB | Facility: CLINIC | Age: 38
End: 2023-09-19
Payer: COMMERCIAL

## 2023-09-19 DIAGNOSIS — K52.9 PROCTOCOLITIS: ICD-10-CM

## 2023-09-21 ENCOUNTER — MYC MEDICAL ADVICE (OUTPATIENT)
Dept: FAMILY MEDICINE | Facility: CLINIC | Age: 38
End: 2023-09-21
Payer: COMMERCIAL

## 2023-09-22 LAB
ADV 40+41 DNA STL QL NAA+NON-PROBE: NEGATIVE
ASTRO TYP 1-8 RNA STL QL NAA+NON-PROBE: NEGATIVE
C CAYETANENSIS DNA STL QL NAA+NON-PROBE: NEGATIVE
CAMPYLOBACTER DNA SPEC NAA+PROBE: NEGATIVE
CRYPTOSP DNA STL QL NAA+NON-PROBE: NEGATIVE
E COLI O157 DNA STL QL NAA+NON-PROBE: NORMAL
E HISTOLYT DNA STL QL NAA+NON-PROBE: NEGATIVE
EAEC ASTA GENE ISLT QL NAA+PROBE: NEGATIVE
EC STX1+STX2 GENES STL QL NAA+NON-PROBE: NEGATIVE
EPEC EAE GENE STL QL NAA+NON-PROBE: NEGATIVE
ETEC LTA+ST1A+ST1B TOX ST NAA+NON-PROBE: NEGATIVE
G LAMBLIA DNA STL QL NAA+NON-PROBE: NEGATIVE
NOROVIRUS GI+II RNA STL QL NAA+NON-PROBE: NEGATIVE
P SHIGELLOIDES DNA STL QL NAA+NON-PROBE: NEGATIVE
RVA RNA STL QL NAA+NON-PROBE: NEGATIVE
SALMONELLA SP RPOD STL QL NAA+PROBE: NEGATIVE
SAPO I+II+IV+V RNA STL QL NAA+NON-PROBE: NEGATIVE
SHIGELLA SP+EIEC IPAH ST NAA+NON-PROBE: NEGATIVE
V CHOLERAE DNA SPEC QL NAA+PROBE: NEGATIVE
VIBRIO DNA SPEC NAA+PROBE: NEGATIVE
Y ENTEROCOL DNA STL QL NAA+PROBE: NEGATIVE

## 2023-09-22 PROCEDURE — 87507 IADNA-DNA/RNA PROBE TQ 12-25: CPT | Performed by: PHYSICIAN ASSISTANT

## 2023-09-25 ENCOUNTER — TRANSFERRED RECORDS (OUTPATIENT)
Dept: HEALTH INFORMATION MANAGEMENT | Facility: CLINIC | Age: 38
End: 2023-09-25
Payer: COMMERCIAL

## 2023-10-03 ENCOUNTER — TRANSFERRED RECORDS (OUTPATIENT)
Dept: HEALTH INFORMATION MANAGEMENT | Facility: CLINIC | Age: 38
End: 2023-10-03
Payer: COMMERCIAL

## 2023-10-19 ENCOUNTER — PATIENT OUTREACH (OUTPATIENT)
Dept: CARE COORDINATION | Facility: CLINIC | Age: 38
End: 2023-10-19
Payer: COMMERCIAL

## 2023-11-02 ENCOUNTER — PATIENT OUTREACH (OUTPATIENT)
Dept: CARE COORDINATION | Facility: CLINIC | Age: 38
End: 2023-11-02
Payer: COMMERCIAL

## 2023-11-27 ENCOUNTER — VIRTUAL VISIT (OUTPATIENT)
Dept: FAMILY MEDICINE | Facility: CLINIC | Age: 38
End: 2023-11-27
Payer: COMMERCIAL

## 2023-11-27 DIAGNOSIS — J04.0 LARYNGITIS: Primary | ICD-10-CM

## 2023-11-27 DIAGNOSIS — J06.9 UPPER RESPIRATORY TRACT INFECTION, UNSPECIFIED TYPE: ICD-10-CM

## 2023-11-27 DIAGNOSIS — J20.9 ACUTE BRONCHITIS WITH SYMPTOMS > 10 DAYS: ICD-10-CM

## 2023-11-27 PROCEDURE — 99213 OFFICE O/P EST LOW 20 MIN: CPT | Mod: VID | Performed by: NURSE PRACTITIONER

## 2023-11-27 RX ORDER — AZITHROMYCIN 250 MG/1
TABLET, FILM COATED ORAL
Qty: 6 TABLET | Refills: 0 | Status: SHIPPED | OUTPATIENT
Start: 2023-11-27 | End: 2023-12-02

## 2023-11-27 NOTE — PROGRESS NOTES
Jenae is a 38 year old who is being evaluated via a billable video visit.      How would you like to obtain your AVS? MyChart  If the video visit is dropped, the invitation should be resent by: Text to cell phone: [phone]  Will anyone else be joining your video visit? No    Assessment & Plan     Laryngitis  Upper respiratory tract infection, unspecified type  Acute bronchitis with symptoms > 10 days  Persistent symptoms-will treat with antibiotics.  Encourage probiotics while on antibiotics.   If may take several days of antibiotics before feeling better.   Encourage nasal spray such as NASONEX, NASOCORT OR FLONASE, and/or Neti pot or Neilmed sinus rinses with Distilled water only.  Be seen if symptoms are worsening.  Jenae verbalizes understanding of plan of care and is in agreement.              - azithromycin (ZITHROMAX) 250 MG tablet  Dispense: 6 tablet; Refill: 0        Return in about 2 weeks (around 12/11/2023) for if symptoms persist or worsening please be seen.      [facility]   Jenae is a 38 year old, presenting for the following health issues:  Cough        11/27/2023     1:25 PM   Additional Questions   Roomed by [facility]   Accompanied by Self       History of Present Illness       Reason for visit:  Cough and cold  Symptom onset:  3-4 weeks ago  Symptoms include:  Productive cough  Symptom intensity:  Moderate  Symptom progression:  Staying the same  Had these symptoms before:  Yes  What makes it worse:  Walking in the cold, in the morning and at bedtime  What makes it better:  Taking a shower, codeine cough syrup    She eats 2-3 servings of fruits and vegetables daily.She consumes 0 sweetened beverage(s) daily.She exercises with enough effort to increase her heart rate 20 to 29 minutes per day.  She exercises with enough effort to increase her heart rate 4 days per week.   She is taking medications regularly.     X3 weeks  - Nov 6 started - Cough - currently white or clear - last night non productive.   Laryngitis lasting 4 days. Talking makes it worse   No other symptoms.   Cough syrup is old - still does help.  Has not covid swabbed. - Has no tests. Last time had Covid maybe 09/2023? Home tests neg.  and son had it in Sept.   Cough was green last week.   NO Sinus pressure-Nasal congestion    Review of Systems   Constitutional, HEENT, cardiovascular, pulmonary, GI, , musculoskeletal, neuro, skin, endocrine and psych systems are negative, except as otherwise noted in the HPI.       Objective           Vitals:  No vitals were obtained today due to virtual visit.    Physical Exam   GENERAL: Healthy, alert and no distress  EYES: Eyes grossly normal to inspection.  No discharge or erythema, or obvious scleral/conjunctival abnormalities.  RESP: No audible wheeze, cough, or visible cyanosis.  No visible retractions or increased work of breathing.    SKIN: Visible skin clear. No significant rash, abnormal pigmentation or lesions.  NEURO: Cranial nerves grossly intact.  Mentation and speech appropriate for age.  PSYCH: Mentation appears normal, affect normal/bright, judgement and insight intact, normal speech and appearance well-groomed.        Video-Visit Details    Type of service:  Video Visit     Originating Location (pt. Location): Home    Distant Location (provider location):  On-site  Platform used for Video Visit: [employer]

## 2024-02-03 ENCOUNTER — HEALTH MAINTENANCE LETTER (OUTPATIENT)
Age: 39
End: 2024-02-03

## 2024-05-16 ENCOUNTER — PATIENT OUTREACH (OUTPATIENT)
Dept: CARE COORDINATION | Facility: CLINIC | Age: 39
End: 2024-05-16
Payer: COMMERCIAL

## 2024-07-01 ENCOUNTER — PATIENT OUTREACH (OUTPATIENT)
Dept: CARE COORDINATION | Facility: CLINIC | Age: 39
End: 2024-07-01
Payer: COMMERCIAL

## 2024-07-16 ENCOUNTER — ANCILLARY PROCEDURE (OUTPATIENT)
Dept: MAMMOGRAPHY | Facility: CLINIC | Age: 39
End: 2024-07-16
Attending: FAMILY MEDICINE
Payer: COMMERCIAL

## 2024-07-16 DIAGNOSIS — Z12.31 VISIT FOR SCREENING MAMMOGRAM: ICD-10-CM

## 2024-07-16 PROCEDURE — 77063 BREAST TOMOSYNTHESIS BI: CPT | Mod: TC | Performed by: RADIOLOGY

## 2024-07-16 PROCEDURE — 77067 SCR MAMMO BI INCL CAD: CPT | Mod: TC | Performed by: RADIOLOGY

## 2024-07-19 NOTE — RESULT ENCOUNTER NOTE
Your mammogram was normal.     Thank you so much for choosing [facility].  Please contact us with any questions that you may have.   We appreciate the opportunity to serve you now and look forward to supporting your healthcare needs for a long time to come!    Most Sincerely,     Jia Ramos MD

## 2024-10-30 ENCOUNTER — OFFICE VISIT (OUTPATIENT)
Dept: FAMILY MEDICINE | Facility: CLINIC | Age: 39
End: 2024-10-30
Payer: COMMERCIAL

## 2024-10-30 VITALS
WEIGHT: 137 LBS | SYSTOLIC BLOOD PRESSURE: 138 MMHG | HEIGHT: 62 IN | DIASTOLIC BLOOD PRESSURE: 64 MMHG | HEART RATE: 79 BPM | RESPIRATION RATE: 20 BRPM | TEMPERATURE: 98.7 F | BODY MASS INDEX: 25.21 KG/M2 | OXYGEN SATURATION: 98 %

## 2024-10-30 DIAGNOSIS — Z83.719 FAMILY HISTORY OF COLONIC POLYPS: ICD-10-CM

## 2024-10-30 DIAGNOSIS — Z80.3 FAMILY HISTORY OF BREAST CANCER: ICD-10-CM

## 2024-10-30 DIAGNOSIS — Z12.11 SCREEN FOR COLON CANCER: ICD-10-CM

## 2024-10-30 DIAGNOSIS — Z00.00 ROUTINE GENERAL MEDICAL EXAMINATION AT A HEALTH CARE FACILITY: Primary | ICD-10-CM

## 2024-10-30 DIAGNOSIS — T50.B95A ADVERSE REACTION TO COVID-19 VACCINE: ICD-10-CM

## 2024-10-30 DIAGNOSIS — Z13.6 CARDIOVASCULAR SCREENING; LDL GOAL LESS THAN 160: ICD-10-CM

## 2024-10-30 DIAGNOSIS — Z12.4 CERVICAL CANCER SCREENING: ICD-10-CM

## 2024-10-30 DIAGNOSIS — K62.5 RECTAL BLEEDING: ICD-10-CM

## 2024-10-30 LAB
ERYTHROCYTE [DISTWIDTH] IN BLOOD BY AUTOMATED COUNT: 11.7 % (ref 10–15)
HCT VFR BLD AUTO: 41.9 % (ref 35–47)
HGB BLD-MCNC: 14 G/DL (ref 11.7–15.7)
MCH RBC QN AUTO: 29.7 PG (ref 26.5–33)
MCHC RBC AUTO-ENTMCNC: 33.4 G/DL (ref 31.5–36.5)
MCV RBC AUTO: 89 FL (ref 78–100)
PLATELET # BLD AUTO: 173 10E3/UL (ref 150–450)
RBC # BLD AUTO: 4.71 10E6/UL (ref 3.8–5.2)
WBC # BLD AUTO: 7 10E3/UL (ref 4–11)

## 2024-10-30 PROCEDURE — 90656 IIV3 VACC NO PRSV 0.5 ML IM: CPT | Performed by: FAMILY MEDICINE

## 2024-10-30 PROCEDURE — 84443 ASSAY THYROID STIM HORMONE: CPT | Performed by: FAMILY MEDICINE

## 2024-10-30 PROCEDURE — 85027 COMPLETE CBC AUTOMATED: CPT | Performed by: FAMILY MEDICINE

## 2024-10-30 PROCEDURE — 80053 COMPREHEN METABOLIC PANEL: CPT | Performed by: FAMILY MEDICINE

## 2024-10-30 PROCEDURE — 36415 COLL VENOUS BLD VENIPUNCTURE: CPT | Performed by: FAMILY MEDICINE

## 2024-10-30 PROCEDURE — 80061 LIPID PANEL: CPT | Performed by: FAMILY MEDICINE

## 2024-10-30 PROCEDURE — 90471 IMMUNIZATION ADMIN: CPT | Performed by: FAMILY MEDICINE

## 2024-10-30 PROCEDURE — G0145 SCR C/V CYTO,THINLAYER,RESCR: HCPCS | Performed by: FAMILY MEDICINE

## 2024-10-30 PROCEDURE — 87624 HPV HI-RISK TYP POOLED RSLT: CPT | Performed by: FAMILY MEDICINE

## 2024-10-30 PROCEDURE — 99396 PREV VISIT EST AGE 40-64: CPT | Mod: 25 | Performed by: FAMILY MEDICINE

## 2024-10-30 SDOH — HEALTH STABILITY: PHYSICAL HEALTH: ON AVERAGE, HOW MANY DAYS PER WEEK DO YOU ENGAGE IN MODERATE TO STRENUOUS EXERCISE (LIKE A BRISK WALK)?: 5 DAYS

## 2024-10-30 SDOH — HEALTH STABILITY: PHYSICAL HEALTH: ON AVERAGE, HOW MANY MINUTES DO YOU ENGAGE IN EXERCISE AT THIS LEVEL?: 30 MIN

## 2024-10-30 ASSESSMENT — SOCIAL DETERMINANTS OF HEALTH (SDOH): HOW OFTEN DO YOU GET TOGETHER WITH FRIENDS OR RELATIVES?: ONCE A WEEK

## 2024-10-30 NOTE — PATIENT INSTRUCTIONS
Patient Education   Preventive Care Advice   This is general advice given by our system to help you stay healthy. However, your care team may have specific advice just for you. Please talk to your care team about your preventive care needs.  Nutrition  Eat 5 or more servings of fruits and vegetables each day.  Try wheat bread, brown rice and whole grain pasta (instead of white bread, rice, and pasta).  Get enough calcium and vitamin D. Check the label on foods and aim for 100% of the RDA (recommended daily allowance).  Lifestyle  Exercise at least 150 minutes each week  (30 minutes a day, 5 days a week).  Do muscle strengthening activities 2 days a week. These help control your weight and prevent disease.  No smoking.  Wear sunscreen to prevent skin cancer.  Have a dental exam and cleaning every 6 months.  Yearly exams  See your health care team every year to talk about:  Any changes in your health.  Any medicines your care team has prescribed.  Preventive care, family planning, and ways to prevent chronic diseases.  Shots (vaccines)   HPV shots (up to age 26), if you've never had them before.  Hepatitis B shots (up to age 59), if you've never had them before.  COVID-19 shot: Get this shot when it's due.  Flu shot: Get a flu shot every year.  Tetanus shot: Get a tetanus shot every 10 years.  Pneumococcal, hepatitis A, and RSV shots: Ask your care team if you need these based on your risk.  Shingles shot (for age 50 and up)  General health tests  Diabetes screening:  Starting at age 35, Get screened for diabetes at least every 3 years.  If you are younger than age 35, ask your care team if you should be screened for diabetes.  Cholesterol test: At age 39, start having a cholesterol test every 5 years, or more often if advised.  Bone density scan (DEXA): At age 50, ask your care team if you should have this scan for osteoporosis (brittle bones).  Hepatitis C: Get tested at least once in your life.  STIs (sexually  transmitted infections)  Before age 24: Ask your care team if you should be screened for STIs.  After age 24: Get screened for STIs if you're at risk. You are at risk for STIs (including HIV) if:  You are sexually active with more than one person.  You don't use condoms every time.  You or a partner was diagnosed with a sexually transmitted infection.  If you are at risk for HIV, ask about PrEP medicine to prevent HIV.  Get tested for HIV at least once in your life, whether you are at risk for HIV or not.  Cancer screening tests  Cervical cancer screening: If you have a cervix, begin getting regular cervical cancer screening tests starting at age 21.  Breast cancer scan (mammogram): If you've ever had breasts, begin having regular mammograms starting at age 40. This is a scan to check for breast cancer.  Colon cancer screening: It is important to start screening for colon cancer at age 45.  Have a colonoscopy test every 10 years (or more often if you're at risk) Or, ask your provider about stool tests like a FIT test every year or Cologuard test every 3 years.  To learn more about your testing options, visit:   .  For help making a decision, visit:   https://bit.ly/bj21228.  Prostate cancer screening test: If you have a prostate, ask your care team if a prostate cancer screening test (PSA) at age 55 is right for you.  Lung cancer screening: If you are a current or former smoker ages 50 to 80, ask your care team if ongoing lung cancer screenings are right for you.  For informational purposes only. Not to replace the advice of your health care provider. Copyright   2023 [employer]. All rights reserved. Clinically reviewed by the [facility] Transitions Program. ViS 225086 - REV 01/24.

## 2024-10-30 NOTE — PROGRESS NOTES
Preventive Care Visit  [facility] [place]  Jia Ramos MD, Family Medicine  Oct 30, 2024      Assessment & Plan       ICD-10-CM    1. Routine general medical examination at a health care facility  Z00.00       2. Cervical cancer screening  Z12.4 HPV and Gynecologic Cytology Panel - Recommended Age 30 - 65 Years     Gynecologic Cytology (PAP)      3. Adverse reaction to COVID-19 vaccine- chills and rigors within 15 minutes of receiving her 3rd vaccine- no further covid-19 vaccines please.  T50.B95A       4. CARDIOVASCULAR SCREENING; LDL GOAL LESS THAN 160  Z13.6 TSH with free T4 reflex     Lipid panel reflex to direct LDL Non-fasting     Comprehensive metabolic panel     CBC with platelets     TSH with free T4 reflex     Lipid panel reflex to direct LDL Non-fasting     Comprehensive metabolic panel     CBC with platelets      5. Rectal bleeding -current jelly stools -CT from ER showed proctocolitis -mild thickening - had colonoscopy 10/3/2023 - internal hemorrhoids, o/w negative - repeat in   K62.5       6. Screen for colon cancer- baseline colonoscopy done 10/2023 for currant jelly stools = no polyps - repeat in  per [facility]  Z12.11       7. Family history of colonic polyps-baseline colonoscopy age 39 per genetic counseling -  mother in her late 50's with precancerous polyps & MGF s/p partial colectomy for multiple precancerous polyps  Z83.719       8. Family history of breast cancer- Mat aunt  in her late 50's, PGM - postmenopausal x 2-per neg genetic counseling baseline mammo age 39 = neg 2024 -repeat yearly  Z80.3          Is NOT fasting this afternoon - will get nonfasting lipid panel for screening.     Return in about 1 year (around 10/30/2025) for Wellness/Preventative Visit, w/ Dr. VERA for 40 min appt.    Please, call our clinic or go to the ER immediately if signs or symptoms worsen or fail to improve as anticipated.       Patient has been advised of split billing  requirements and indicates understanding: Yes    Counseling  Appropriate preventive services were addressed with this patient via screening, questionnaire, or discussion as appropriate for fall prevention, nutrition, physical activity, Tobacco-use cessation, social engagement, weight loss and cognition.  Checklist reviewing preventive services available has been given to the patient.  Reviewed patient's diet, addressing concerns and/or questions.       MEDICATIONS:   No orders of the defined types were placed in this encounter.        - Continue other medications without change  Regular exercise  Body mass index is 24.67 kg/m .   See Patient Instructions       Jia Ramos MD      Ramonita Emanuel is a 39 year old, presenting for the following:  Physical  and the following other medical problems:      1. Routine general medical examination at a health care facility    2. Cervical cancer screening    3. Adverse reaction to COVID-19 vaccine- chills and rigors within 15 minutes of receiving her 3rd vaccine- no further covid-19 vaccines please.    4. CARDIOVASCULAR SCREENING; LDL GOAL LESS THAN 160    5. Rectal bleeding -current jelly stools -CT from ER showed proctocolitis -mild thickening - had colonoscopy 10/3/2023 - internal hemorrhoids, o/w negative - repeat in     6. Screen for colon cancer- baseline colonoscopy done 10/2023 for currant jelly stools = no polyps - repeat in  per [facility]    7. Family history of colonic polyps-baseline colonoscopy age 39 per genetic counseling -  mother in her late 50's with precancerous polyps & MGF s/p partial colectomy for multiple precancerous polyps    8. Family history of breast cancer- Mat aunt  in her late 50's, PGM - postmenopausal x 2-per neg genetic counseling baseline mammo age 39 = neg 2024 -repeat yearly            10/30/2024     3:42 PM   Additional Questions   Roomed by kajal SCHERER   Accompanied by [facility]  Directive  Patient does not have a Health Care Directive: Discussed advance care planning with patient; however, patient declined at this time.      10/30/2024   General Health   How would you rate your overall physical health? Good   Feel stress (tense, anxious, or unable to sleep) Not at all            10/30/2024   Nutrition   Three or more servings of calcium each day? Yes   Diet: Regular (no restrictions)   How many servings of fruit and vegetables per day? (!) 2-3   How many sweetened beverages each day? 0-1            10/30/2024   Exercise   Days per week of moderate/strenous exercise 5 days   Average minutes spent exercising at this level 30 min            10/30/2024   Social Factors   Frequency of gathering with friends or relatives Once a week   Worry food won't last until get money to buy more No   Food not last or not have enough money for food? No   Do you have housing? (Housing is defined as stable permanent housing and does not include staying ouside in a car, in a tent, in an abandoned building, in an overnight shelter, or couch-surfing.) Yes   Are you worried about losing your housing? No   Lack of transportation? No   Unable to get utilities (heat,electricity)? No            10/30/2024   Dental   Dentist two times every year? Yes               Today's PHQ-2 Score:       10/30/2024     3:37 PM   PHQ-2 ( 1999 [employer])   Q1: Little interest or pleasure in doing things 0    Q2: Feeling down, depressed or hopeless 0    PHQ-2 Score 0    Q1: Little interest or pleasure in doing things Not at all   Q2: Feeling down, depressed or hopeless Not at all   PHQ-2 Score 0       Patient-reported           10/30/2024   Substance Use   Alcohol more than 3/day or more than 7/wk No   Do you use any other substances recreationally? No        Social History     Tobacco Use    Smoking status: Never    Smokeless tobacco: Never   Vaping Use    Vaping status: Never Used   Substance Use Topics    Alcohol use: Yes      Alcohol/week: 0.0 standard drinks of alcohol     Comment: 2 drinks/week    Drug use: No           2024   LAST FHS-7 RESULTS   1st degree relative breast or ovarian cancer Yes   Any relative bilateral breast cancer No   Any male have breast cancer No   Any ONE woman have BOTH breast AND ovarian cancer No   Any woman with breast cancer before 50yrs No   2 or more relatives with breast AND/OR ovarian cancer No   2 or more relatives with breast AND/OR bowel cancer No           Mammogram Screening - Mammogram every 1-2 years updated in Health Maintenance based on mutual decision making        10/30/2024   STI Screening   New sexual partner(s) since last STI/HIV test? No        History of abnormal Pap smear: No - age 30- 64 PAP with HPV every 5 years recommended        Latest Ref Rng & Units 2/3/2021     6:21 PM 2/3/2021     6:20 PM 2018     3:40 PM   PAP / HPV   PAP (Historical)  NIL      HPV 16 DNA NEG^Negative  Negative  Negative    HPV 18 DNA NEG^Negative  Negative  Negative    Other HR HPV NEG^Negative  Negative  Negative      ASCVD Risk   The 10-year ASCVD risk score (Carine SIMMS, et al., 2019) is: 0.3%    Values used to calculate the score:      Age: 39 years      Sex: Female      Is Non- : No      Diabetic: No      Tobacco smoker: No      Systolic Blood Pressure: 138 mmHg      Is BP treated: No      HDL Cholesterol: 67 mg/dL      Total Cholesterol: 163 mg/dL        10/30/2024   Contraception/Family Planning   Questions about contraception or family planning No           Reviewed and updated as needed this visit by Provider                    Past Medical History:   Diagnosis Date    Diarrhea     GI problem     Headache     Heartburn     IBS (irritable bowel syndrome) 2004    Mononucleosis     Totally recovered    Nasal congestion     Oligomenorrhea     Sore throat      Past Surgical History:   Procedure Laterality Date     SECTION  2014    Procedure:   SECTION;  Surgeon: Izabela Saxena MD;  Location:  [facility]     SECTION N/A 2016    Procedure:  SECTION;  Surgeon: Whitney Marshall MD;  Location:  [facility]    GYN SURGERY       in  and     MASTOIDECTOMY Left 2017    Procedure: MASTOIDECTOMY;  Surgeon: Sridhar Tran MD;  Location: [facility]     OB History    Para Term  AB Living   2 2 2 0 0 2   SAB IAB Ectopic Multiple Live Births   0 0 0 0 2      # Outcome Date GA Lbr Yazan/2nd Weight Sex Type Anes PTL Lv   2 Term 16 39w4d  3.439 kg (7 lb 9.3 oz) M CS-LTranv Spinal  MELY      Name: Quinton      Apgar1: 8  Apgar5: 9   1 Term 14 40w2d 11:20 / 02:45 3.68 kg (8 lb 1.8 oz) M CS-LTranv EPI  MELY      Birth Comments: Followed by [facility] and delivered by [facility]. admit @34 wks for PTL and got steroids. forebag sROM at 40+1. induced for 16 hrs then had AROM main bag. pushed 2 hrs. tried vac x2 but no descent. epidural not working well and lots of pain. c/s done-OP and C      Name: Lam      Apgar1: 9  Apgar5: 9     Lab work is in process  Labs reviewed in EPIC  BP Readings from Last 3 Encounters:   10/30/24 138/64   23 131/75   06/15/23 (!) 145/76    Wt Readings from Last 3 Encounters:   10/30/24 62.1 kg (137 lb)   23 59.4 kg (131 lb)   22 63.7 kg (140 lb 6.4 oz)                  Patient Active Problem List   Diagnosis    Recurrent stomach ache    Mononucleosis    Irritable bowel syndrome with diarrhea- previously saw [facility] - ok for bentyl rx refill     CARDIOVASCULAR SCREENING; LDL GOAL LESS THAN 160    Angular cheilitis    Disorder of ear, left    Perspiration excessive    Dizziness    S/P  section x 2    Previous  delivery, antepartum    Status post  delivery    Exposure to tanning bed, subsequent encounter- many times as a teenager and young adult     Multiple pigmented nevi    Family history of colonic polyps-baseline colonoscopy age 39 per genetic  counseling -  mother in her late 50's with precancerous polyps & MGF s/p partial colectomy for multiple precancerous polyps    Family history of secondary lung cancer- secondary to smoking - MGF     Family history of breast cancer- Mat aunt  in her late 50's, PGM - postmenopausal x 2-per neg genetic counseling baseline mammo age 39      Adverse reaction to COVID-19 vaccine- chills and rigors within 15 minutes of receiving her 3rd vaccine- no further covid-19 vaccines please.    Rectal bleeding -current jelly stools -CT from ER showed proctocolitis -mild thickening - had colonoscopy 10/3/2023 - internal hemorrhoids, o/w negative - repeat in     Screen for colon cancer- baseline colonoscopy done 10/2023 for currant jelly stools = no polyps - repeat in  per [facility]     Past Surgical History:   Procedure Laterality Date     SECTION  2014    Procedure:  SECTION;  Surgeon: Izabela Saxena MD;  Location:  [facility]     SECTION N/A 2016    Procedure:  SECTION;  Surgeon: Whitney Marshall MD;  Location:  [facility]    GYN SURGERY       in  and     MASTOIDECTOMY Left 2017    Procedure: MASTOIDECTOMY;  Surgeon: Sridhar Tran MD;  Location:  OR       Social History     Tobacco Use    Smoking status: Never    Smokeless tobacco: Never   Substance Use Topics    Alcohol use: Yes     Alcohol/week: 0.0 standard drinks of alcohol     Comment: 2 drinks/week     Family History   Problem Relation Age of Onset    Neurologic Disorder Mother         trouble with her feet on neurontin, back surgery    Hyperlipidemia Mother     Colon Polyps Mother         pre- cancerous    Hyperlipidemia Father     Breast Cancer Paternal Grandmother         postmenopausal - breast ca x 2 - in remission     Cancer Paternal Grandmother     Cerebrovascular Disease Paternal Grandmother     Gastrointestinal Disease Maternal Grandfather         had portion of colon removed    Cerebrovascular  "Disease Maternal Grandfather     Neurologic Disorder Maternal Grandfather         aneurysm    Cancer Maternal Grandfather         lung and pr-colon cancer    Colon Cancer Maternal Grandfather     Gastrointestinal Disease Other         celiac lupus, part of colon removed as well as gallbladder    Coronary Artery Disease Maternal Grandmother     Breast Cancer Maternal Aunt         postmenopausal - at age 61     Diabetes No family hx of          No current outpatient medications on file.     No Known Allergies  Recent Labs   Lab Test 10/30/24  1643 23  1240 22  1206 22  1206 20  0836 18  0750 17  1441 17  1441   LDL 98  --   --  84 75 76   < >  --    HDL 76  --   --  67 74 61   < >  --    TRIG 75  --   --  59 43 51   < >  --    ALT 25 14  --  30  --  35  --  51*   CR 0.72 0.76   < > 0.73  --  0.73  --  0.72   GFRESTIMATED >90 >90   < > >90  --  >90  --  >90  Non  GFR Calc     GFRESTBLACK  --   --   --   --   --  >90  --  >90  African American GFR Calc     POTASSIUM 4.0 3.7   < > 4.0  --  4.0  --  3.7   TSH 2.66  --   --  2.92  --  2.48  --   --     < > = values in this interval not displayed.          Review of Systems  Constitutional, HEENT, cardiovascular, pulmonary, GI, , musculoskeletal, neuro, skin, endocrine and psych systems are negative, except as otherwise noted.     Objective    Exam  /64   Pulse 79   Temp 98.7  F (37.1  C) (Tympanic)   Resp 20   Ht 1.587 m (5' 2.48\")   Wt 62.1 kg (137 lb)   LMP 10/14/2024 (Exact Date)   SpO2 98%   BMI 24.67 kg/m     Estimated body mass index is 24.67 kg/m  as calculated from the following:    Height as of this encounter: 1.587 m (5' 2.48\").    Weight as of this encounter: 62.1 kg (137 lb).    Physical Exam  GENERAL: alert and no distress  EYES: Eyes grossly normal to inspection, PERRL and conjunctivae and sclerae normal  HENT: ear canals and TM's normal, nose and mouth without ulcers or " lesions  NECK: no adenopathy, no asymmetry, masses, or scars  RESP: lungs clear to auscultation - no rales, rhonchi or wheezes  BREAST: normal without masses, tenderness or nipple discharge and no palpable axillary masses or adenopathy  CV: regular rate and rhythm, normal S1 S2, no S3 or S4, no murmur, click or rub, no peripheral edema  ABDOMEN: soft, nontender, no hepatosplenomegaly, no masses and bowel sounds normal   (female) w/bimanual: normal female external genitalia, normal urethral meatus, normal vaginal mucosa, and normal cervix/adnexa/uterus without masses or discharge  MS: no gross musculoskeletal defects noted, no edema  SKIN: no suspicious lesions or rashes  NEURO: Normal strength and tone, mentation intact and speech normal  PSYCH: mentation appears normal, affect normal/bright        Signed Electronically by: Jia Ramos MD

## 2024-10-31 LAB
ALBUMIN SERPL BCG-MCNC: 4.8 G/DL (ref 3.5–5.2)
ALP SERPL-CCNC: 58 U/L (ref 40–150)
ALT SERPL W P-5'-P-CCNC: 25 U/L (ref 0–50)
ANION GAP SERPL CALCULATED.3IONS-SCNC: 10 MMOL/L (ref 7–15)
AST SERPL W P-5'-P-CCNC: 29 U/L (ref 0–45)
BILIRUB SERPL-MCNC: 0.3 MG/DL
BUN SERPL-MCNC: 16.9 MG/DL (ref 6–20)
CALCIUM SERPL-MCNC: 9.7 MG/DL (ref 8.8–10.4)
CHLORIDE SERPL-SCNC: 100 MMOL/L (ref 98–107)
CHOLEST SERPL-MCNC: 189 MG/DL
CREAT SERPL-MCNC: 0.72 MG/DL (ref 0.51–0.95)
EGFRCR SERPLBLD CKD-EPI 2021: >90 ML/MIN/1.73M2
FASTING STATUS PATIENT QL REPORTED: NO
FASTING STATUS PATIENT QL REPORTED: NO
GLUCOSE SERPL-MCNC: 87 MG/DL (ref 70–99)
HCO3 SERPL-SCNC: 28 MMOL/L (ref 22–29)
HDLC SERPL-MCNC: 76 MG/DL
LDLC SERPL CALC-MCNC: 98 MG/DL
NONHDLC SERPL-MCNC: 113 MG/DL
POTASSIUM SERPL-SCNC: 4 MMOL/L (ref 3.4–5.3)
PROT SERPL-MCNC: 7.6 G/DL (ref 6.4–8.3)
SODIUM SERPL-SCNC: 138 MMOL/L (ref 135–145)
TRIGL SERPL-MCNC: 75 MG/DL
TSH SERPL DL<=0.005 MIU/L-ACNC: 2.66 UIU/ML (ref 0.3–4.2)

## 2024-11-04 LAB
HPV HR 12 DNA CVX QL NAA+PROBE: NEGATIVE
HPV16 DNA CVX QL NAA+PROBE: NEGATIVE
HPV18 DNA CVX QL NAA+PROBE: NEGATIVE
HUMAN PAPILLOMA VIRUS FINAL DIAGNOSIS: NORMAL

## 2024-11-05 LAB
BKR AP ASSOCIATED HPV REPORT: NORMAL
BKR LAB AP GYN ADEQUACY: NORMAL
BKR LAB AP GYN INTERPRETATION: NORMAL
BKR LAB AP LMP: NORMAL
BKR LAB AP PREVIOUS ABNORMAL: NORMAL
PATH REPORT.COMMENTS IMP SPEC: NORMAL
PATH REPORT.COMMENTS IMP SPEC: NORMAL
PATH REPORT.RELEVANT HX SPEC: NORMAL

## 2024-11-11 NOTE — RESULT ENCOUNTER NOTE
Dear Jerel,     Thank you for your patience in getting back to you re: your results.      All your recent labs that I ordered are normal, improved, or pretty stable from previous.     Please, continue your current medications and/or supplements and follow up as we discussed at your last visit.     For additional lab test information, [email or web address] is an excellent reference.    Thank you so much for choosing [facility].  Please contact us with any questions that you may have.   We appreciate the opportunity to serve you now and look forward to supporting your healthcare needs for a long time to come!    Most Sincerely,     Jia Ramos MD

## 2025-06-19 ENCOUNTER — TELEPHONE (OUTPATIENT)
Dept: OTOLARYNGOLOGY | Facility: CLINIC | Age: 40
End: 2025-06-19
Payer: COMMERCIAL

## 2025-06-19 NOTE — TELEPHONE ENCOUNTER
Patient confirmed scheduled appointment:  Date: 8/12  Time: 1:50pm  Visit type: New ENT  Provider: Mary Beth Duran  Location: [facility]  Testing/imaging: Audio prior @ 1pm  Additional notes: No

## 2025-07-29 ENCOUNTER — HOSPITAL ENCOUNTER (OUTPATIENT)
Dept: MAMMOGRAPHY | Facility: CLINIC | Age: 40
Discharge: HOME | End: 2025-07-29
Attending: FAMILY MEDICINE
Payer: COMMERCIAL

## 2025-07-29 DIAGNOSIS — Z12.31 VISIT FOR SCREENING MAMMOGRAM: ICD-10-CM

## 2025-07-29 PROCEDURE — 77067 SCR MAMMO BI INCL CAD: CPT

## 2025-07-30 DIAGNOSIS — Z01.10 ENCOUNTER FOR HEARING EXAMINATION: Primary | ICD-10-CM

## 2025-08-12 ENCOUNTER — OFFICE VISIT (OUTPATIENT)
Dept: OTOLARYNGOLOGY | Facility: CLINIC | Age: 40
End: 2025-08-12
Attending: REGISTERED NURSE
Payer: COMMERCIAL

## 2025-08-12 ENCOUNTER — OFFICE VISIT (OUTPATIENT)
Dept: AUDIOLOGY | Facility: CLINIC | Age: 40
End: 2025-08-12
Attending: REGISTERED NURSE
Payer: COMMERCIAL

## 2025-08-12 VITALS
HEIGHT: 62 IN | WEIGHT: 142.6 LBS | DIASTOLIC BLOOD PRESSURE: 76 MMHG | SYSTOLIC BLOOD PRESSURE: 122 MMHG | HEART RATE: 65 BPM | BODY MASS INDEX: 26.24 KG/M2 | OXYGEN SATURATION: 97 %

## 2025-08-12 DIAGNOSIS — R42 DIZZINESS: ICD-10-CM

## 2025-08-12 DIAGNOSIS — R42 DIZZINESS: Primary | ICD-10-CM

## 2025-08-12 DIAGNOSIS — Z90.89 H/O MASTOIDECTOMY: Primary | ICD-10-CM

## 2025-08-12 DIAGNOSIS — Z01.10 ENCOUNTER FOR HEARING EXAMINATION: ICD-10-CM

## 2025-08-12 RX ORDER — AMOXICILLIN 500 MG
1200 CAPSULE ORAL DAILY
COMMUNITY

## 2025-08-12 ASSESSMENT — PAIN SCALES - GENERAL: PAINLEVEL_OUTOF10: NO PAIN (0)
